# Patient Record
Sex: FEMALE | Race: OTHER | HISPANIC OR LATINO | Employment: UNEMPLOYED | URBAN - METROPOLITAN AREA
[De-identification: names, ages, dates, MRNs, and addresses within clinical notes are randomized per-mention and may not be internally consistent; named-entity substitution may affect disease eponyms.]

---

## 2019-02-04 ENCOUNTER — HOSPITAL ENCOUNTER (EMERGENCY)
Facility: HOSPITAL | Age: 19
Discharge: HOME | End: 2019-02-04
Attending: EMERGENCY MEDICINE
Payer: COMMERCIAL

## 2019-02-04 VITALS
RESPIRATION RATE: 17 BRPM | TEMPERATURE: 98.2 F | HEART RATE: 84 BPM | SYSTOLIC BLOOD PRESSURE: 127 MMHG | DIASTOLIC BLOOD PRESSURE: 74 MMHG | WEIGHT: 224 LBS

## 2019-02-04 DIAGNOSIS — R04.0 BLEEDING NOSE: Primary | ICD-10-CM

## 2019-02-04 PROCEDURE — 99283 EMERGENCY DEPT VISIT LOW MDM: CPT

## 2019-02-04 NOTE — DISCHARGE INSTRUCTIONS
Epistaxis   LO QUE USTED DEBE SABER:   La epistaxis es naif hemorragia o sangrado nasal  La hemorragia nasal ocurre cuando los vasos sanguíneos cerca de la superficie de la cavidad nasal sufren naif lesión o un daño  DESPUÉS DE SER DADO DE NATALIA:   Medicamentos:   · Atomizador nasal:  Un atomizador vasoconstrictor nasal es un medicamento que ayuda a que los vasos sanguíneos se estrechen  [place] limita el flujo de carlos y detiene la inflamación dentro de cummings nariz, facilitando cummings respiración  Es posible que le receten atomizadores nasales con agua salina u otros para proporcionar humedad a cummings nariz  · Antibióticos: Lorelei medicamento se administra para ayudar a tratar o prevenir naif infección causada por bacteria  · [place] amanda medicamentos romario se le haya indicado  Llame a cummings proveedor de jatinder si usted piensa que cummings medicamento no le está ayudando o si tiene efectos secundarios  Infórmele si es alérgico a cualquier medicamento  Mantenga naif lista vigente de los medicamentos, vitaminas, y hierbas que mala  [address] cantidades, la frecuencia con que los mala y por qué los mala  Traiga la lista o los envases de amanda medicamentos a las citas de seguimiento  Mantenga la lista consigo en brielle de naif emergencia  Bote las listas [place]  Lucretia naif lily de control con cummings médico de cabecera o cummings otorrinolaringólogo al cabo de 2 o 3 días o romario se lo indicaron:  Cualquier taponamiento en cummings nariz deberá ser extraído al cabo de 2 a 3 días  Escriba amanda preguntas para que recuerde hacerlas omid amanda citas  Primeros auxilios:   · Sentarse derecho e inclinarse hacia adelante:  [place] ayudará a prevenir que usted se trague la carlos  Escupa la carlos y saliva en naif vasija  · Aplique presión en cummings narizOlena Quails 2 dedos para apretar y cerrar cummings nariz por 10 minutos  [place] ayudará a detener el sangrado  Respire por la boca             · Aplique hielo:  Utilice naif compresa fría o hielo triturado en Catrina Gula, Linell Dancer con Jaz Juarez toalla húmeda y aplique eso sobre el armando de bella nariz  [place] ayudará a disminuir cualquier inflamación  · Taponamiento nasal:  Introduzca en bella nariz copos de algodón, pañuelos desechables, tampón, o gaza para detener el sangrado  Prevenir la epistaxis:   · Evite hurgarse la [place] y sonarse muy jackie:  Usted puede irritar o dañar bella nariz si se la hurga  Sonarse la [place] muy jackie puede producir que empiece el sangrado otra vez  · Evite los irritantes:  Las sustancias que podrían irritar bella nariz deberían evitarse  [place] incluye el fumar tabaco y los aerosoles químicos que contienen Ballesteros's     · Utilice un humidificador de juan fresco en bella hogar:  [place]lei le agregará humedad al aire y ayudará a que bella nariz permanezca humedecida  · Aplique naif cantidad pequeña de vaselina dentro de amanda fosas nasales:  Usted puede aplicar naif pequeña cantidad de vaselina si usted no tiene taponada la naríz  [place] le ayudará a prevenir que bella nariz se reseque o irrite  No se ponga nada más en bella nariz a menos que bella médico de cabecera se lo indique  Comuníquese con bella médico de cabecera o bella otorrinolaringólogo sí:   · Usted tiene fiebre y está vomitando  · Usted tiene dolor dentro y alrededor de bella nariz que se empeora a pesar de jenifer tomado analgésicos    · El taponamiento nasal está suelto  · Usted tiene preguntas o dudas sobre bella condición o cuidado  Busque atención inmediata sí:   · Bella taponamiento nasal está empapado de carlos  · Bella nariz todavía le sangra, aún después de aplicarle presión por 20 minutos  · A usted le sale naif secreción maloliente de bella nariz  · Usted se siente tan débil y [employer] se le dificulta quedarse de pie  · Usted tiene problemas para respirar o hablar  © 2014 [address] is for End User's use only and may not be sold, redistributed or otherwise used for commercial purposes   All illustrations and images included in CareNotes® are the copyrighted property of A D A M , Inc  or Fernando Scruggs  Esta información es sólo para uso en educación  [place] intención no es darle un consejo médico sobre enfermedades o tratamientos  Colsulte con cummings [employer] farmacéutico antes de seguir cualquier régimen médico para saber si es seguro y efectivo para usted

## 2019-02-04 NOTE — ED ATTENDING ATTESTATION
Shireen Dill DO, saw and evaluated the patient  I have discussed the patient with the resident/non-physician practitioner and agree with the resident's/non-physician practitioner's findings, Plan of Care, and MDM as documented in the resident's/non-physician practitioner's note, except where noted  All available labs and Radiology studies were reviewed  At this point I agree with the current assessment done in the Emergency Department  I have conducted an independent evaluation of this patient a history and physical is as follows:      Critical Care Time  Procedures   23-year-old female presents with an episode of bleeding from the left ear about a week ago no injury  She also noticed a nose bleed about that same time  Today she had an episode of vomiting and noticed a little bit of blood in the vomit  No fevers, headaches  No history of easy bruising or bleeding in the past   On exam she appears well in no distress  No evidence of bleeding from the ear  Little bit of dried blood in the right knee air  No posterior pharyngeal evidence of active nose bleed  Heart is regular without murmur  Lungs are clear  Abdomen is soft and nontender  Skin is warm and dry, normal color, no rash

## 2019-02-04 NOTE — ED PROVIDER NOTES
History  Chief Complaint   Patient presents with    Nose Bleed     pt reports nose bleed and blood in left ear for 1 week  pt denies injury or trauma  pt also reports vomiting blood yesterday morning independent of nose bleed  pt also reports intermittent dizziness and headache  78-year-old female that presents for blood in her left ear for 1 week along with a nose bleed that started yesterday morning  Patient did have 1 episode of vomiting and states that she threw up blood  She denies the feeling of anything running down the back of her throat  Patient denies any falls or trauma to the head or face  She does taking any medications and is an otherwise healthy young female  History provided by:  Patient and relative   used: No        None       History reviewed  No pertinent past medical history  Past Surgical History:   Procedure Laterality Date     SECTION         History reviewed  No pertinent family history  I have reviewed and agree with the history as documented  Social History   Substance Use Topics    Smoking status: Never Smoker    Smokeless tobacco: Never Used    Alcohol use No        Review of Systems   Constitutional: Negative for activity change  HENT: Positive for nosebleeds  Negative for postnasal drip, sinus pain, sinus pressure, sore throat and trouble swallowing  Respiratory: Negative for cough and chest tightness  Cardiovascular: Negative for chest pain  Gastrointestinal: Positive for vomiting  Negative for diarrhea and nausea  Genitourinary: Negative for flank pain  Musculoskeletal: Negative for back pain and neck pain  Skin: Negative for color change and wound  Allergic/Immunologic: Negative for immunocompromised state  Neurological: Positive for headaches  Negative for dizziness, facial asymmetry, light-headedness and numbness  Hematological: Does not bruise/bleed easily     Psychiatric/Behavioral: Negative for agitation, behavioral problems and confusion  Physical Exam  Physical Exam   Constitutional: She is oriented to person, place, and time  She appears well-developed and well-nourished  HENT:   Head: Normocephalic and atraumatic  Mouth/Throat: Oropharynx is clear and moist    TMs clear without evidence of blood or perforation bilaterally  Oropharynx and posterior oropharynx clear and dry with no evidence of blood  Right Nare 2 small abrasions 1 on the septum 1 on the lateral right knee nare, no signs of active bleeding  No evidence of posterior bleeding   Eyes: Pupils are equal, round, and reactive to light  EOM are normal    Neck: Normal range of motion  Neck supple  Cardiovascular: Normal rate, regular rhythm and normal heart sounds  Pulmonary/Chest: Effort normal and breath sounds normal    Abdominal: Soft  Bowel sounds are normal    Musculoskeletal: Normal range of motion  Neurological: She is alert and oriented to person, place, and time  Skin: Skin is warm and dry  Psychiatric: She has a normal mood and affect  Judgment and thought content normal    Nursing note and vitals reviewed        Vital Signs  ED Triage Vitals [02/04/19 1304]   Temperature Pulse Respirations Blood Pressure SpO2   98 2 °F (36 8 °C) 84 17 127/74 --      Temp Source Heart Rate Source Patient Position - Orthostatic VS BP Location FiO2 (%)   Oral Monitor Sitting Right arm --      Pain Score       --           Vitals:    02/04/19 1304   BP: 127/74   Pulse: 84   Patient Position - Orthostatic VS: Sitting       Visual Acuity      ED Medications  Medications - No data to display    Diagnostic Studies  Results Reviewed     None                 No orders to display              Procedures  Procedures       Phone Contacts  ED Phone Contact    ED Course                               MDM  Number of Diagnoses or Management Options  Bleeding nose: new and does not require workup  Risk of Complications, Morbidity, and/or Mortality  Presenting problems: low  Diagnostic procedures: low  Management options: low    Patient Progress  Patient progress: stable      Disposition  Final diagnoses:   None     ED Disposition     None      Follow-up Information    None         Patient's Medications    No medications on file     No discharge procedures on file      ED Provider  Electronically Signed by           Elton Ordonez PA-C  02/04/19 6638

## 2020-02-06 ENCOUNTER — OFFICE VISIT (OUTPATIENT)
Dept: FAMILY MEDICINE CLINIC | Facility: CLINIC | Age: 20
End: 2020-02-06

## 2020-02-06 VITALS
WEIGHT: 228 LBS | BODY MASS INDEX: 38.93 KG/M2 | OXYGEN SATURATION: 99 % | TEMPERATURE: 96.3 F | HEART RATE: 76 BPM | HEIGHT: 64 IN | RESPIRATION RATE: 16 BRPM | DIASTOLIC BLOOD PRESSURE: 68 MMHG | SYSTOLIC BLOOD PRESSURE: 112 MMHG

## 2020-02-06 DIAGNOSIS — Z00.00 ENCOUNTER FOR WELL ADULT EXAM WITHOUT ABNORMAL FINDINGS: ICD-10-CM

## 2020-02-06 DIAGNOSIS — E66.09 CLASS 2 OBESITY DUE TO EXCESS CALORIES WITHOUT SERIOUS COMORBIDITY WITH BODY MASS INDEX (BMI) OF 38.0 TO 38.9 IN ADULT: Primary | ICD-10-CM

## 2020-02-06 DIAGNOSIS — Z30.011 ENCOUNTER FOR INITIAL PRESCRIPTION OF CONTRACEPTIVE PILLS: ICD-10-CM

## 2020-02-06 DIAGNOSIS — Z11.4 ENCOUNTER FOR SCREENING FOR HIV: ICD-10-CM

## 2020-02-06 PROBLEM — E66.9 CLASS 2 OBESITY WITHOUT SERIOUS COMORBIDITY WITH BODY MASS INDEX (BMI) OF 38.0 TO 38.9 IN ADULT: Status: ACTIVE | Noted: 2020-02-06

## 2020-02-06 PROBLEM — E66.812 CLASS 2 OBESITY WITHOUT SERIOUS COMORBIDITY WITH BODY MASS INDEX (BMI) OF 38.0 TO 38.9 IN ADULT: Status: ACTIVE | Noted: 2020-02-06

## 2020-02-06 PROCEDURE — 3008F BODY MASS INDEX DOCD: CPT | Performed by: INTERNAL MEDICINE

## 2020-02-06 PROCEDURE — 99202 OFFICE O/P NEW SF 15 MIN: CPT | Performed by: INTERNAL MEDICINE

## 2020-02-06 PROCEDURE — 1036F TOBACCO NON-USER: CPT | Performed by: INTERNAL MEDICINE

## 2020-02-06 RX ORDER — NORETHINDRONE ACETATE AND ETHINYL ESTRADIOL 1MG-20(21)
1 KIT ORAL DAILY
Qty: 28 TABLET | Refills: 5 | Status: SHIPPED | OUTPATIENT
Start: 2020-02-06 | End: 2020-09-23

## 2020-02-06 NOTE — ASSESSMENT & PLAN NOTE
No historical red flags noted:  Family history or personal history of blood clots, history of spontaneous abortions, migraines with aura    Discussed OCP 90% effective in preventing pregnancy  Encouraged other methods including use of condoms to prevent unexpected outcomes  -prescription for Iman Castro with iron sent to pharmacy of choice

## 2020-02-06 NOTE — PATIENT INSTRUCTIONS
Píldoras anticonceptivas   LO QUE NECESITA SABER:   Las pildoras anticonceptivas también se conocen romario anticonceptivos orales o la píldora  [place] es un medicamento que ayuda a prevenir el embarazo  Las píldoras anticonceptivas trabajan previniendo la ovulación  La ovulación es cuando los ovarios producen y liberan un óvulo cada mes  Si lorelei óvulo es fertilizado por naif esperma, entonces ocurre el embarazo  Las píldoras anticonceptivas pueden también ayudar a prevenir el embarazo evitando que el esperma fertilice al óvulo  INSTRUCCIONES SOBRE EL NATALIA HOSPITALARIA:   Karma Duggan a amanda consultas de control con cummings médico según le indicaron  Anote amanda preguntas para que se acuerde de hacerlas omid amanda visitas  Ventajas de las píldoras anticonceptivas:  Cuando las píldoras anticonceptivas son usadas correctamente, las probabilidades de [place] son Alyne Dawood  Las píldoras anticonceptivas pueden ayudar a disminuir el sangrado y dolor omid cummings periodo menstrual  También pueden ayudar a prevenir el cáncer de útero y ovarios  Desventajas de las píldoras anticonceptivas:  Usted podría tener cambios de ánimo o sentimientos repentinos cuando está tomando las píldoras anticonceptivas  Podría tener náusea y deseo sexual disminuido  Podría tener más apetito y aumento rápido de [place]  También podría sangrar entre períodos [place], tener períodos menos frecuentes, sequedad en el área de cummings vagina y dolor en los senos  Las píldoras anticonceptivas no le protegerán de contraer infecciones por transmisión sexual  En raras ocasiones, algunas píldoras anticonceptivas podrían aumentar el riesgo de tener coágulos de [place]  Spearman podría poner en peligro cummings mackenzie  Si usted quiere embarazarse:  Si usted está planeando tener un bebé, pregunte a cummings médico cuándo puede suspender amanda píldoras anticonceptivas  Puede rubina algún tiempo antes de que usted comience a ovular   [place]ídale a cummings médico más información sobre Yenny Sorto después de jenifer tomado las píldoras anticonceptivas  Cuándo comenzar a rubina pastillas anticonceptivas después de tener un bebé:  Si usted no está amamantando (dando de lactar), entonces puede empezar a rubina las píldoras anticonceptivas 3 semanas después de eliezer a kyle  Usted podría rubina cierto tipo de píldoras anticonceptivas si está amamantando  Estas píldoras pueden empezar a tomarse de 6 semanas a 6 meses después de eliezer a kyle  Pídale a bella médico más información sobre cuándo empezar a tomarlas después de jenifer dado a kyle  Pregúntele a bella [place] vitaminas y minerales son adecuados para usted  · Usted ha olvidado rubina naif píldora anticonceptiva  · Usted presenta cambios en bella estado de ánimo, romario depresión, desde que comenzó a rubina la píldora  · Usted tiene náuseas o está vomitando  · Usted tiene dolor abdominal intenso  · Usted ha perdido un período y tiene preguntas o inquietudes acerca de Catherene Dirk  · Usted sigue sangrando 4 meses después de rubina las píldoras anticonceptivas correctamente  · Usted tiene preguntas o inquietudes acerca de bella condición o cuidado  Regrese a la alanis de emergencias si:   · Bella brazo o pierna se siente caliente, sensible y adolorida  Se podría hillary inflamado y martin  · Usted se siente mareada, le hace falta el aire y tiene dolor de [place]  · Usted expectora carlos  · Usted tiene alguno de los siguientes signos de derrame cerebral:      ¨ Adormecimiento o caída de un lado de bella breana     ¨ Debilidad en un brazo o naif pierna    ¨ Confusión o debilidad para hablar    ¨ Mareos o dolor de maría intenso, o pérdida de la visión  · Usted tiene Dru Souhermelindo, adormecimiento o hinchazón en amanda brazos o perri  © 2017 [address] Information is for End User's use only and may not be sold, redistributed or otherwise used for commercial purposes   All illustrations and images included in CareNotes® are the copyrighted property of A  D A MEHRAN , Jeferson Scruggs  Esta información es sólo para uso en educación  [place] intención no es darle un consejo médico sobre enfermedades o tratamientos  Colsulte con cummings Nneka Grapes farmacéutico antes de seguir cualquier régimen médico para saber si es seguro y efectivo para usted

## 2020-02-06 NOTE — PROGRESS NOTES
Assessment/Plan:    Encounter for screening for HIV  Discussed with patient that the USPSTF recommends that clinicians screen for HIV infection in adolescents and adults aged [place] to 72 years  Patient has agreed to undergo testing at this time  All questions were answered  Class 2 obesity without serious comorbidity with body mass index (BMI) of 38 0 to 38 9 in adult  Physical exam with stigmata insulin resistance and as such I have recommended that she complete A1C and Lipid panel  -RTC in 1 month for health maintenance       Encounter for initial prescription of contraceptive pills  No historical red flags noted:  Family history or personal history of blood clots, history of spontaneous abortions, migraines with aura    Discussed OCP 90% effective in preventing pregnancy  Encouraged other methods including use of condoms to prevent unexpected outcomes  -prescription for [address] with iron sent to pharmacy of choice  Diagnoses and all orders for this visit:    Class 2 obesity due to excess calories without serious comorbidity with body mass index (BMI) of 38 0 to 38 9 in adult  -     HEMOGLOBIN A1C W/ EAG ESTIMATION; Future  -     Lipid Panel with Direct LDL reflex; Future    Encounter for screening for HIV  -     HIV 1/2 AG-AB combo; Future    Encounter for well adult exam without abnormal findings    Encounter for initial prescription of contraceptive pills  -     norethindrone-ethinyl estradiol (JUNEL FE 1/20) 1-20 MG-MCG per tablet; Take 1 tablet by mouth daily          Subjective:      Patient ID: Ascencion Guadarrama is a 22 y o  female  19F with no PMH, currently not on any meds presents to establish care  OCP refill  She has been taking OCP during the past 3 months  She was prescribed this medication in her home country  and would like refills  Denies personal or FHx of blood clots, PE  Denies personal hx of spontaneous abortions  Denies migraines with aura   Denies any S/E from current prescription however unable to recall the name of this medicine  Currently she is sexually active with bf, does not plan on having kids until the end of this year  Denies vaginal pain/discharge  Denies high risk behavior  LMP: 1/24          Review of Systems   Constitutional: Negative for chills and fever  Respiratory: Negative for cough and shortness of breath  Cardiovascular: Negative for chest pain and palpitations  Endocrine: Negative for polyphagia and polyuria  Musculoskeletal: Negative for back pain  Neurological: Negative for light-headedness, numbness and headaches  Objective:      /68 (BP Location: Right arm, Patient Position: Sitting, Cuff Size: Large)   Pulse 76   Temp (!) 96 3 °F (35 7 °C)   Resp 16   Ht 5' 4 25" (1 632 m)   Wt 103 kg (228 lb)   SpO2 99%   BMI 38 83 kg/m²          Physical Exam   Constitutional: She appears well-developed and well-nourished  HENT:   Head: Normocephalic and atraumatic  Eyes: EOM are normal    Neck: Normal range of motion  Neck supple  Velvety patches noted to the neck   Cardiovascular: Normal rate and normal heart sounds  Pulmonary/Chest: Effort normal and breath sounds normal  No respiratory distress  Abdominal: Soft  Bowel sounds are normal  She exhibits no distension  Neurological: She is alert  Skin: Skin is warm and dry  Psychiatric: She has a normal mood and affect

## 2020-02-06 NOTE — ASSESSMENT & PLAN NOTE
Discussed with patient that the USPSTF recommends that clinicians screen for HIV infection in adolescents and adults aged 13 to 72 years  Patient has agreed to undergo testing at this time  All questions were answered

## 2020-02-06 NOTE — ASSESSMENT & PLAN NOTE
Physical exam with stigmata insulin resistance and as such I have recommended that she complete A1C and Lipid panel       -RTC in 1 month for health maintenance

## 2020-03-13 ENCOUNTER — TELEPHONE (OUTPATIENT)
Dept: FAMILY MEDICINE CLINIC | Facility: CLINIC | Age: 20
End: 2020-03-13

## 2020-03-13 NOTE — TELEPHONE ENCOUNTER
Nona Grossman MD  [address]             This patient missed her pap appointment with me  Please call and offer her a chance to reschedule with me  Thanks! [email or web address] will call back later on

## 2020-03-13 NOTE — TELEPHONE ENCOUNTER
----- Message from Kia Nolasco MD sent at 3/13/2020  8:38 AM EDT -----  This patient missed her pap appointment with me  Please call and offer her a chance to reschedule with me  Thanks!

## 2020-03-16 NOTE — TELEPHONE ENCOUNTER
Patient states she will call us back to reschedule pap, At this time she does not want to reschedule

## 2020-05-18 ENCOUNTER — TELEMEDICINE (OUTPATIENT)
Dept: FAMILY MEDICINE CLINIC | Facility: CLINIC | Age: 20
End: 2020-05-18

## 2020-05-18 DIAGNOSIS — N93.9 VAGINAL BLEEDING: Primary | ICD-10-CM

## 2020-05-18 PROBLEM — Z11.4 ENCOUNTER FOR SCREENING FOR HIV: Status: RESOLVED | Noted: 2020-02-06 | Resolved: 2020-05-18

## 2020-05-18 PROCEDURE — 99213 OFFICE O/P EST LOW 20 MIN: CPT | Performed by: INTERNAL MEDICINE

## 2020-06-12 ENCOUNTER — APPOINTMENT (EMERGENCY)
Dept: CT IMAGING | Facility: HOSPITAL | Age: 20
End: 2020-06-12
Payer: COMMERCIAL

## 2020-06-12 ENCOUNTER — HOSPITAL ENCOUNTER (EMERGENCY)
Facility: HOSPITAL | Age: 20
Discharge: HOME | End: 2020-06-12
Attending: EMERGENCY MEDICINE | Admitting: EMERGENCY MEDICINE
Payer: COMMERCIAL

## 2020-06-12 VITALS
HEART RATE: 85 BPM | RESPIRATION RATE: 18 BRPM | OXYGEN SATURATION: 98 % | TEMPERATURE: 98.5 F | BODY MASS INDEX: 38.71 KG/M2 | WEIGHT: 227.29 LBS | DIASTOLIC BLOOD PRESSURE: 65 MMHG | SYSTOLIC BLOOD PRESSURE: 109 MMHG

## 2020-06-12 DIAGNOSIS — N10 PYELONEPHRITIS, ACUTE: Primary | ICD-10-CM

## 2020-06-12 DIAGNOSIS — K52.9 GASTROENTERITIS: ICD-10-CM

## 2020-06-12 LAB
ALBUMIN SERPL BCP-MCNC: 4.9 G/DL (ref 3–5.2)
ALP SERPL-CCNC: 89 U/L (ref 43–122)
ALT SERPL W P-5'-P-CCNC: 25 U/L (ref 9–52)
ANION GAP SERPL CALCULATED.3IONS-SCNC: 12 MMOL/L (ref 5–14)
APTT PPP: 26 SECONDS (ref 23–37)
AST SERPL W P-5'-P-CCNC: 33 U/L (ref 14–36)
BACTERIA UR QL AUTO: ABNORMAL /HPF
BILIRUB SERPL-MCNC: 0.4 MG/DL
BILIRUB UR QL STRIP: NEGATIVE
BUN SERPL-MCNC: 15 MG/DL (ref 5–25)
CALCIUM SERPL-MCNC: 9.9 MG/DL (ref 8.4–10.2)
CHLORIDE SERPL-SCNC: 105 MMOL/L (ref 97–108)
CLARITY UR: CLEAR
CO2 SERPL-SCNC: 23 MMOL/L (ref 22–30)
COLOR UR: ABNORMAL
CREAT SERPL-MCNC: 0.76 MG/DL (ref 0.6–1.2)
ERYTHROCYTE [DISTWIDTH] IN BLOOD BY AUTOMATED COUNT: 15.5 %
EXT PREG TEST URINE: NORMAL
EXT. CONTROL ED NAV: NORMAL
GFR SERPL CREATININE-BSD FRML MDRD: 112 ML/MIN/1.73SQ M
GLUCOSE SERPL-MCNC: 134 MG/DL (ref 70–99)
GLUCOSE UR STRIP-MCNC: NEGATIVE MG/DL
HCT VFR BLD AUTO: 41.6 % (ref 36–46)
HGB BLD-MCNC: 13.4 G/DL (ref 12–16)
HGB UR QL STRIP.AUTO: 25
INR PPP: 0.98 (ref 0.84–1.19)
KETONES UR STRIP-MCNC: ABNORMAL MG/DL
LEUKOCYTE ESTERASE UR QL STRIP: NEGATIVE
LIPASE SERPL-CCNC: 28 U/L (ref 23–300)
LYMPHOCYTES # BLD AUTO: 1.6 THOUSAND/UL (ref 0.5–4)
LYMPHOCYTES # BLD AUTO: 7 % (ref 25–45)
MCH RBC QN AUTO: 24.6 PG (ref 26–34)
MCHC RBC AUTO-ENTMCNC: 32.2 G/DL (ref 31–36)
MCV RBC AUTO: 76 FL (ref 80–100)
MICROCYTES BLD QL AUTO: PRESENT
MONOCYTES # BLD AUTO: 2.28 THOUSAND/UL (ref 0.2–0.9)
MONOCYTES NFR BLD AUTO: 10 % (ref 1–10)
MUCOUS THREADS UR QL AUTO: ABNORMAL
NEUTS BAND NFR BLD MANUAL: 21 % (ref 0–8)
NEUTS SEG # BLD: 18.92 THOUSAND/UL (ref 1.8–7.8)
NEUTS SEG NFR BLD AUTO: 62 %
NITRITE UR QL STRIP: NEGATIVE
NON-SQ EPI CELLS URNS QL MICRO: ABNORMAL /HPF
PH UR STRIP.AUTO: 5 [PH]
PLATELET # BLD AUTO: 374 THOUSANDS/UL (ref 150–450)
PLATELET BLD QL SMEAR: ADEQUATE
PMV BLD AUTO: 7.5 FL (ref 8.9–12.7)
POTASSIUM SERPL-SCNC: 3.7 MMOL/L (ref 3.6–5)
PROT SERPL-MCNC: 9 G/DL (ref 5.9–8.4)
PROT UR STRIP-MCNC: ABNORMAL MG/DL
PROTHROMBIN TIME: 12.4 SECONDS (ref 11.6–14.5)
RBC # BLD AUTO: 5.45 MILLION/UL (ref 4–5.2)
RBC #/AREA URNS AUTO: ABNORMAL /HPF
RBC MORPH BLD: ABNORMAL
SODIUM SERPL-SCNC: 140 MMOL/L (ref 137–147)
SP GR UR STRIP.AUTO: 1.03 (ref 1–1.04)
TOTAL CELLS COUNTED SPEC: 100
URINE COMMENT: ABNORMAL
UROBILINOGEN UA: NEGATIVE MG/DL
WBC # BLD AUTO: 22.8 THOUSAND/UL (ref 4.5–11)
WBC #/AREA URNS AUTO: ABNORMAL /HPF

## 2020-06-12 PROCEDURE — 99284 EMERGENCY DEPT VISIT MOD MDM: CPT

## 2020-06-12 PROCEDURE — 80053 COMPREHEN METABOLIC PANEL: CPT | Performed by: PHYSICIAN ASSISTANT

## 2020-06-12 PROCEDURE — 81001 URINALYSIS AUTO W/SCOPE: CPT | Performed by: PHYSICIAN ASSISTANT

## 2020-06-12 PROCEDURE — 36415 COLL VENOUS BLD VENIPUNCTURE: CPT | Performed by: PHYSICIAN ASSISTANT

## 2020-06-12 PROCEDURE — 96361 HYDRATE IV INFUSION ADD-ON: CPT

## 2020-06-12 PROCEDURE — C9113 INJ PANTOPRAZOLE SODIUM, VIA: HCPCS | Performed by: PHYSICIAN ASSISTANT

## 2020-06-12 PROCEDURE — 85027 COMPLETE CBC AUTOMATED: CPT | Performed by: PHYSICIAN ASSISTANT

## 2020-06-12 PROCEDURE — 85610 PROTHROMBIN TIME: CPT | Performed by: PHYSICIAN ASSISTANT

## 2020-06-12 PROCEDURE — 81025 URINE PREGNANCY TEST: CPT | Performed by: PHYSICIAN ASSISTANT

## 2020-06-12 PROCEDURE — 96375 TX/PRO/DX INJ NEW DRUG ADDON: CPT

## 2020-06-12 PROCEDURE — 83690 ASSAY OF LIPASE: CPT | Performed by: PHYSICIAN ASSISTANT

## 2020-06-12 PROCEDURE — 85730 THROMBOPLASTIN TIME PARTIAL: CPT | Performed by: PHYSICIAN ASSISTANT

## 2020-06-12 PROCEDURE — 96374 THER/PROPH/DIAG INJ IV PUSH: CPT

## 2020-06-12 PROCEDURE — 99284 EMERGENCY DEPT VISIT MOD MDM: CPT | Performed by: PHYSICIAN ASSISTANT

## 2020-06-12 PROCEDURE — 85007 BL SMEAR W/DIFF WBC COUNT: CPT | Performed by: PHYSICIAN ASSISTANT

## 2020-06-12 PROCEDURE — 74177 CT ABD & PELVIS W/CONTRAST: CPT

## 2020-06-12 RX ORDER — DICYCLOMINE HCL 20 MG
20 TABLET ORAL 2 TIMES DAILY
Qty: 20 TABLET | Refills: 0 | Status: SHIPPED | OUTPATIENT
Start: 2020-06-12 | End: 2020-11-11 | Stop reason: ALTCHOICE

## 2020-06-12 RX ORDER — PANTOPRAZOLE SODIUM 40 MG/1
40 INJECTION, POWDER, FOR SOLUTION INTRAVENOUS ONCE
Status: COMPLETED | OUTPATIENT
Start: 2020-06-12 | End: 2020-06-12

## 2020-06-12 RX ORDER — CEPHALEXIN 500 MG/1
500 CAPSULE ORAL EVERY 8 HOURS SCHEDULED
Qty: 30 CAPSULE | Refills: 0 | Status: SHIPPED | OUTPATIENT
Start: 2020-06-12 | End: 2020-06-22

## 2020-06-12 RX ORDER — ONDANSETRON 2 MG/ML
4 INJECTION INTRAMUSCULAR; INTRAVENOUS ONCE
Status: COMPLETED | OUTPATIENT
Start: 2020-06-12 | End: 2020-06-12

## 2020-06-12 RX ORDER — ONDANSETRON 4 MG/1
4 TABLET, ORALLY DISINTEGRATING ORAL EVERY 6 HOURS PRN
Qty: 20 TABLET | Refills: 0 | Status: SHIPPED | OUTPATIENT
Start: 2020-06-12 | End: 2020-11-11 | Stop reason: ALTCHOICE

## 2020-06-12 RX ADMIN — PANTOPRAZOLE SODIUM 40 MG: 40 INJECTION, POWDER, FOR SOLUTION INTRAVENOUS at 18:57

## 2020-06-12 RX ADMIN — SODIUM CHLORIDE 1000 ML: 0.9 INJECTION, SOLUTION INTRAVENOUS at 18:57

## 2020-06-12 RX ADMIN — IOHEXOL 100 ML: 350 INJECTION, SOLUTION INTRAVENOUS at 19:53

## 2020-06-12 RX ADMIN — ONDANSETRON 4 MG: 2 INJECTION INTRAMUSCULAR; INTRAVENOUS at 18:56

## 2020-08-24 ENCOUNTER — TELEPHONE (OUTPATIENT)
Dept: OBGYN CLINIC | Facility: CLINIC | Age: 20
End: 2020-08-24

## 2020-08-25 ENCOUNTER — ULTRASOUND (OUTPATIENT)
Dept: OBGYN CLINIC | Facility: CLINIC | Age: 20
End: 2020-08-25

## 2020-08-25 VITALS
BODY MASS INDEX: 38.56 KG/M2 | HEART RATE: 80 BPM | SYSTOLIC BLOOD PRESSURE: 119 MMHG | TEMPERATURE: 98.2 F | DIASTOLIC BLOOD PRESSURE: 77 MMHG | WEIGHT: 226.4 LBS

## 2020-08-25 DIAGNOSIS — N91.2 AMENORRHEA: Primary | ICD-10-CM

## 2020-08-25 DIAGNOSIS — Z3A.08 8 WEEKS GESTATION OF PREGNANCY: ICD-10-CM

## 2020-08-25 PROCEDURE — 1036F TOBACCO NON-USER: CPT | Performed by: OBSTETRICS & GYNECOLOGY

## 2020-08-25 PROCEDURE — 76830 TRANSVAGINAL US NON-OB: CPT | Performed by: OBSTETRICS & GYNECOLOGY

## 2020-08-25 PROCEDURE — 99213 OFFICE O/P EST LOW 20 MIN: CPT | Performed by: OBSTETRICS & GYNECOLOGY

## 2020-08-25 RX ORDER — DIPHENHYDRAMINE HYDROCHLORIDE 25 MG/1
25 CAPSULE ORAL 3 TIMES DAILY
Qty: 90 TABLET | Refills: 3 | Status: SHIPPED | OUTPATIENT
Start: 2020-08-25 | End: 2021-03-31 | Stop reason: HOSPADM

## 2020-08-25 RX ORDER — PRENATAL VIT/IRON FUM/FOLIC AC 27 MG-1 MG
1 TABLET ORAL DAILY
Qty: 30 EACH | Refills: 8 | Status: SHIPPED | OUTPATIENT
Start: 2020-08-25

## 2020-08-25 NOTE — PROGRESS NOTES
S: 22 y o  [id number] who presents for viability scan with LMP of 2020  She is 8 weeks and 4 days by her LMP  She has regular menses  She reports nausea and vomiting multiple times daily  She denies cramping or vaginal bleeding  This is a planned and welcomed pregnancy  Her previous pregnancies were complicated by  sections  Her first delivery was after PPROM at approximately 7 months resulting in a  demise  Patient denies being on any special medications during her second pregnancy  She denies any significant past medical history  She denies any additional abdominal surgeries  History reviewed  No pertinent past medical history  OB History    Para Term  AB Living   2 2 1 1   1   SAB TAB Ectopic Multiple Live Births           2      # Outcome Date GA Lbr Tomasz/2nd Weight Sex Delivery Anes PTL Lv   2 Term      CS-Unspec  N ALKA   1       CS-Unspec   ND      Birth Comments: PPROM     O:  Vitals:    20 1128   BP: 119/77   Pulse: 80   Temp: 98 2 °F (36 8 °C)   Weight: 103 kg (226 lb 6 4 oz)       TVUS: viable, alvarado IUP at 8 weeks 0 days with CRL 1 55cm  FHT 174bpm             A/P:    Problem List Items Addressed This Visit        Unprioritized    8 weeks gestation of pregnancy - Primary     - Prenatal vitamin prescribed  - Vitamin B6 & Unison prescribed for nausea and vomiting   - Viable IUP on TVUS  Final dating by LMP  JUSTINE 2021    - RTO in 1 week for OB H&P and intake         Relevant Medications    Prenatal Vit-Fe Fumarate-FA (Prenatal/Folic Acid) TABS    doxylamine (UNISON) 25 MG tablet    Pyridoxine HCl (vitamin B-6) 25 MG tablet        Ansley Abebe MD  OB/GYN  2020  2:20 PM

## 2020-08-25 NOTE — PATIENT INSTRUCTIONS
[place] y vómito en el embarazo   CUIDADO AMBULATORIO:   La náusea y el vómito en el embarazo  pueden suceder a cualquier hora del día  Estos síntomas usualmente comienzan antes de la semana 9 del embarazo y terminan para la semana 14 (shilpa trimestre)  Algunas mujeres pueden tener náusea o vómito por un tiempo prolongado  Estos síntomas pueden afectar a algunas mujeres omid el embarazo  La náusea y el vómito no dañan a cummings bebé  Estos síntomas pueden dificultarle shirley actividades diarias  Busque atención médica de inmediato si:   · Usted presenta signos de deshidratación  Por ejemplo, orina de color amarillo oscuro, boca y labios resecos, piel reseca, latido cardíaco acelerado y orinar menos de lo normal     · Usted tiene dolor abdominal intenso  · Usted se siente demasiado débil o mareado romario para ponerse de pie  · Usted nota carlos en cummings vómito o en shirley deposiciones  Pregúntele a cummings Lucretia Álvarez vitaminas y minerales son adecuados para usted  · Usted vomita más de 4 veces en 1 día  · Usted no ha podido retener líquidos en el estómago por más de 1 día  · Usted pierde más de 2 libras  · Usted tiene fiebre  · Shirley náuseas y vómito continúan por más de 14 semanas  · Usted tiene preguntas o inquietudes acerca de cummings condición o cuidado  El tratamiento  para la náusea y el vómito en el embarazo generalmente no es necesario  Usted puede [employer] alimentos que come y en shirley actividades para ayudar a controlar shirley síntomas  Es posible que usted necesite probar varias cosas para determinar qué funciona mejor para usted  Hable con cummings médico si shirley síntomas no mejoran con los cambios que se recomiendan a continuación  Es posible que usted necesite vitamina B6 y medicamento si estos cambios no ayudan o si shirley síntomas se vuelven graves     Cambios de nutrición que usted puede realizar para controlar la náusea y el vómito:   · Coma porciones pequeñas omid el día en vez de 3 comidas con porciones grandes  Es más probable que usted tenga náusea y vómito cuando cummings estómago está vacío  Consuma alimentos bajos en grasa y ricos en proteínas  Ejemplos son Mauricio Risser, frijoles, pavo y anila sin kimberlyn Hernandez, romario galletas saladas, cereal seco o un sandwich chico antes de [place]  · Coma galletas saladas o pan carlos antes de levantarse de cummings cama por la mañana  Levántese de la cama lentamente  Los movimientos repentinos podrían provocarle mareos y [place]  · Consuma alimentos blandos cuando se sienta con náuseas  Ejemplos de alimentos blandos son el pan carlos, cereal seco, pasta sin Alexia Noemi y pan  Otros alimentos blandos incluyen a las [employer], plátanos, gelatina y pretzels  Evite los alimentos condimentados, grasosos y fritos  Evite otros alimentos que le provoquen náuseas  · [place] líquidos que contengan gengibre  [place] refresco de gengibre hecho con gengibre real o té de gengibre hecho con gengibre fresco rallado  Las [employer] o dulces de gengibre también podrían ayudar a aliviar la náusea y el vómito  · [place] líquidos entre alimentos en vez de tomarlos con los alimentos  [place] al menos 30 minutos después de comer para rubina líquidos  [place] cantidades pequeñas de líquidos con frecuencia omid el día para evitar la deshidratación  Consulte cuál es la cantidad de líquido que usted debería consumir al día  Otros cambios que usted puede realizar para controlar la náusea y el vómito:   · Evite los olores que la [place]  Los olores zane podrían provocar que [employer] náuseas y el vómito, o podrían empeorarlo  Camine un poco, prenda un ventilador o trate de dormir con la ventana abierta para respirar aire fresco  Cuando esté cocinando, mame las ventanas para eliminar el olor que podría provocarle náuseas  · No se cepille amanda dientes inmediatamente después de comer  si eso le provoca náuseas  · Descanse cuando lo necesite    Comience naif actividad lentamente y vuelva a bella rutina normal conforme se empiece a sentir mejor  · Hable con bella médico acerca de las vitaminas prenatales  Las vitaminas prenatales pueden provocar náuseas a algunas mujeres  Trate de tomárselas por la noche o con un bocadillo  Si iveth cambio no le [place], bella médico podría recomendarle un tipo de vitamina diferente  · No use ningún medicamento, vitamina o suplemento para controlar shirley síntomas sin antes consultarlo con bella médico   Varios medicamentos pueden dañar a bella bebé que no ha nacido  · El ejercicio de ligero a moderado  podría ayudar a aliviar shirley síntomas  También podría ayudarla a dormir mejor por la noche  Pregunte a bella médico acerca del mejor plan de ejercicio para usted  Acuda a shirley consultas de control con bella médico según le indicaron  Anote shirley preguntas para que se acuerde de hacerlas omid shirley visitas  © 2017 [address]  Information is for End User's use only and may not be sold, redistributed or otherwise used for commercial purposes  All illustrations and images included in CareNotes® are the copyrighted property of [employer]  or Fernando Scruggs  Esta información es sólo para uso en educación  Bella intención no es darle un consejo médico sobre enfermedades o tratamientos  Colsulte con bella [place] farmacéutico antes de seguir cualquier régimen médico para saber si es seguro y efectivo para usted  Embarazo de la semana 7 a la 10   LO QUE NECESITA SABER:   La hormonas del embarazo podrían provocar que bella cuerpo pase por varios cambios omid esta etapa del embarazo  Es posible que usted se sienta más cansado de lo normal y que tenga cambios de humor, náuseas y vómitos, y aubrey de [place]  Shirley senos podrían sentirse sensibles e inflamados y usted podría orinar con más frecuencia    INSTRUCCIONES SOBRE EL NATALIA HOSPITALARIA:   Busque atención médica de inmediato si:   · Usted tiene dolor o cólicos en el abdomen o la parte baja de la espalda  · Usted tiene sangrado vaginal abundante o coágulos  · Le sale un material que parece tejido o coágulos grandes  Recolecte el material y tráigalo con usted  Pregúntele a cummings Maxcine Shonna vitaminas y minerales son adecuados para usted  · Usted tiene un sangrado leve  · Usted tiene escalofríos o fiebre  · Usted tiene comezón, ardor o dolor vaginal      · Usted tiene naif secreción vaginal amarillenta, verdosa, ochoa o de [employer]  · Usted tiene dolor o ardor al Racquel James, orina menos de lo habitual o tiene [place] rosada o sanguinolenta  · Usted tiene preguntas o inquietudes acerca de cummings condición o cuidado  Cómo cuidarse en esta etapa de cummings embarazo:   · Controle la náusea y el vómito  Evite los alimentos grasosos y picantes  Coma comidas pequeñas omid el día en vez de porciones grandes  El jengibre puede ayudar a [employer]  Consulte con cummings médico acerca de otras formas para disminuir las náuseas y el vómito  · Consuma alimentos saludables y variados  Alimentos saludables incluyen frutas, verduras, panes de florinda integral, alimentos lácteos bajos en grasa, frijoles, jorge luis magras y pescado  [address] líquidos romario se le haya indicado  Pregunte cuánto líquido debe rubina cada día y cuáles líquidos son los más adecuados para usted  Limite el consumo de cafeína a menos de [address]  Limite el consumo de pescado a 2 porciones cada semana  Escoja pescado con concentraciones bajas de kelly romario atún al natural enlatado, camarón, salmón, bacalao o tilapia  No  coma pescado con concentraciones altas de kelly romario pez franca, caballa gigante, pargo rayado y tiburón  · [address] [place]  Cumm[place] necesidad de ciertas vitaminas y [address] [facility], romario el ácido fólico, aumenta omid el [facility]  Las vitaminas prenatales proporcionan algunas de las vitaminas y minerales adicionales que usted necesita   [place] vitaminas prenatales también podrían ayudar a disminuir el riesgo de ciertos defectos de nacimiento  · Pregunte cuánto peso usted debería aumentar cada mes  Demasiado aumento de peso o muy poco puede ser poco saludable para usted y cummings bebé  · No fume  Si usted fuma, nunca es demasiado tarde para dejar de hacerlo  Fumar aumenta el riesgo de aborto espontáneo y otros problemas de jatinder omid cummings [place]  Fumar puede causar que cummings bebé nazca antes de tiempo o que pese menos al nacer  Solicite información a cummings médico si usted necesita ayuda para dejar de fumar  · No consuma alcohol  El alcohol pasa de cummings cuerpo al bebé a través de la placenta  Puede afectar el desarrollo del cerebro de cummings bebé y provocar el síndrome de alcoholismo fetal (SAF)  FAS es un marcella de condiciones que causan [address], de comportamiento y de crecimiento  · Consulte con cummings médico antes de rubina cualquier medicamento  Muchos medicamentos pueden perjudicar a cummings bebé si usted los mala [address]  No tome ningún medicamento, vitaminas, hierbas o suplementos sin michaela consultar con cummings Rubén Sensor  use drogas ilegales o de la aranda (romario marihuana o cocaína) mientras está embarazada  Consejos de seguridad omid el embarazo:   · Evite jacuzzis y saunas  No use un jacuzzi o un sauna mientras usted está embarazada, especialmente omid el primer trimestre  [facility] y los saunas aumentan la temperatura de cummings bebé y el riesgo de defectos de nacimiento  · Evite la toxoplasmosis  Protection es naif infección causada por comer carne cruda o estar cerca del excremento de un dilcia infectado  Protection puede causar malformaciones congénitas, aborto espontáneo y Cullen Schein  Lávese las beth después de tocar carne cruda  Asegúrese de que la carne esté zayda cocida antes de comerla  Evite los huevos crudos y la Cleave Bone   Use guantes o pida que alguien la ayude a limpiar la caja de arena del [place] mientras usted [employer]  Cambios que están ocurriendo con cummings bebé:  Para las 10 semanas, cummings bebé medirá alrededor de 2 ½ pulgadas desde la domi hasta la rabadilla (parte inferior o cóccix del bebé)  Cummings bebé pesa alrededor de ½ onza  Los [employer] del cuerpo, romario el cerebro, corazón y pulmones se están formando  Las características faciales de cummings bebé también están comenzando a formarse  Lo que necesita saber acerca del cuidado prenatal:  El cuidado prenatal se trata de naif serie de visitas con cummings médico a lo lien del embarazo  Omid las primeras 28 semanas de cummings Jai, usted tendrá citas mensuales con cummings médico  El cuidado prenatal puede ayudar a evitar problemas omid el [facility] y Jacqui  Cummings médico le hará preguntas acerca de cummings jatinder y de cualquier embarazo previo que usted haya tenido  Él también le preguntará acerca de cualquier medicamento que esté tomando  Es posible que también necesite alguno de los siguientes tratamientos:  · Naif prueba de [place]  se realiza para revisar si cummings amy uterino tiene células anormales  El amy uterino es la apertura angosta que está en la parte inferior de [place]  El amy uterino se junta con la parte superior de la vagina  · Un examen pélvico  le permite a cummings médico observar cummings amy uterino (la parte inferior de cummings [place])  Cummings médico utiliza un espéculo para abrir cummings vagina suavemente  Él examinará el tamaño y forma de cummings útero  · Los análisis de carlos:  podrían realizarse para revisar signos de anemia o el tipo de Jaimee  Cmumings médico también podría ordenarle otros exámenes de carlos para revisar si usted es inmune a ciertas enfermedades romario la Hepatitis B  También podría recomendarle un examen del VIH  · Análisis de orina  también podrían realizarse para revisar si hay signos de infección  · Cummings presión arterial y peso  será revisado    © 2017 [address] Osorio Information is for End User's use only and may not be sold, redistributed or otherwise used for commercial purposes  All illustrations and images included in CareNotes® are the copyrighted property of A D A M , Inc  or Fernando Scruggs  Esta información es sólo para uso en educación  [place] intención no es darle un consejo médico sobre enfermedades o tratamientos  Colsulte con cummings Jc Lauth farmacéutico antes de seguir cualquier régimen médico para saber si es seguro y efectivo para usted

## 2020-08-25 NOTE — ASSESSMENT & PLAN NOTE
- Prenatal vitamin prescribed  - Vitamin B6 & Unison prescribed for nausea and vomiting   - Viable IUP on TVUS  Final dating by LMP   JUSTINE 4/2/2021    - RTO in 1 week for OB H&P and intake

## 2020-09-08 ENCOUNTER — TELEPHONE (OUTPATIENT)
Dept: OBGYN CLINIC | Facility: CLINIC | Age: 20
End: 2020-09-08

## 2020-09-09 ENCOUNTER — TELEMEDICINE (OUTPATIENT)
Dept: OBGYN CLINIC | Facility: CLINIC | Age: 20
End: 2020-09-09

## 2020-09-09 DIAGNOSIS — Z34.91 PRENATAL CARE IN FIRST TRIMESTER: Primary | ICD-10-CM

## 2020-09-09 PROCEDURE — 1036F TOBACCO NON-USER: CPT

## 2020-09-09 PROCEDURE — 99211 OFF/OP EST MAY X REQ PHY/QHP: CPT

## 2020-09-09 NOTE — PROGRESS NOTES
Virtual Brief Visit    Assessment/Plan:    Problem List Items Addressed This Visit     None      Visit Diagnoses     Prenatal care in first trimester    -  Primary    Relevant Orders    Prenatal Panel    Hemoglobin Electrophoresis    Hepatitis C antibody    Ambulatory Referral to Maternal Fetal Medicine    Ambulatory referral to social work care management program                Reason for visit is   Chief Complaint   Patient presents with    Initial Prenatal Visit        Encounter provider OBGYN NURSE    Provider located at [address] [address]  [phone]    Recent Visits  Date Type Provider Dept   09/08/20 Telephone [address]   Showing recent visits within past 7 days and meeting all other requirements     Today's Visits  Date Type Provider Dept   09/09/20 Telemedicine OBGYN [address] today's visits and meeting all other requirements     Future Appointments  No visits were found meeting these conditions  Showing future appointments within next 150 days and meeting all other requirements        After connecting through telephone, the patient was identified by name and date of birth  Katie Red was informed that this is a telemedicine visit and that the visit is being conducted through telephone  My office door was closed  No one else was in the room  She acknowledged consent and understanding of privacy and security of the platform  The patient has agreed to participate and understands she can discontinue the visit at any time  Patient is aware this is a billable service  OBSTETRIC INTAKE VISIT    Katie Red presents today for initial OB visit and intake at Unknown  History obtained from patient and she reports it as follows:    History reviewed  No pertinent past medical history    Past Surgical History:   Procedure Laterality Date     SECTION      2     OB History    Para Term  AB Living   3 2 1 1   1   SAB TAB Ectopic Multiple Live Births           2      # Outcome Date GA Lbr Tomasz/2nd Weight Sex Delivery Anes PTL Lv   3 Current            2 Term  40w0d  2948 g (6 lb 8 oz) F CS-Unspec  N ALKA   1   32w0d    CS-Unspec   ND      Birth Comments: PPROM     Social History     Tobacco Use    Smoking status: Never Smoker    Smokeless tobacco: Never Used   Substance Use Topics    Alcohol use: No    Drug use: No       Current Outpatient Medications   Medication Instructions    dicyclomine (BENTYL) 20 mg, Oral, 2 times daily    doxylamine (UNISON) 12 5 mg, Oral, Daily at bedtime PRN    norethindrone-ethinyl estradiol (JUNEL FE ) 1-20 MG-MCG per tablet 1 tablet, Oral, Daily    ondansetron (ZOFRAN-ODT) 4 mg, Oral, Every 6 hours PRN    Prenatal Vit-Fe Fumarate-FA (Prenatal/Folic Acid) TABS 1 tablet, Oral, Daily    vitamin B-6 25 mg, Oral, 3 times daily       No Known Allergies    Dating of Pregnancy: 2021    Review of Systems:   Denies vaginal bleeding or leaking of fluid  Denies uterine contractions or abdominal pain  LMP 2020        Plan:  1  Prenatal care in first trimester  - Prenatal Panel  - Hemoglobin Electrophoresis  - Hepatitis C antibody  - Ambulatory Referral to Maternal Fetal Medicine for sequential screen  - Ambulatory referral to social work care management program  2  Next ultrasound scheduled for 2021   3  Return in 1 week for next prenatal H/P visit  4  Full OB physical and pelvic exam to be done at next visit with pap/GC/CT  5  Referral placed for  consultation    6  Reviewed the following educational topics with patient:   -routine prenatal visit/ultrasound/labwork schedule   -nutritional demands of pregnancy, healthy dietary habits   -listeria, toxoplasmosis, seafood precautions   -weight gain expectations (based on pre-pregnant BMI)   -exercise, rest, and sexual activity during pregnancy   -abstinence from alcohol, tobacco, and illegal drugs   -common discomforts of pregnancy and appropriate management   -OTC medications safe to use in pregnancy   -genetic screening options   -WIC referral   -symptoms to report to [facility] provider    -signs of PTL    -vaginal bleeding/leaking of fluid    -severe n/v-unable to tolerate ANY food/fluids for more than 24 hours  Explained to patient how to contact OB provider after office hours  I spent 30 minutes directly with the patient during this visit   Comparable to office visit [address], RN  9/9/2020    VIRTUAL VISIT DISCLAIMER    Ama Hurtado acknowledges that she has consented to an online visit or consultation  She understands that the online visit is based solely on information provided by her, and that, in the absence of a face-to-face physical evaluation by the physician, the diagnosis she receives is both limited and provisional in terms of accuracy and completeness  This is not intended to replace a full medical face-to-face evaluation by the physician  Gus Strickland understands and accepts these terms

## 2020-09-09 NOTE — PATIENT INSTRUCTIONS
El Primer Trimestre  (hasta 14 semanas)   LI BEBÉ   · Il bebé comienza a desarrollarse cuando el óvulo y un espermatozoide se unen  · Li bebé crece dentro de li útero (también llamado tu vientre)  Flota dentro de naif bolsa de agua - llamado el saco amniótico  El bebé está conectado a ti por un cordón umbilical que va desde el vientre del bebé a la placenta  La placenta se une a li útero y la placenta es donde [place], oxígeno y alimentos cruzan encima de usted a li bebé  · Cosas romario drogas, alcohol y tabaco pueden también cruzar de usted a li bebé a través de la placenta  Es importante evitar estas cosas, romario pueden ser perjudiciales para cómo tu bebé se desarrolla y crece  · Al final del primer mes, late el corazón de li bebé  El bebé es aproximadamente del tamaño de un trozo de arroz  · Al final del shilpa mes, todos los órganos del bebé (corazón, pulmones, cerebro, cráneo) maradiaga formado completamente  Ahora sólo tienen que seguir madurando y creciendo  El bebé es aproximadamente del tamaño de [place]  · Al final del tercer mes, li bebé es de aproximadamente 4 pulgadas de lien! TU CUERPO   · Li período se detiene - esto puede ser la primera señal que tienes que está [place]   · Tus pechos pueden volverse dolorido y [place]  · Se puede sentir muy cansada  · Usted puede tener un malestar estomacal con náuseas o vómitos que pueden ocurrir en cualquier momento del día - o a veces omid todo el día  · Usted puede sentirse malhumorado y gruñón  También puede sentir miedo o pena  Sewaren es normal y natural    · Usted puede perder o ganar peso  Sewaren está zayda, siempre y cuando usted no está perdiendo o ganando [employer]          [place] Trimestre  (14-28 semanas)   LI BEBÉ   · 4to mes   · li bebé tiene pestañas y carlos   · li bebé patea, se mueve y se traga   · li bebé es 6 a 7 pulgadas de lien   · 5to mes   · li bebé tiene uñas   · li bebé Kirby Peal a dormir y despertar ciclos   · bebé Leydi Exon a ser Jatinder Menjivar (aunque no siempre sientes lo) girando de lado a lado y [place]   · li bebé es de 8 a 12 pulgadas de lien y pesa en cualquier lugar de ½ a 1 sridhar   · 6to mes   · los ojos de li bebé están radha completamente formados y pronto comenzarán a abrir y cerrar   · la piel del bebé es Geraldine Lien y [place] y [place] con pelo kim y [place] llamado "lanugo"   · bebé sigue siendo muy activo y que debe estar sintiendo muy zayda y muy a menudo   · li bebé es de 11 a 14 pulgadas de lien     TU CUERPO   · Nauseas del embarazo usualmente comienza a desaparecer y las mujeres generalmente comienzan a subir de peso más rápidamente  · Puede comenzar a tener estrías en el vientre o senos que pueden ser "picores"  Frotarlas loción sobre ellos para ayudar a aliviar la [place]  · Tu flujo vaginal puede llegar a ser de color blanquecino  · Usted puede ser estreñido  Intente aumentar la Joaquin Schwab, o puede rubina naif pastilla de suavizante de heces (romario Colace) para aliviar el malestar  · Puede comenzar a tener ardor de estómago  Medicamentos romario Tums o Maalox pueden ayudar a aliviar esto  Trate de permanecer en naif posición sentada por lo menos naif hora después de las comidas para disminuir la acidez estomacal    · Deberías probar a 8 horas de sueño en la noche, además de naif siesta omid el día si es posible  · No deberías sentarte omid más de dos horas sin rubina un descanso para estirar las piernas  El Tercer Trimestre  (28-42 semanas)   LI BEBÉ   · li bebé chupa li dedo ahora! · li bebé puede oír voces y responder al tacto    habla con [place]!!   · el cerebro de li bebé crece y se desarrolla más en los últimos 2 meses de embarazo   · maría y [place] del bebé son Lollie Rinks y flexibles para que quepan por el canal del parto   · los movimientos del bebé cambian hacia el final del embarazo porque hay menos espacio para patear y estiramientos en tu vientre   · los pulmones del bebé no están completamente desarrollados y totalmente preparados para respirar por amanda propios hasta el último 3-4 semanas antes de cummings fecha de vencimiento     TU CUERPO   · cummings vientre está creciendo mucho ahora   · será más difícil dormir zayda de noche o ser tan activos romario eres generalmente   · puede sudar más de lo habitual   · serás más desequilibrado    tenga cuidado de no caer! · Usted puede desarrollar hemorroides (qué pueden ser dolorosas y hacen difícil sentarse)   · los dos últimos meses del embarazo puede ser muy incómodos con aubrey de [place], aubrey de [place] y ardor de estómago   · Puedes empezar a tener contracciones    mientras son irregulares y Calli de 5 por hora, esto es naif parte normal de cummings cuerpo a punto de tener un bebé   · el amy uterino puede empezar a dilatar y abrir [facility]    para estar listo para el parto   · Usted puede encontrarse que necesitan hacer orinar muy a menudo    porque el bebé está presionando mucho la vejiga   · Usted puede quedarse corta de respiracion más rápido de lo karen          Mi bebé está desarrollando normalmente? ESTUDIOS GENETICO      Naif de las preocupaciones que muchas mujeres tienen acerca de cummings embarazo es si cummings bebé se está desarrollando normalmente  Existen varias pruebas diferentes que podemos usar para tratar de ayudar y determinar si los bebés están desarrollando normalmente o no  Algunas mujeres tienen un riesgo más alto para que cummings bebé se desarrollan anormalmente (a veces llamado un defecto de nacimiento), saskia le puede pasar a CUALQUIER  Es por eso que ofrecemos estas pruebas a TODAS las mujeres omid amanda [place]  Ninguno de [place] exámenes son requerido  Es cummings decisión cuáles puede elegir o NO eligir ninguno omid cummings embarazo  Algunas de estas pruebas sólo están disponibles en un determinado momento omid cummings embarazo, así que es importante rubian decisiones sobre las pruebas qué desea temprano en el [facility]   Aquí hay información sobre estas pruebas diferentes para ayudarle a rubina decisiones sobre si alguna de estas pruebas son [place] para usted  * ANATOMIA ULTRASONIDO : esta ultrasonido se realiza entre 18 a 25 semanas y Sonya de cerca a todas partes de cummings bebé y piezas para buscar signos de cualquier desarrollo anormal; el ultrasonido no es perfecto y NO PUEDE detectar TODOS los tipos de defectos de nacimiento (especialmente síndrome de Down) - saskia es naif prueba [place]  * PROYECCIÓN SECUENCIAL: esta es realmente naif combinación de pruebas que incluyen un ultrasonido que mide la parte posterior del amy de cummings bebé [employer] semanas 11-13 y análisis de carlos de usted en que naif y otra vez entre las semanas 15-22; Iveth examen puede ayudar a decirnos el riesgo para cummings bebé se ve afectada por ciertas anomalías cromosómicas o defectos congénitos  * CUÁDRUPLE PANTALLA: iveth examen se hace con análisis de carlos sólo de usted entre 15-22 semanas de [facility]; puede ayudar a decirnos el riesgo para cummings bebé se ve afectada por ciertas anomalías cromosómicas o defectos congénitos  * CELULAR-JIM DE ADN : esta prueba se realiza con análisis de carlos sólo de usted cualquier momento después de 10 semanas de embarazo; es muy preciso al decirnos si cummings bebé tiene naif nahum probabilidad de síndrome de Down u otras anomalías del cromosoma [place] es generalmente reservado para las mujeres que tienen un factor de alto riesgo para un bebé con naif anormalidad del cromosoma  * ERWIN MATERNAL senior living-FETO PROTEINA PANTALLA: iveth examen se hace con el solo análisis de Jaimee de entre 15-22 semanas de Jai, nos ayuda a Tura Aly idea si cummings fernando esta desarrollando un defecto de nacimiento romario wm bifida       * AMNIOCENTESIS : Esta prueba implica el uso de naif aguja muy destinee que se inserta en el útero para sacar un poco de líquido alrededor de cummings bebé para la prueba; se puede realizar cualquier momento después de 16 semanas de embarazo; [facility] nos dice con certeza si cummings bebé tiene defectos de nacimiento particular (sobre todo anormalidades del cromosoma); hay un pequeño riesgo de aborto espontáneo que [place] cuando matt tiene esta prueba realizada; esta prueba es generalmente reservada para las mujeres que tienen un factor de alto riesgo para un bebé con naif anormalidad  Las mujeres que tienen un riesgo más alto para los bebés que se desarrollan anormalmente (a veces llamado un defecto de nacimiento) incluir a las mujeres con los siguientes:   · [address] años de edad o mayores   · Obesidad en el momento de la sadaf   · Diabetes en el momento de la sadaf   · Un reuben anterior con un defecto de nacimiento   · Tomando medicamentos que pueden causar un defecto de nacimiento     Aquí están algunas preguntas importantes para hacerse al decidir que (eventualmente) de pruebas quiero tener  · ¿Quiero saber antes de nacer si mi bebé tiene un defecto de nacimiento? · ¿Qué haré con esta información si el resultado muestra naif gran oportunidad para un defecto de nacimiento? · ¿Cómo aprender acerca de un defecto de nacimiento me ayudaría a rubina decisiones sobre mi embarazo? · ¿Estas pruebas me ayudará sentir más tranquilos o más ansiedad y miedo?              [address] está naif lista de [place] que son seguros para rubina omid el embarazo sin tener que discutir con el médico o enfermera:     ·        Tylenol/Acetaminophen (dolor de maría [place] Jared)   ·        Benadryl/Diphenhydramine (alergias, congestión nasal, insomnio, picazon)   ·        Claritin/Loratidine (alergias, congestión nasal)   ·        Zyrtec/Cetirizine (alergias, congestión nasal)   ·        Robitussin/Guaifenesin regular (tos)   ·        Sudafed/Pseudoephedrine (congestión nasal)   ·        Colace/Docusate sodium (estreñimiento)   ·        Maalox/Magnesium hydroxide (acidez, indigestión)   ·        Zantac/Ranitidine (acidez, indigestión)   · Pepcid/Famotidine (acidez, indigestión)   ·        Tums/Calcium carbonate Tala Natter, náuseas)   ·        Kaopectate/Bismuth subsalicylate (diarrea)   ·        Immodium/Loperamide (diarrea)   ·        Vitamina B6/Pyrodoxine (náuseas)   ·        Doxylamine/Unisom (náuseas, insomnio)     Debe discutir con nuestra enfermera practicante o quang de nuestros médicos antes de rubina cualquier otro medicamento  NUTRICION EN EL EMBARAZO  [place] nutrición es naif parte importante de tener un embarazo saludable y un bebé armando  Usted debe seguir naif dieta saludable que incluyen los siguientes:   · Verduras (que son oscuros josh y frondoso): al menos 2 raciones cada día   · Proteína (carne, huevos, frijoles, nueces, mantequilla de Byrnes): 3-4 raciones cada día   · Granos panes/todo (pan, pasta, arroz, tortillas, farzana): 3 porciones cada día   · Productos lácteos ([place], yogur, [place]-barre): 3-4 raciones cada día   · De agua: 6-8 vasos por día   · Calorías: aproximadamente 2000 a 2200 calorías por día    AUMENTO DE PESO   Aumento de peso recomendado para usted omid cummings embarazo se basa en el índice de masa corporal ([address]) en el momento en que usted Thiago Graven  Aumento de peso recomendado de [address] antes del embarazo   Bajo peso [facility] inferior a 18,5) 28 a 40 libras   Normal (IMC 18 5-24 9) de peso 25 a 35 libras   Sobrepeso (IMC 25-29 9) 15 a 25 libras   Shin ([address] de 30 o mayor) 11 a [address] LOS ALIMENTOS   Es muy importante comer sólo alimentos preparados en forma ahn omid el embarazo romario usted y cummings bebé tienen un riesgo más alto de lo habitual para ser afectados por enfermedades transmitidas por los alimentos   Siga estos pasos para asegurarse de que usted y cummings bebé estén a hong de las enfermedades transmitidas por los alimentos omid el embarazo:   · jeanie zayda las beth con agua tibia y jabón antes y después de manipular cualquier alimento   · Latha Ligas de cortar, platos, utensilios y mostradores con [place] y jabón antes y después de preparar cualquier alimento   · enjuague de frutas crudas y verduras minuciosamente bajo chorro de agua antes de comer   · [employer] y la carne cruda separada de otros alimentos y usar tableros/utensilios de milan diferentes para manejar carne cruda de otros alimentos   · poner alimentos cocidos en un plato recién limpio   · Cocine todos los alimentos zayda   · Deseche los alimentos que se hoffman dejado hacia fuera para más de 2 horas   · Refrigere o congele alimentos que pueden echar a perder     Hay jailene alimentarias particulares riesgos que tener en cuenta y evitar ya que pueden causar grave dañan a cummings reuben des  * Listeria (naif bacteria perjudicial)   · no comer salchichas o embutidos (a menos que esté recalentados hasta cocer al vapor caliente)   · no coma quesos blandos (tales romario Feta, Brie, Camembert) a menos que específicamente se etiquetan romario siendo "hecho con Sherrine Petrin"   · no fer leche cruda (sin pasteurizar)   · no comer patés refrigerados o se separa de la carne   · no coma mariscos ahumados refrigerados a menos que sea en un plato cocinado romario naif cacerola    * Sujata (un metal que se encuentra en ciertos pescados en niveles altos)   · no coma tiburón, lofolátilo, caballa o pez franca   · no coma más de 12 onzas por semana de camarón, salmón, abadejo o bagre   · al comer atún, puede tener hasta 6 onzas por semana de atún enlatado o WATZING a 12 onzas de atún enlatado    * Toxoplasma (parásito dañino)   · carne de cook o antes de comer   · usar guantes jardinería o manipulación de arena del arenero infantil   · si tienes un dilcia, pídale a alguien que cambie la caja de arena omid el [facility]  Si tienes que limpiar usted mismo, asegúrese de [employer] beth después con agua tibia y [place]     · no conseguir un dilcia nuevo omid el [facility]wise [facility]            EJERCICIO Y [facility]    El ejercicio tiene muchos benficios para ustedArvis Piano:   · Mejora tu postura y apariencia   · Nelly el dolor de espalda   · Mejora la circulacion   · Aumenta la flexibilidad   · Disminuye el estres   · Shaun Nickel ayuda a sentirse zayda   · Te da energia   · Ayuda a que pueda dormir   · Dauna Pay y estira tus músculos, que pueden ayudar a aliviar los malestares del embarazo   · Aumenta cummings resistencia y flexibilidad     Más armando que va en mano de obra, el más rápido y más fácil que será cummings recuperación después del nacimiento  El ejercicio puede ser merchant para cummings bebé [place]  Tanto de se siente tranquilo y pena después de un entrenamiento  Además, cummings bebé le gusta el movimiento  Cuanto tiempo de ejercicio   Consulte con cummings proveedor de jatinder antes de comenzar y asegurar de que usted esta lo suficientemente naomy romario para empezar hacer ejercicio  Si haces ejercicios antes del embarazo, es aceptable para hacer ejercicio moderado de 30 minutos o más cada día  Si no, empezar con 20 minutos al día, 3 veces por semana  Si se activan antes de cummings embaraza, es seguro continuar  Si no estas acustombrada a correr, el embarazo no es un buen momento para empezar  El Primer Trimestre   En los primeros jailene meses puede sentirse cansada y enferma de cummings estómago  Ejercicio sólo cuando se sienta mejor  Sin embargo, incluso un poco de [place] puede aumentar cummings energía  Considere naif caminata, estiramiento o poco de yoga  El OTROUZA Trimestre   Podría tener más [place], así que aprovechar los tiempos cuando se siente zayda para hacer actividades para disfrutar  Seguros ejercicios son aerobicos de bajo impacto (que elevan cummings ritmo cardiaco), fácil de bailar, carminar, nadar, ciclismo estacionario, esquí fácil, y yoga  Ir fácil en deportes de "alto impacto" romario correr, tenis, o deportes que pueden causar caída, romario el esquí alpino, o paseos a trung  El Tercer Trimestre   Sentirse mas cansada y Agia Thekla aubrey de espalda por el peso extra que Sky Montelongo    Tu tercer trimestre es un momento importante para utilizar naif buena postura, doble las rodillas para recoger cosas, y no levante objetos pesados  Normas de Seguridad   · [employer] siempre antes y despues del ejercicio  · Nunca entrenamiento tan joel que no puedes hablar al [employer]  · No ejercer en tiempo muy caliente o muy frio  · Beber mucha agua antes, omid, y despues del ejercicio  · Ser conscientes de cummings nivel de comodidad, dejar lo que estas hacienda si tienes dolor, si se sientes debil, o si estas agotada  · Evitar acostarse sobre cummings espalda despues de cummings primer trimester, por que esta posicion puede disminuir el flujo de carlos a cummings utero  NÁUSEAS Y VÓMITOS EN EL EMBARAZO    1  comer comidas y meriendas poco a poco   2  comer cada 1-2 horas para evitar el estómago lleno  3  no saltarse las comidas, evitar el estómago vacío  4  comer un bocado antes de levantarse de la cama  5  Evite alimentos y bebidas con un Acopio  6  evitar la deshidratación: beber suficiente líquido para mantener cummings orina de color amarillo pálido  7  beber líquidos antes de naif comida para minimizar el efecto de un estómago lleno  8  limitar la cantidad de café y bebidas que contienen cafeína  9  eliminar picante, olor, alto de grasa (alimentos fritos), ácido (productos de [place]) y alimentos dulces  10  líquido que contiene shahnaz, menta o naranja puede ser generalmente zayda tolerado  11  snacks y comidas que contienen proteína baja en grasa (jorge luis magras, pescado, aves de menendez y [place]) junto con comer carbohidratos fácilmente digeribles (frutas, arroz, pan carlos, galletas y cereal seco) pueden ser mejor toleradas  12  los alimentos con el jengibre pueden ser [employer]  Trate de usar polvo de raíz de jengibre, [place], o extraer (hasta 1000mg por día)  13  beber líquidos en pequeñas cantidades    14  trate de rubina vitamina B6 (50mg al acostarse, 25mg en la mañana y 25mg en la tarde) con Unisom/doxilamina (25mg al Jin Tal)  [address] y los vómitos persisten, póngase en contacto con la oficina  [place]    1  Es Rafita Kane? Las relaciones sexuales regularmente son perfectamente Selestine Maximus y no le hacen amie a cummings fernando que esta creciendo  Alkol Mouse y los orgasmos estan zayda a menos que usted tenga algun problema medico   Si usted esta preocupada, discutalo con cummings proveedor de jatinder  2  Blase Hoops mcduffie seguido esta zayda tener relaciones sexuales? Tener relaciones sexuales frecuentemente no le hace amie a cummings fernando si usted esta teniendo un embarazo saludable  3  Tener relaciones sexuales puede provocar un aborto involuntario? No hay ninguna informacion que relacione tener un orgasmo con tener un aborto involuntario  Sin embargo, si usted tiene historia de jenifer tenido un aborto involuntario, cummings proveedor de jatinder le puede recomendar que tenga cuidado y que no tenga relaciones sexuales y orgasmos omid el primer trimestre del [facility] Johan  4  Que puede pasar si siento que el fernando se mueve despues de tener relaciones sexuales? No  El fernando esta muy zayda protegido en el utero  Y, usualmente el fernando se mueve mucho sin importar lo que usted leticia  5  Esta zayda que mi paresh ponga peso sobre mi estomago? No, especialmente cuando cummings estomago empiece a crecer mas  Encuentren otras posiciones para tender relaciones sexuales omid el embarazo  Traten de tenerrelaciones sexuales acostados de lado o usted puede ponerse encima de cummings paresh  No se acueste boca arriba  6  Tener relaciones sexuales puede causare un parto prematuro? Normalmente no  Sin embarago, los orgasmos causan que el utero tenga contracciones ligeras si usted esta cerca al Kim de eliezer a kyle    Si usted tiene historia de haver tenido un parto prematuro, cummings proveedor de [employer] a recomendar que no tenga relaciones sexuales y [place]  Tambien la estimulacion de los senos causa que el utero se contraiga si usted esta cerca del Kim de eliezer a kyle  Preguentele a cummings proveedor de jatinder si esto es seguro para usted  [address] son seguras jv el Olayinka Roman? No   Nunca deje que cummings paresh le sople en cummings vagina  No ponga ninjun objeto en cummings vagina que le pueda hacer amie o causar naif infeccion  Si usted tiene sangrado excesivo o si cummings thomas se rompe mientras esta teniendo [employer], detengase y llame a cummings proveedor de jatinder inmediatamente  8  Que pasa si yo no tengo ganas de tener relaciones sexuales? Hable francamente con cummings paresh  Al comienzo del Olayinka Roman, usted puede estar muy cansada o puede sentirse mal del estomago  En las ultimas semanas del [facility] cambio fisicos que usted esta teniendo pueden hacerla sentir muy grand o muy incomoda para tener relaciones sexuales  Para perry tiempo puede que usted iveth pensando mas en la maternidad que en tener relaciones sexuales  9  Hay algunas ocasiones en las que debiera evitar tener relaciones sexuales? Si, si:  · tener relaciones sexuales es doloroso  · usted tiene sangrado vaginal  · usted tiene parto prematuro  · Cummings thomas se rompio  · usted esta embarazada con gemelos o mas  · usted o cummings paresh tienen cualquier otra enfermedad venera o naif enfermedad transmitida por relaciones sexuales  · usted no puede encontrar naif posicion comoda          [place] EL EMBARAZO  Llame a Yaquelin Dori al [phone] para cualquiera de los siguientes:    1  sangrado vaginal  2  Dolor abdominal amanda que no desaparece  3  Fiebre (más de 100 4 y no se dom con Tylenol)  4  Vómitos persistentes que torres más de 24 horas  5  Dolor de pecho  6  Dolor o ardor al orinar  7  Dolor de maría severo que no se resuelve con Tylenol  8  Visión borrosa o hillary puntos en cummings visión    9  Hinchazón repentina de cummings breana o beth  10  Enrojecimiento, hinchazón o dolor en naif pierna  11  Un aumento de peso repentino en pocos días  12  Contar los movimientos fetales del bebé  (después de 28 semanas o el sexto mes de embarazo)  15  Naif pérdida de líquido acuoso de la vagina: puede ser un chorro, un goteo o naif humedad continua  14  Después de 20 semanas de embarazo, calambres rítmicos (más de 4 por hora) o menstruales romario dolor bajo / Yahaira Toledo MATERNA     BENEFICIOS PARA LOS BEBÉS   · sistemas inmunológicos más zane (menos alergias, eczema, asma y cáncer infantil)   · menos diarrea y estreñimiento u otras enfermedades GI   · menos resfriados e infecciones del oído   · mejor visión y dientes (menos cavidades)   · mejora el IQ   · menores tasas de diabetes y obesidad en la infancia     BENEFICIOS PARA LAS MADRES   · promueve la pérdida de peso más rápida después del parto   · sylvie riesgo para la depresión postparto   · sylvie riesgo para los cánceres Pia, útero y ovario   · sylvie riesgo para la osteoporosis en desarrollo con la edad   · siempre es más fácil que fórmula - correcto, limpio, y la temperatura adecuada   · menos costosa que la fórmula es gratis!!!     CLAVES PARA NAIF LACTANCIA EXITOSA   · mantener bebé piel a piel hasta después de la primera alimentación evento   · tener a bebé en cummings cuarto con usted omid cummings estadía en el hospital después del parto   · evitar cualquier botella de alimentación (a menos que sea médicamente necesario)   · limitar el uso de chupones y pañales   · Pida ayuda si usted está teniendo problemas    consultores de lactancia (que se especializan en la lactancia) están disponibles para ayudarle a   · naif dieta saludable para mamá    comiendo naif variedad de alimentos y raciones con moderación     COSAS QUE DEBES SABER SOBRE LA LACTANCIA   · mayoría de los medicamentos se considera compatible con la lactancia materna por [address] consultarlo con cummings médico o en lactancia antes de rubina un medicamento nuevo    para estar seguro es seguro   · alcohol (cerveza, vino, licor) puede transmitirse de la madre al bebé a través de la Clara    un ocasional, social bebida es considerado aceptable por [address]    más que eso deben evitarse   · la lactancia materna es NO es un método para evitar embarazo   · nicotina (cigarrillos) puede pasar de la madre al bebé a través de la Clara    sin embargo, para las [employer], es aún más saludable para amamantar que use la fórmula   · cafeína debería limitarse a 1-3 tazas por día    incluye café, refrescos, bebidas energéticas           VACUNAS JV EL EMBARAZO    TDAP  La tos ferina (o tos [place]) puede ser grave para cualquier persona, s[place]ia para cummings recién nacido, puede ser mortal  Hasta 20 recién nacidos mueren cada año en los [place] debido a la tos [place]  Aproximadamente la mitad de los bebés menores de 1 año de edad que contraen tos ferina necesitan tratamiento en el hospital  Cuanto más joven es el bebé cuando él o carol son la tos Ethelyn Gutting probable es que él o carol tendrá que ser tratado en un hospital  Cuando usted recibe la vacuna contra la tos ferina (Tdap) jv cummings embarazo, cummings cuerpo creará anticuerpos protectores y algunos de ellos pasan a cummings bebé antes de nacer  Estos anticuerpos pueden ayudar a proteger a cummings bebé contraigan la tos ferina hasta que tienen edad suficiente para recibir la vacuna ellos mismos (generalmente alrededor de 6 meses de edad)  INFLUENZA  Cambios en las funciones inmunológica, del corazón y pulmón jv el embarazo te hacen más susceptible a padecer seriamente enfermo de la gripe  Coger la gripe también aumenta las posibilidades de problemas graves para cummings bebé en desarrollo, incluyendo la entrega y el trabajo de [employer]   Se recomienda que todas las mujeres que están embarazadas jv la temporada de gripe deben recibir naif vacuna contra la influenza  USO DE CINTURÓN DE SEGURIDAD EN EL EMBARAZO    Si usted está embarazada o no, deben usar el cinturón de seguridad cada vez que estás en un coche  El cinturón de seguridad es la mejor protección para ambos usted y cummings reuben des  Para utilizar correctamente el cinturón de seguridad omid el embarazo, puede que deba ajustar el asiento delantero varias veces romario crece de modo que siempre hay por lo menos 10 pulgadas entre el centro de cummings pecho y el bert de manejar o del panel de control, y llegar cómodamente a los pedales  La chavez del hombro deben colocar sobre el pecho (entre amanda senos) y lejos de cummings amy  El cinturón de seguridad debe fijarse por debajo de cummings vientre para que se ajuste cómodamente a través de las caderas y la pelvis ósea  NO debe desactivar la bolsas de aire de cummings vehículo  Bolsas de aire y cinturones de seguridad funcionan mejor cuando se utilizan juntos para proteger a cummings reuben del unborn

## 2020-09-09 NOTE — LETTER
Proof of Pregnancy Letter    Gus Strickland  2000  [phone]        09/09/20      Sherrill Naranjo is a patient at our facility  Gus Strickland Estimated Date of Delivery: 4/2/2021       Any questions or concerns, please feel free to contact our office      Sincerely,     [facility]

## 2020-09-15 ENCOUNTER — APPOINTMENT (OUTPATIENT)
Dept: LAB | Facility: HOSPITAL | Age: 20
End: 2020-09-15
Attending: OBSTETRICS & GYNECOLOGY
Payer: COMMERCIAL

## 2020-09-15 ENCOUNTER — TELEPHONE (OUTPATIENT)
Dept: OBGYN CLINIC | Facility: CLINIC | Age: 20
End: 2020-09-15

## 2020-09-15 DIAGNOSIS — Z34.91 PRENATAL CARE IN FIRST TRIMESTER: ICD-10-CM

## 2020-09-15 LAB
ABO GROUP BLD: NORMAL
BACTERIA UR QL AUTO: ABNORMAL /HPF
BASOPHILS # BLD AUTO: 0.1 THOUSANDS/ΜL (ref 0–0.1)
BASOPHILS NFR BLD AUTO: 1 % (ref 0–1)
BILIRUB UR QL STRIP: NEGATIVE
BLD GP AB SCN SERPL QL: NEGATIVE
CLARITY UR: CLEAR
COLOR UR: YELLOW
EOSINOPHIL # BLD AUTO: 0 THOUSAND/ΜL (ref 0–0.4)
EOSINOPHIL NFR BLD AUTO: 0 % (ref 0–6)
ERYTHROCYTE [DISTWIDTH] IN BLOOD BY AUTOMATED COUNT: 16.1 %
GLUCOSE UR STRIP-MCNC: NEGATIVE MG/DL
HBV SURFACE AG SER QL: NORMAL
HCT VFR BLD AUTO: 37.7 % (ref 36–46)
HCV AB SER QL: NORMAL
HGB BLD-MCNC: 12.3 G/DL (ref 12–16)
HGB UR QL STRIP.AUTO: 10
KETONES UR STRIP-MCNC: NEGATIVE MG/DL
LEUKOCYTE ESTERASE UR QL STRIP: NEGATIVE
LYMPHOCYTES # BLD AUTO: 2.3 THOUSANDS/ΜL (ref 0.5–4)
LYMPHOCYTES NFR BLD AUTO: 23 % (ref 25–45)
MCH RBC QN AUTO: 24.9 PG (ref 26–34)
MCHC RBC AUTO-ENTMCNC: 32.6 G/DL (ref 31–36)
MCV RBC AUTO: 76 FL (ref 80–100)
MICROCYTES BLD QL AUTO: PRESENT
MONOCYTES # BLD AUTO: 0.7 THOUSAND/ΜL (ref 0.2–0.9)
MONOCYTES NFR BLD AUTO: 6 % (ref 1–10)
MUCOUS THREADS UR QL AUTO: ABNORMAL
NEUTROPHILS # BLD AUTO: 7.1 THOUSANDS/ΜL (ref 1.8–7.8)
NEUTS SEG NFR BLD AUTO: 70 % (ref 45–65)
NITRITE UR QL STRIP: NEGATIVE
NON-SQ EPI CELLS URNS QL MICRO: ABNORMAL /HPF
PH UR STRIP.AUTO: 6 [PH]
PLATELET # BLD AUTO: 364 THOUSANDS/UL (ref 150–450)
PLATELET BLD QL SMEAR: ADEQUATE
PMV BLD AUTO: 8.1 FL (ref 8.9–12.7)
PROT UR STRIP-MCNC: NEGATIVE MG/DL
RBC # BLD AUTO: 4.93 MILLION/UL (ref 4–5.2)
RBC #/AREA URNS AUTO: ABNORMAL /HPF
RBC MORPH BLD: NORMAL
RH BLD: POSITIVE
RUBV IGG SERPL IA-ACNC: >175 IU/ML
SP GR UR STRIP.AUTO: 1.02 (ref 1–1.04)
SPECIMEN EXPIRATION DATE: NORMAL
UROBILINOGEN UA: NEGATIVE MG/DL
WBC # BLD AUTO: 10.2 THOUSAND/UL (ref 4.5–11)
WBC #/AREA URNS AUTO: ABNORMAL /HPF

## 2020-09-15 PROCEDURE — 80081 OBSTETRIC PANEL INC HIV TSTG: CPT

## 2020-09-15 PROCEDURE — 81001 URINALYSIS AUTO W/SCOPE: CPT

## 2020-09-15 PROCEDURE — 36415 COLL VENOUS BLD VENIPUNCTURE: CPT

## 2020-09-15 PROCEDURE — 86803 HEPATITIS C AB TEST: CPT

## 2020-09-15 PROCEDURE — 87086 URINE CULTURE/COLONY COUNT: CPT

## 2020-09-15 PROCEDURE — 83020 HEMOGLOBIN ELECTROPHORESIS: CPT

## 2020-09-16 ENCOUNTER — INITIAL PRENATAL (OUTPATIENT)
Dept: OBGYN CLINIC | Facility: CLINIC | Age: 20
End: 2020-09-16

## 2020-09-16 VITALS
BODY MASS INDEX: 38.49 KG/M2 | WEIGHT: 226 LBS | HEART RATE: 78 BPM | SYSTOLIC BLOOD PRESSURE: 110 MMHG | TEMPERATURE: 98.4 F | DIASTOLIC BLOOD PRESSURE: 70 MMHG

## 2020-09-16 DIAGNOSIS — Z3A.11 11 WEEKS GESTATION OF PREGNANCY: Primary | ICD-10-CM

## 2020-09-16 DIAGNOSIS — Z12.4 CERVICAL CANCER SCREENING: ICD-10-CM

## 2020-09-16 DIAGNOSIS — Z11.3 SCREEN FOR STD (SEXUALLY TRANSMITTED DISEASE): ICD-10-CM

## 2020-09-16 PROBLEM — Z30.011 ENCOUNTER FOR INITIAL PRESCRIPTION OF CONTRACEPTIVE PILLS: Status: RESOLVED | Noted: 2020-02-06 | Resolved: 2020-09-16

## 2020-09-16 LAB
BACTERIA UR CULT: NORMAL
HIV 1+2 AB+HIV1 P24 AG SERPL QL IA: NORMAL
RPR SER QL: NORMAL

## 2020-09-16 PROCEDURE — G0145 SCR C/V CYTO,THINLAYER,RESCR: HCPCS | Performed by: OBSTETRICS & GYNECOLOGY

## 2020-09-16 PROCEDURE — PNV: Performed by: OBSTETRICS & GYNECOLOGY

## 2020-09-16 PROCEDURE — 87591 N.GONORRHOEAE DNA AMP PROB: CPT | Performed by: OBSTETRICS & GYNECOLOGY

## 2020-09-16 PROCEDURE — 87491 CHLMYD TRACH DNA AMP PROBE: CPT | Performed by: OBSTETRICS & GYNECOLOGY

## 2020-09-16 NOTE — LETTER
September 16, 2020     Patient: Jim Pond   YOB: 2000   Date of Visit: 9/16/2020       To Whom it May Concern:    Jim Pond is under my professional care  She was seen in my office on 9/16/2020  She may return to work with limitations   She may not lift more than 30 lbs  She may drink water whenever she needs       If you have any questions or concerns, please don't hesitate to call           Sincerely,      Dr Barbara Mccormick, DO        CC: No Recipients

## 2020-09-16 NOTE — PROGRESS NOTES
OB/GYN  PRENATAL H&P VISIT  Gus Lazarojennifer  2020  7:43 AM  Dr Barbie Red DO    Used  [address]  Patient is a [id number] at 11w5d here for initial prenatal H&P  This is an intended and desired pregnancy  She has a past obstetric history of PPROM at 32 weeks with a  demise via CS and a repeat CS at 40 weeks, both of these done in her [place] [facility]  She states both of these were low transverse    She is currently doing well  She works at [employer], she does heavy lifting and desires a note for work today    She endorses a hx of gonorrhea, denies a hx of TB or close contacts with persons with TB  She has never had MRSA  She denies a family history of inheritable conditions such as physical or intellectual disabilities, birth defects, blood disorders, heart or neural tube defects  She denies recent travel or travel planned in the near future  She denies use of nicotine or recreational drug use  She denies use of ETOH  She denies vaginal bleeding, cramping, leakage, abnormal discharge  OB History    Para Term  AB Living   3 2 1 1 0 1   SAB TAB Ectopic Multiple Live Births   0 0 0 0 2      # Outcome Date GA Lbr Tomasz/2nd Weight Sex Delivery Anes PTL Lv   3 Current            2 Term  40w0d  2948 g (6 lb 8 oz) F CS-Unspec  N ALKA   1   32w0d    CS-Unspec   ND      Birth Comments: PPROM       Review of Systems   Constitutional: Negative  HENT: Negative  Genitourinary: Negative  No past medical history on file  Past Surgical History:   Procedure Laterality Date     SECTION      2         OBJECTIVE  There were no vitals filed for this visit  Physical Exam  Constitutional:       Appearance: Normal appearance  She is obese  Cardiovascular:      Rate and Rhythm: Normal rate and regular rhythm     Pulmonary:      Effort: Pulmonary effort is normal       Breath sounds: Normal breath sounds  Abdominal:      Palpations: Abdomen is soft  Neurological:      Mental Status: She is alert  Psychiatric:         Mood and Affect: Mood normal          Behavior: Behavior normal          ASSESSMENT AND PLAN    22 y o , [id number], with LMP 2020 , at 11w5d here for her prenatal H&P  FHT 160s by ultrasound    Pregnancy: H&P completed today  PN Labs reviewed today, they were within normal limits  Her urinalysis showed blood and epithelial mucus, likely not a clean catch  Labor expectations discussed with patient, including appointment schedule, nutrition, exercise, medications, sexual intercourse, and nausea/vomiting  Patient's BMI is 38  Recommended weight gain is 10 lbs  Screening: Pap smear collected  GC/CT collected  Sequential screening reviewed with patient - will proceed with sequential screen at her [facility] appointments  Consents: Delivery process including potential OVD and  reviewed  Sign delivery consent form at 28 weeks  Labor: For analgesia, patient plans on repeat CS  Contraception: Different methods of contraception were discussed with patient, including progesterone only oral pills, depo provera, nexplanon, mirena, and paragard  Patient would like to use IUD, unsure if she would like it during the CS or postpartum during postpartum phase  Follow up: RTC in 4 weeks  Precautions regarding labor, leakage, bleeding, and fetal movement reviewed      Roge Khan DO  2020  7:43 AM

## 2020-09-16 NOTE — PROGRESS NOTES
OB/GYN  PRENATAL H&P VISIT  Gus Strickland  2020  4:04 PM  Dr Jorgito Youssef, DO    [address]  Patient is a [id number] at 11w5d here for initial prenatal H&P  This is an intended and desired pregnancy  She has a past obstetric history of PPROM at 32 weeks with a  demise via CS and a repeat CS at 40 weeks, both of these done in her [place] of the [facility]  G1  passed away 2 months after birth from SIDS  It stayed in the NICU for a week postpartum and then was discharged to home  She states both of her  sections were low transverse  Patient has work paperwork for the [employer] that she needs filled out  I will fax it over  She is currently doing well  She works at [employer], she does heavy lifting and desires a note for work today    She endorses a hx of gonorrhea, denies a hx of TB or close contacts with persons with TB  She has never had MRSA  She denies a family history of inheritable conditions such as physical or intellectual disabilities, birth defects, blood disorders, heart or neural tube defects  She denies recent travel or travel planned in the near future  She denies use of nicotine or recreational drug use  She denies use of ETOH  She denies vaginal bleeding, cramping, leakage, abnormal discharge  OB History    Para Term  AB Living   3 2 1 1 0 1   SAB TAB Ectopic Multiple Live Births   0 0 0 0 2      # Outcome Date GA Lbr Tomasz/2nd Weight Sex Delivery Anes PTL Lv   3 Current            2 Term  40w0d  2948 g (6 lb 8 oz) F CS-Unspec  N ALKA   1   32w0d    CS-Unspec   ND      Birth Comments: PPROM       Review of Systems   Constitutional: Negative  HENT: Negative  Genitourinary: Negative  No past medical history on file      Past Surgical History:   Procedure Laterality Date     SECTION      2         OBJECTIVE  Vitals:    20 1313   BP: 110/70   Pulse: 78   Temp: 98 4 °F (36 9 °C)     Physical Exam  Constitutional:       Appearance: Normal appearance  She is obese  Cardiovascular:      Rate and Rhythm: Normal rate and regular rhythm  Pulmonary:      Effort: Pulmonary effort is normal       Breath sounds: Normal breath sounds  Abdominal:      Palpations: Abdomen is soft  Neurological:      Mental Status: She is alert  Psychiatric:         Mood and Affect: Mood normal          Behavior: Behavior normal          ASSESSMENT AND PLAN    22 y o , , with /70   Pulse 78   Temp 98 4 °F (36 9 °C)   Wt 103 kg (226 lb)   LMP 2020 , at 11w5d here for her prenatal H&P  FHT 160s by ultrasound    Pregnancy: H&P completed today  PN Labs reviewed today, they were within normal limits  Her urinalysis showed blood and epithelial mucus, likely not a clean catch  Labor expectations discussed with patient, including appointment schedule, nutrition, exercise, medications, sexual intercourse, and nausea/vomiting  Patient's BMI is 38  Recommended weight gain is 10 lbs  Screening: Pap smear collected  GC/CT collected  Sequential screening reviewed with patient - will proceed with sequential screen at her [facility] appointments  Consents: Delivery process including potential OVD and  reviewed  Sign delivery consent form at 28 weeks  Labor: For analgesia, patient plans on repeat CS  Contraception: Different methods of contraception were discussed with patient, including progesterone only oral pills, depo provera, nexplanon, mirena, and paragard  Patient would like to use IUD, unsure if she would like it during the CS or postpartum during postpartum phase  Follow up: RTC in 4 weeks  Precautions regarding labor, leakage, bleeding, and fetal movement reviewed      Deya Patel DO  2020  4:04 PM

## 2020-09-17 LAB
DEPRECATED HGB OTHER BLD-IMP: 0 %
HGB A MFR BLD: 2.1 % (ref 1.8–3.2)
HGB A MFR BLD: 97.9 % (ref 96.4–98.8)
HGB C MFR BLD: 0 %
HGB F MFR BLD: 0 % (ref 0–2)
HGB FRACT BLD-IMP: NORMAL
HGB S BLD QL SOLY: NEGATIVE
HGB S MFR BLD: 0 %

## 2020-09-21 LAB
C TRACH DNA SPEC QL NAA+PROBE: NEGATIVE
LAB AP GYN PRIMARY INTERPRETATION: NORMAL
Lab: NORMAL
N GONORRHOEA DNA SPEC QL NAA+PROBE: NEGATIVE

## 2020-09-22 ENCOUNTER — TELEPHONE (OUTPATIENT)
Dept: OBGYN CLINIC | Facility: CLINIC | Age: 20
End: 2020-09-22

## 2020-09-23 ENCOUNTER — ROUTINE PRENATAL (OUTPATIENT)
Dept: PERINATAL CARE | Facility: OTHER | Age: 20
End: 2020-09-23
Payer: COMMERCIAL

## 2020-09-23 VITALS
HEART RATE: 80 BPM | BODY MASS INDEX: 37.19 KG/M2 | HEIGHT: 65 IN | WEIGHT: 223.2 LBS | TEMPERATURE: 98 F | SYSTOLIC BLOOD PRESSURE: 100 MMHG | DIASTOLIC BLOOD PRESSURE: 61 MMHG

## 2020-09-23 DIAGNOSIS — Z34.91 PRENATAL CARE IN FIRST TRIMESTER: ICD-10-CM

## 2020-09-23 DIAGNOSIS — Z3A.12 12 WEEKS GESTATION OF PREGNANCY: ICD-10-CM

## 2020-09-23 DIAGNOSIS — O99.211 OBESITY DURING PREGNANCY IN FIRST TRIMESTER: ICD-10-CM

## 2020-09-23 DIAGNOSIS — Z36.82 ENCOUNTER FOR NUCHAL TRANSLUCENCY TESTING: ICD-10-CM

## 2020-09-23 DIAGNOSIS — O34.219 MATERNAL CARE FOR SCAR FROM PREVIOUS CESAREAN DELIVERY, UNSPECIFIED PRIOR CESAREAN DELIVERY TYPE: Primary | ICD-10-CM

## 2020-09-23 PROCEDURE — 76801 OB US < 14 WKS SINGLE FETUS: CPT | Performed by: OBSTETRICS & GYNECOLOGY

## 2020-09-23 PROCEDURE — 3008F BODY MASS INDEX DOCD: CPT

## 2020-09-23 PROCEDURE — 99242 OFF/OP CONSLTJ NEW/EST SF 20: CPT | Performed by: OBSTETRICS & GYNECOLOGY

## 2020-09-23 PROCEDURE — 76813 OB US NUCHAL MEAS 1 GEST: CPT | Performed by: OBSTETRICS & GYNECOLOGY

## 2020-09-23 RX ORDER — ASPIRIN 81 MG/1
162 TABLET ORAL DAILY
Qty: 180 TABLET | Refills: 3 | Status: SHIPPED | OUTPATIENT
Start: 2020-09-23 | End: 2021-03-31 | Stop reason: HOSPADM

## 2020-09-23 NOTE — LETTER
2020     Jacob Avalos MD  [address] [address], [address]  [address] [place] [address]  [address] [place] [address] [facility]    Patient: Taina Montoya   YOB: 2000   Date of Visit: 2020       Dear Dr Koko Michelle: Thank you for referring Taina Montoya to me for evaluation  Below are my notes for this consultation  If you have questions, please do not hesitate to call me  I look forward to following your patient along with you  Sincerely,        Lupe Polanco MD        CC: No Recipients  Lupe Polanco MD  2020 10:54 AM  Sign when Signing Visit  CONSULT NOTE    Jacob Avalos MD  [address] [facility] [address], [address]  [address] [place] [address]     Thank you for referring your Taina Montoya for a Maternal-Fetal Medicine Consultation:  Below is my consultation  Please note I communicated to the patient utilizing  [phone]    Thank you very much for requesting a consultation on this very nice patient for the indication of genetic screening  This is the patient's 3rd pregnancy  Her 1st pregnancy resulted in spontaneous  birth at 28 weeks by  delivery after  premature rupture membranes  There was a  demise at 10 months secondary to aspiration  Her next pregnancy was managed in the [facility] without progesterone or cervical length screening and she had a full-term repeat  section that was uncomplicated  She has obesity with a current BMI of greater than 37  She currently takes prenatal vitamins and vitamin B6 and has no known drug allergies  She has no other significant medical or surgical history  She denies the current use of tobacco, alcohol, or drugs  She has no significant family medical history  A review of systems is otherwise negative      We discussed the options for genetic screening, including but not limited to first trimester screening, second trimester screening, combined first and second trimester screening, noninvasive prenatal testing (NIPT) for patients at high risk and diagnostic screening through the use of CVS and amniocentesis  We discussed the risks and benefits of each approach including the sensitivities and false positive rates as well as the difference between a screening test and a diagnostic test   At the conclusion of our discussion the patient elected Sequential Screening to delineate her risk for fetal aneuploidy however she does not currently have insurance and did not undergo the blood work as planned  Consideration of a quad screen can be undertaken given cost considerations  The strongest predictor of  birth (PTB) is a prior spontaneous  birth (sPTB)  Spontaneous  birth (sPTB) recurs in 28 to 50 % of pregnancies, and tends to recur at similar gestational ages  Based upon the study of Myrna et al, women with a prior sPTB between 12 and 36 weeks of gestation should be offered empiric prophylactic treatment with weekly IM progesterone therapy (17 P)  The initial dose of 17 P should be started at 16 weeks of gestation optimally  17 P should be given every week  17 P should be continued through 36 weeks of gestation  Based upon the Meis study, administration of 17 P will reduce risk for recurrence of sPTB by about 30 percent  Gus and I discussed the recently published PROLONG study, which showed no significant benefit of treatment with 17 P in reduction of recurrence of sPTB  Currently, the FDA is reviewing data related to use of 17 P ([place]) during pregnancy  Current recommendations by ACOG and SMFM suggest continuation of prior guidelines with respect to use of 17 P during pregnancy until the FDA review is complete    We discussed that since she did not utilize any progesterone noted she undergo cervical length screening during her prior pregnancy, she is likely at reduced risk for recurrent spontaneous  birth and I did not recommend progesterone or cervical length screening  Given the patient's history of adverse outcome and obesity, I recommend initiating low dose aspirin therapy  A recent meta-analysis yielded risk reductions of 24% for preeclampsia, 20% for intrauterine growth restriction, and 14% for  birth, with an absolute risk reduction of 2-5% for preeclampsia, one to 5% for intrauterine growth restriction, and 2-4% for  birth  In this study, there was no identified risk of harm to the mother or fetus but long-term evidence was somewhat limited  Given the overall safety profile and risk-benefit analysis, I recommend 162 mg of aspirin be taken Daily until delivery  I reviewed these recommendations with the patient and answered all of her questions to apparent satisfaction  The implications of obesity and pregnancy are significant  The level of obesity is directly related to the risk of adverse pregnancy outcomes including but not limited to, risk of diabetes, hypertensive disorders of pregnancy, macrosomia, intrauterine growth restriction, labor and shoulder dystocias,  section, and increased risk of stillbirth  Recommend discussing the current weight gain recommendations for women with obesity and discussing good dietary practices as well as the safety of exercise in pregnancy  I recommend the patient gain no more than 11-20 pounds throughout her entire pregnancy, increase her exercise and follow healthy dietary habits  Consider referral to a dietitian should the patient have difficulty following the aforementioned recommendations  Recommend third trimester growth ultrasounds to screen for fetal growth problems as well as ensuring the patient is appropriately screened for pregestational and gestational diabetes  Additional  surveillance is recommended starting at 36 weeks in those women with a BMI of greater than 40 given the increased risk for stillbirth      We discussed follow-up in detail and I recommend an anatomy ultrasound be scheduled for 20 weeks gestation  Thank you very much for allowing us to participate in the care of this very nice patient  Should you have any questions, please do not hesitate to contact our office  Please note, in addition to the time spent discussing the results of the ultrasound, I spent approximately 30 minutes of face-to-face time with the patient, greater than 50% of which was spent in counseling and the coordination of care for this patient  Portions of the record may have been created with voice recognition software  Occasional wrong word or "sound a like" substitutions may have occurred due to the inherent limitations of voice recognition software  Read the chart carefully and recognize, using context, where substitutions have occurred  Hans Michaels MD  Attending Physician, [place]

## 2020-09-23 NOTE — PROGRESS NOTES
CONSULT NOTE    Steff Freitas MD  [address] [address], [address]  [address] [place] [address], [address] [place] [address]     Thank you for referring your Stefani Baltazar for a Maternal-Fetal Medicine Consultation:  Below is my consultation  Please note I communicated to the patient utilizing  [phone]    Thank you very much for requesting a consultation on this very nice patient for the indication of genetic screening  This is the patient's 3rd pregnancy  Her 1st pregnancy resulted in spontaneous  birth at 28 weeks by  delivery after  premature rupture membranes  There was a  demise at 10 months secondary to aspiration  Her next pregnancy was managed in the [facility] without progesterone or cervical length screening and she had a full-term repeat  section that was uncomplicated  She has obesity with a current BMI of greater than 37  She currently takes prenatal vitamins and vitamin B6 and has no known drug allergies  She has no other significant medical or surgical history  She denies the current use of tobacco, alcohol, or drugs  She has no significant family medical history  A review of systems is otherwise negative  We discussed the options for genetic screening, including but not limited to first trimester screening, second trimester screening, combined first and second trimester screening, noninvasive prenatal testing (NIPT) for patients at high risk and diagnostic screening through the use of CVS and amniocentesis  We discussed the risks and benefits of each approach including the sensitivities and false positive rates as well as the difference between a screening test and a diagnostic test   At the conclusion of our discussion the patient elected Sequential Screening to delineate her risk for fetal aneuploidy however she does not currently have insurance and did not undergo the blood work as planned    Consideration of a quad screen can be undertaken given cost considerations  The strongest predictor of  birth (PTB) is a prior spontaneous  birth (sPTB)  Spontaneous  birth (sPTB) recurs in 28 to 50 % of pregnancies, and tends to recur at similar gestational ages  Based upon the study of Myrna et al, women with a prior sPTB between 12 and 36 weeks of gestation should be offered empiric prophylactic treatment with weekly IM progesterone therapy (17 P)  The initial dose of 17 P should be started at 16 weeks of gestation optimally  17 P should be given every week  17 P should be continued through 36 weeks of gestation  Based upon the Meis study, administration of 17 P will reduce risk for recurrence of sPTB by about 30 percent  Gus and I discussed the recently published PROLONG study, which showed no significant benefit of treatment with 17 P in reduction of recurrence of sPTB  Currently, the FDA is reviewing data related to use of 17 P ([place]) during pregnancy  Current recommendations by ACOG and SMFM suggest continuation of prior guidelines with respect to use of 17 P during pregnancy until the FDA review is complete  We discussed that since she did not utilize any progesterone noted she undergo cervical length screening during her prior pregnancy, she is likely at reduced risk for recurrent spontaneous  birth and I did not recommend progesterone or cervical length screening  Given the patient's history of adverse outcome and obesity, I recommend initiating low dose aspirin therapy  A recent meta-analysis yielded risk reductions of 24% for preeclampsia, 20% for intrauterine growth restriction, and 14% for  birth, with an absolute risk reduction of 2-5% for preeclampsia, one to 5% for intrauterine growth restriction, and 2-4% for  birth  In this study, there was no identified risk of harm to the mother or fetus but long-term evidence was somewhat limited    Given the overall safety profile and risk-benefit analysis, I recommend 162 mg of aspirin be taken Daily until delivery  I reviewed these recommendations with the patient and answered all of her questions to apparent satisfaction  The implications of obesity and pregnancy are significant  The level of obesity is directly related to the risk of adverse pregnancy outcomes including but not limited to, risk of diabetes, hypertensive disorders of pregnancy, macrosomia, intrauterine growth restriction, labor and shoulder dystocias,  section, and increased risk of stillbirth  Recommend discussing the current weight gain recommendations for women with obesity and discussing good dietary practices as well as the safety of exercise in pregnancy  I recommend the patient gain no more than 11-20 pounds throughout her entire pregnancy, increase her exercise and follow healthy dietary habits  Consider referral to a dietitian should the patient have difficulty following the aforementioned recommendations  Recommend third trimester growth ultrasounds to screen for fetal growth problems as well as ensuring the patient is appropriately screened for pregestational and gestational diabetes  Additional  surveillance is recommended starting at 36 weeks in those women with a BMI of greater than 40 given the increased risk for stillbirth  We discussed follow-up in detail and I recommend an anatomy ultrasound be scheduled for 20 weeks gestation  Thank you very much for allowing us to participate in the care of this very nice patient  Should you have any questions, please do not hesitate to contact our office  Please note, in addition to the time spent discussing the results of the ultrasound, I spent approximately 30 minutes of face-to-face time with the patient, greater than 50% of which was spent in counseling and the coordination of care for this patient  Portions of the record may have been created with voice recognition software    Occasional wrong word or "sound a like" substitutions may have occurred due to the inherent limitations of voice recognition software  Read the chart carefully and recognize, using context, where substitutions have occurred  Hans Zambrano MD  Attending Physician, [place]

## 2020-10-13 ENCOUNTER — TELEPHONE (OUTPATIENT)
Dept: OBGYN CLINIC | Facility: CLINIC | Age: 20
End: 2020-10-13

## 2020-10-14 ENCOUNTER — ROUTINE PRENATAL (OUTPATIENT)
Dept: OBGYN CLINIC | Facility: CLINIC | Age: 20
End: 2020-10-14

## 2020-10-14 VITALS
TEMPERATURE: 97.2 F | HEART RATE: 74 BPM | BODY MASS INDEX: 37.86 KG/M2 | DIASTOLIC BLOOD PRESSURE: 73 MMHG | WEIGHT: 224 LBS | SYSTOLIC BLOOD PRESSURE: 119 MMHG

## 2020-10-14 DIAGNOSIS — Z3A.15 15 WEEKS GESTATION OF PREGNANCY: ICD-10-CM

## 2020-10-14 DIAGNOSIS — Z00.00 HEALTHCARE MAINTENANCE: ICD-10-CM

## 2020-10-14 DIAGNOSIS — Z34.92 PRENATAL CARE IN SECOND TRIMESTER: Primary | ICD-10-CM

## 2020-10-14 PROBLEM — Z3A.11 11 WEEKS GESTATION OF PREGNANCY: Status: RESOLVED | Noted: 2020-08-25 | Resolved: 2020-10-14

## 2020-10-14 PROCEDURE — 90686 IIV4 VACC NO PRSV 0.5 ML IM: CPT | Performed by: NURSE PRACTITIONER

## 2020-10-14 PROCEDURE — 90471 IMMUNIZATION ADMIN: CPT | Performed by: NURSE PRACTITIONER

## 2020-10-14 PROCEDURE — 99214 OFFICE O/P EST MOD 30 MIN: CPT | Performed by: NURSE PRACTITIONER

## 2020-11-10 ENCOUNTER — PATIENT OUTREACH (OUTPATIENT)
Dept: OBGYN CLINIC | Facility: CLINIC | Age: 20
End: 2020-11-10

## 2020-11-10 ENCOUNTER — TELEPHONE (OUTPATIENT)
Dept: PERINATAL CARE | Facility: CLINIC | Age: 20
End: 2020-11-10

## 2020-11-11 ENCOUNTER — ROUTINE PRENATAL (OUTPATIENT)
Dept: OBGYN CLINIC | Facility: CLINIC | Age: 20
End: 2020-11-11

## 2020-11-11 ENCOUNTER — ROUTINE PRENATAL (OUTPATIENT)
Dept: PERINATAL CARE | Facility: OTHER | Age: 20
End: 2020-11-11
Payer: COMMERCIAL

## 2020-11-11 VITALS
HEIGHT: 65 IN | TEMPERATURE: 98.7 F | BODY MASS INDEX: 38.29 KG/M2 | DIASTOLIC BLOOD PRESSURE: 74 MMHG | SYSTOLIC BLOOD PRESSURE: 112 MMHG | HEART RATE: 97 BPM | WEIGHT: 229.8 LBS

## 2020-11-11 VITALS
HEIGHT: 65 IN | SYSTOLIC BLOOD PRESSURE: 112 MMHG | HEART RATE: 73 BPM | DIASTOLIC BLOOD PRESSURE: 73 MMHG | TEMPERATURE: 97.1 F | BODY MASS INDEX: 38.45 KG/M2 | WEIGHT: 230.8 LBS

## 2020-11-11 DIAGNOSIS — Z3A.19 19 WEEKS GESTATION OF PREGNANCY: ICD-10-CM

## 2020-11-11 DIAGNOSIS — O99.212 OBESITY AFFECTING PREGNANCY IN SECOND TRIMESTER: Primary | ICD-10-CM

## 2020-11-11 DIAGNOSIS — Z34.92 PRENATAL CARE IN SECOND TRIMESTER: Primary | ICD-10-CM

## 2020-11-11 DIAGNOSIS — Z36.86 ENCOUNTER FOR ANTENATAL SCREENING FOR CERVICAL LENGTH: ICD-10-CM

## 2020-11-11 DIAGNOSIS — O09.892 H/O PRETERM DELIVERY, CURRENTLY PREGNANT, SECOND TRIMESTER: ICD-10-CM

## 2020-11-11 DIAGNOSIS — O34.219 MATERNAL CARE FOR SCAR FROM PREVIOUS CESAREAN DELIVERY, UNSPECIFIED SCAR TYPE: ICD-10-CM

## 2020-11-11 PROCEDURE — 76817 TRANSVAGINAL US OBSTETRIC: CPT | Performed by: OBSTETRICS & GYNECOLOGY

## 2020-11-11 PROCEDURE — 76811 OB US DETAILED SNGL FETUS: CPT | Performed by: OBSTETRICS & GYNECOLOGY

## 2020-11-11 PROCEDURE — 99213 OFFICE O/P EST LOW 20 MIN: CPT | Performed by: OBSTETRICS & GYNECOLOGY

## 2020-11-11 PROCEDURE — NC001 PR NO CHARGE: Performed by: OBSTETRICS & GYNECOLOGY

## 2020-12-09 ENCOUNTER — TELEMEDICINE (OUTPATIENT)
Dept: OBGYN CLINIC | Facility: CLINIC | Age: 20
End: 2020-12-09

## 2020-12-09 DIAGNOSIS — Z34.92 PRENATAL CARE IN SECOND TRIMESTER: Primary | ICD-10-CM

## 2020-12-09 DIAGNOSIS — Z3A.23 23 WEEKS GESTATION OF PREGNANCY: ICD-10-CM

## 2020-12-09 PROCEDURE — 99214 OFFICE O/P EST MOD 30 MIN: CPT | Performed by: NURSE PRACTITIONER

## 2020-12-22 ENCOUNTER — ROUTINE PRENATAL (OUTPATIENT)
Dept: OBGYN CLINIC | Facility: CLINIC | Age: 20
End: 2020-12-22

## 2020-12-22 VITALS
BODY MASS INDEX: 38.75 KG/M2 | WEIGHT: 232.6 LBS | HEIGHT: 65 IN | HEART RATE: 106 BPM | SYSTOLIC BLOOD PRESSURE: 115 MMHG | DIASTOLIC BLOOD PRESSURE: 78 MMHG

## 2020-12-22 DIAGNOSIS — Z34.92 PRENATAL CARE IN SECOND TRIMESTER: Primary | ICD-10-CM

## 2020-12-22 DIAGNOSIS — Z3A.25 25 WEEKS GESTATION OF PREGNANCY: ICD-10-CM

## 2020-12-22 PROBLEM — Z3A.23 23 WEEKS GESTATION OF PREGNANCY: Status: RESOLVED | Noted: 2020-10-14 | Resolved: 2020-12-22

## 2020-12-22 PROCEDURE — 99213 OFFICE O/P EST LOW 20 MIN: CPT | Performed by: OBSTETRICS & GYNECOLOGY

## 2020-12-22 PROCEDURE — 1036F TOBACCO NON-USER: CPT | Performed by: OBSTETRICS & GYNECOLOGY

## 2020-12-22 PROCEDURE — 3008F BODY MASS INDEX DOCD: CPT | Performed by: OBSTETRICS & GYNECOLOGY

## 2021-01-07 ENCOUNTER — LAB (OUTPATIENT)
Dept: LAB | Facility: HOSPITAL | Age: 21
End: 2021-01-07
Attending: OBSTETRICS & GYNECOLOGY
Payer: COMMERCIAL

## 2021-01-07 DIAGNOSIS — Z3A.25 25 WEEKS GESTATION OF PREGNANCY: ICD-10-CM

## 2021-01-07 LAB
ANISOCYTOSIS BLD QL SMEAR: PRESENT
BASOPHILS # BLD AUTO: 0 THOUSANDS/ΜL (ref 0–0.1)
BASOPHILS NFR BLD AUTO: 0 % (ref 0–1)
EOSINOPHIL # BLD AUTO: 0 THOUSAND/ΜL (ref 0–0.4)
EOSINOPHIL NFR BLD AUTO: 0 % (ref 0–6)
ERYTHROCYTE [DISTWIDTH] IN BLOOD BY AUTOMATED COUNT: 14.3 %
GLUCOSE 1H P 50 G GLC PO SERPL-MCNC: 107 MG/DL
HCT VFR BLD AUTO: 33.2 % (ref 36–46)
HGB BLD-MCNC: 10.4 G/DL (ref 12–16)
LYMPHOCYTES # BLD AUTO: 1.9 THOUSANDS/ΜL (ref 0.5–4)
LYMPHOCYTES NFR BLD AUTO: 18 % (ref 25–45)
MCH RBC QN AUTO: 24.2 PG (ref 26–34)
MCHC RBC AUTO-ENTMCNC: 31.4 G/DL (ref 31–36)
MCV RBC AUTO: 77 FL (ref 80–100)
MICROCYTES BLD QL AUTO: PRESENT
MONOCYTES # BLD AUTO: 0.7 THOUSAND/ΜL (ref 0.2–0.9)
MONOCYTES NFR BLD AUTO: 7 % (ref 1–10)
NEUTROPHILS # BLD AUTO: 7.9 THOUSANDS/ΜL (ref 1.8–7.8)
NEUTS SEG NFR BLD AUTO: 74 % (ref 45–65)
PLATELET # BLD AUTO: 382 THOUSANDS/UL (ref 150–450)
PLATELET BLD QL SMEAR: ADEQUATE
PMV BLD AUTO: 7.5 FL (ref 8.9–12.7)
RBC # BLD AUTO: 4.3 MILLION/UL (ref 4–5.2)
RBC MORPH BLD: NORMAL
WBC # BLD AUTO: 10.6 THOUSAND/UL (ref 4.5–11)

## 2021-01-07 PROCEDURE — 36415 COLL VENOUS BLD VENIPUNCTURE: CPT

## 2021-01-07 PROCEDURE — 86592 SYPHILIS TEST NON-TREP QUAL: CPT

## 2021-01-07 PROCEDURE — 85025 COMPLETE CBC W/AUTO DIFF WBC: CPT

## 2021-01-07 PROCEDURE — 82950 GLUCOSE TEST: CPT

## 2021-01-08 LAB — RPR SER QL: NORMAL

## 2021-01-12 ENCOUNTER — ROUTINE PRENATAL (OUTPATIENT)
Dept: OBGYN CLINIC | Facility: CLINIC | Age: 21
End: 2021-01-12

## 2021-01-12 VITALS
DIASTOLIC BLOOD PRESSURE: 75 MMHG | BODY MASS INDEX: 39.27 KG/M2 | SYSTOLIC BLOOD PRESSURE: 135 MMHG | WEIGHT: 236 LBS | HEART RATE: 106 BPM

## 2021-01-12 DIAGNOSIS — Z34.93 PRENATAL CARE IN THIRD TRIMESTER: Primary | ICD-10-CM

## 2021-01-12 DIAGNOSIS — O99.013 ANEMIA DURING PREGNANCY IN THIRD TRIMESTER: ICD-10-CM

## 2021-01-12 DIAGNOSIS — Z3A.28 28 WEEKS GESTATION OF PREGNANCY: ICD-10-CM

## 2021-01-12 PROCEDURE — 90471 IMMUNIZATION ADMIN: CPT

## 2021-01-12 PROCEDURE — 99215 OFFICE O/P EST HI 40 MIN: CPT | Performed by: NURSE PRACTITIONER

## 2021-01-12 PROCEDURE — 90715 TDAP VACCINE 7 YRS/> IM: CPT

## 2021-01-12 RX ORDER — DOCUSATE SODIUM 100 MG/1
100 CAPSULE, LIQUID FILLED ORAL 2 TIMES DAILY
Qty: 30 CAPSULE | Refills: 2 | Status: SHIPPED | OUTPATIENT
Start: 2021-01-12 | End: 2021-02-11

## 2021-01-12 RX ORDER — FERROUS SULFATE TAB EC 324 MG (65 MG FE EQUIVALENT) 324 (65 FE) MG
324 TABLET DELAYED RESPONSE ORAL
Qty: 60 TABLET | Refills: 3 | Status: SHIPPED | OUTPATIENT
Start: 2021-01-12

## 2021-01-12 NOTE — PROGRESS NOTES
Assessment & Plan  23 y o   at 28w4d presenting for routine prenatal visit  28 week education and TDAP provided today, all questions answered, birth plan completed  Explained decreased H&H and need for iron supplement  Safe and effective use of colace and iron provided  Discussed iron rich diet    WNL respiratory effort, negative cough or SOB    Problem List Items Addressed This Visit        Other    Prenatal care in third trimester - Primary      Other Visit Diagnoses     28 weeks gestation of pregnancy        Relevant Orders    TDAP Vaccine greater than or equal to 6yo (Completed)    Anemia during pregnancy in third trimester        Relevant Medications    ferrous sulfate 324 (65 Fe) mg    docusate sodium (COLACE) 100 mg capsule        ____________________________________________________________  Subjective  She is without complaint  She denies contractions, loss of fluid, or vaginal bleeding  She feels regular fetal movements       Objective  /75   Pulse (!) 106   Wt 107 kg (236 lb)   LMP 2020   BMI 39 27 kg/m²          Patient's Active Problem List  Patient Active Problem List   Diagnosis    Class 2 obesity without serious comorbidity with body mass index (BMI) of 38 0 to 38 9 in adult    Prenatal care in third trimester    Obesity affecting pregnancy in second trimester    H/O  delivery, currently pregnant, second trimester    Maternal care for scar from previous  delivery     Plan  Take iron as directed  Increase fiber and water  in your diet  To call with vaginal bleeding, loss of fluid, regular contractions, decreased fetal movement, other needs or concerns  Return in 2 weeks  Pt verbalized understanding of all discussed

## 2021-01-12 NOTE — PATIENT INSTRUCTIONS
The Third Trimester  (28-42 weeks)  YOUR BABY   * your baby sucks its thumb now! * your baby can hear voices and respond to touch   so talk to him or her!!   * your babys brain grows and develops most in the last 2 months of pregnancy   * babys head and bones are soft and flexible so they can fit through the birth canal   * babys movements change towards the end of pregnancy because there is less room for kicking and stretching in your belly   * babys lungs are not fully developed and completely ready to breathe on their own until the last 3-4 weeks before your due date    YOUR BODY   * your belly is growing a lot now   * it may become more difficult to sleep well at night or to be as active as you usually are   * you may sweat more than usual   * you will become more off-balancebe careful not to fall!!   * you may develop hemorrhoids (which can be painful and make it difficult to sit down)   * the last two months of pregnancy can become very uncomfortablewith backaches, headaches, and heartburn   * you can start to have contractions  as long as they are irregular and less than 5 per hour, this is a normal part of your body getting ready to have a baby   * your cervix may start to thin out and open upto get ready for delivery   * you may find yourself needing to pee very often  because baby is pressing on your bladder so much   * you may get out of breathe more quickly than usual      FETAL KICK COUNTS    In the third trimester (after 28 weeks gestation) you should be performing fetal kick counts every day  Your baby should move at least 10 times in 2 hours during an active time, once a day  Choose atime of day when your baby is most active  Try to do this around the same time each day  Get into a comfortable position and then write down the time your baby first moves  Count each movement until the baby moves 10 times  These movements include kicks, punches, nudges, flutters, or rolls    This can take anywhere from 5 minutes to 2 hours  Write down the time you feel the baby's 10th movement  If 2 hours has passed and your baby has not moved at least 10 times, you should CALL THE OFFICE RIGHT AWAY  [phone]  PREMATURE LABOR     When to call [phone]:  * I need to call immediately if I have even a small amount of LIQUID leaking from my vagina, with or without contractions  * I need to call if I am BLEEDING from my vagina  * I need to call if I am feeling CRAMPING that continues after drinking 2-3 glasses of water and lying down on my side for one hour and that feels like I am having a period  * I need to call if I feel CONTRACTIONS  more than 4 times in an hour that feels like the baby is balling up even after I try drinking 2-3 glasses of water and lying down on my side for an hour  * I need to call if I notice a change in my vaginal DISCHARGE  * I need to call if I am feeling PELVIC PRESSURE  that feels like the baby is pushing down into my vagina and lasts more than an hour  * I need to call if I have LOW BACKACHE which is new and near my tailbone  It may either come and go several times during an hour or stay there constantly  PRE-ECLAMPSIA     What is it? Pre-eclampsia is a serious disease that can occur during pregnancy related to high blood pressures  It can happen to any woman  Why should I care? Women who develop pre-eclampsia have serious risks which can include seizures, stroke, organ damage, premature birth of their baby  In the very worst cases, it can cause death of the mother and/or their baby  What should I pay attention to?    Signs and symptoms of pre-eclampsia can include:   * Severe swelling of face or hands    * A headache that will not go away even after you have taken Tylenol   * Seeing spots or changes in eyesight    * Pain in the upper abdomen or shoulder    * New nausea and vomiting (in the second half of pregnancy)    * Sudden weight gain    * Difficulty breathing     What should I do? If you experience any of the above symptoms of pre-eclampsia, contact your OB provider  Finding pre-eclampsia early is important for you and your baby  Call us at [phone]  [facility]ifuent[facility] BREASTFEEDING     BENEFITS FOR BABIES   * stronger immune systems (less allergies, eczema, asthma, and childhood cancers)   * less diarrhea and constipation or other GI diseases   * fewer colds and ear infections   * better vision and teeth (fewer cavities)   * improves IQ   * lower rates of diabetes and obesity in childhood     BENEFITS FOR MOMS   * promotes faster weight loss after delivery   * lower risk for postpartum depression   * lower risk for breast, uterine, and ovarian cancers   * lower risk for osteoporosis developing with age   * easier than formula - is always right with you, clean, and the right temperature   * less expensive than formulaits FREE !!!!     KEYS TO SUCCESSFUL BREASTFEEDING   * keep baby skin-to-skin until after first feeding event   * keep baby in your room with you during your hospital stay after delivery   * avoid any bottle feedings (unless medically necessary)   * limit the use of pacifiers and swaddling   * ask for help if you are having any issueslactation consultants (who specialize in breastfeeding) are available to help you   * a healthy diet for momeating a variety of foods and portions in moderation    THINGS YOU SHOULD KNOW ABOUT BREASTFEEDING   * most medications are considered compatible with breastfeeding by the [address] [place] [address] Academy of Pediatrics, but you should check with your health care provider or lactation consultant prior to taking a new medicationjust to be sure it is safe   * alcohol (beer, wine, liquor) can be passed from mother to baby through breast milkan occasional, social drink is deemed acceptable by the American [employer] of [place] [address]   more than that should be avoided   * breastfeeding is NOT an effective method of birth control   * nicotine (in cigarettes) can pass from mother to baby through breast milk   however, for mothers who smoke, it is still healthier to breastfeed than use formula   * caffeine should be limited to 1-3 cups per dayincludes coffee, soda, energy drinks         PERINEAL / VAGINAL MASSAGE    What can I do now to decrease my chances of tearing during delivery? Massaging around the vaginal opening by you (or your partner), either antepartum (before birth) or during the second stage of labor, can reduce the likelihood of perineal tearing during childbirth  Likewise, the use of warm packs held on the perineum during the pushing stage of labor can reduce the severity of your tear  This will happen during the pushing stage of labor  At home, you can also help reduce the chances of injury that may occur during the birth of your child through perineal massage  When should I do this? Starting around or shortly after 34 weeks of pregnancy, you or your partner should provide 5-10 minutes of vaginal massage 1-4 times per week  How? Use either almond, coconut, or olive oil and water mixture on 1 or 2 fingers (depending on comfort)  Insert finger(s) 3-5cm into the vagina  Apply sweeping downward/sideward pressure from 3 to 9 o'clock for 5-10 minutes, 1-4 times per week  WARNING SIGNS DURING PREGNANCY  Call our office at [phone] if you experience any of the followin  Vaginal bleeding  2  Sharp abdominal pain that does not go away  3  Fever (more than 100 4 and is not relieved by Tylenol)  4  Persistent vomiting lasting greater than 24 hours  5  Chest pain   6  Pain or burning when you urinate  7  Severe headache that doesn't resolve with Tylenol  8  Blurred vision or seeing spots in your vision  9  Sudden swelling of your face or hands  10  Redness, swelling or pain in a leg  11  A sudden weight gain in just a few days  12   Decrease in your baby's movement (after 28 weeks or the 6th month of pregnancy)  13  A loss of watery fluid from your vagina - can be a gush, a trickle or continuous wetness  14  After 20 weeks of pregnancy, rhythmic cramping (greater than 4 per hour) or menstrual like low/pelvic pain          VACCINES IN PREGNANCY    TDAP  Whopping cough (or pertusSsis) can be serious for anyone, but for your , it can be life-threatning  Up to 20 babies die each year in the [place]  Due to whopping cough  About half of babies younger than 3year old who get whopping cough need treatment in the hospital   The younger the baby is when he or she gets whopping cough, the more likely he or she will need to be treated in a hospital   When you receive the whopping cough vaccine (Tdap) during your pregnancy, your body will create protective antibodies and pass some of them to your baby before birth  These antibodies can help protect your baby from getting whopping cough until they are old enough to be vaccinated themselves (usually around 7 months of age)  INFLUENZA  Changes in your immune, heart, and lung functions during pregnancy make you more likely to get seriously ill from the flu  Catching the flu also increases your chances for serious problems for your developing baby, including premature labor and delivery  It is recommended that all women who are pregnant during flu season should receive an influenza vaccine  Coronavirus (XRLZH-03) pregnancy FAQs: Medical experts answer your questions  From [employer] cy  com/0_coronavirus-covid-19-pregnancy-faq-medical-xmmwxup-ljvskm-mv_39741350  bc (courtesy of [facility])  As of 3/18/20  By Rhonda Payne 37  Medically reviewed by Society for Maternal-Fetal Medicine       We asked parents-to-be to send us their most pressing questions about coronavirus (COVID-19)  Among them: Is it still safe to deliver in a hospital? What if my ob-gyn has the virus?  We sent those questions to the top docs at the Society for Maternal-Fetal Medicine  Here are their answers  The coronavirus (COVID-19) pandemic has been declared a national emergency in the [facility] by [employer]  Moms-to-be like you are concerned about everything from getting medicines to managing disruptions at work  But above and beyond any worry about lifestyle changes is a focus on your health and the impact of COVID-19 on your pregnancy  We asked obstetrics doctors who handle the most complicated pregnancy issues to answer your questions about the coronavirus  Here are their responses, provided by Dr Bakari Mensah and her colleagues at the Society for [place] [address]  Am I at more risk for COVID-19 if I'm pregnant? We don't know  Pregnancy does change your immune system in ways that might make you more susceptible to viral respiratory infections like COVID-19  If you become infected, you might also be at higher risk for more severe illness compared to the general population  Although this does not appear to be the case with COVID-19, based on limited data so far, a higher risk has been true for pregnant women with other coronavirus infections (SARS-CoV and MERS-CoV) and other viral respiratory infections, such as flu  It's important to take preventive actions to avoid infection, such as washing your hands often and avoiding people who are sick  How might coronavirus affect my pregnancy? Again, we don't know  Women with other coronavirus infections (such as SARS-CoV) did not have miscarriage or stillbirth at higher rates than the general population  We know that having other respiratory viral infections during pregnancy, such as flu, has been associated with problems like low birth weight and  birth  Also, having a high fever early in pregnancy may increase the risk of certain birth defects  Could I transmit coronavirus to my baby during pregnancy or delivery?   We don't know whether you could transmit COVID-19 to your baby before or during delivery  However, among the few case studies of infants born to mothers with COVID-23 published in peer-reviewed literature, none of the infants tested positive for the virus  Also, there was no virus detected in samples of amniotic fluid or breast milk  There have been a few reports of newborns as young as a few days old with infection, suggesting that a mother can transmit the infection to her infant through close contact after delivery  There have been no reports of mother-to-baby transmission for other coronaviruses (MERS-CoV and SARS-CoV), although only limited information is available  Is it safe for me to deliver at a hospital where there have been COVID-19 cases? Yes  We know that COVID-19 is a very scary virus  The good news is that hospitals are taking great precautions to keep patients and healthcare providers safe  When a patient is even suspected to have COVID-19, they are placed in a negative pressure room  (Think of these rooms as vacuums that suck and filter the air so it's safe for the other people in the hospital)  This makes it possible for you to deliver at the hospital without putting you or your baby at risk  Hospitals are also implementing stricter visiting policies to keep patients safe  It's worth calling your hospital to check if there are any new regulations to be aware of  What plans should I make now in case the hospital system is overwhelmed when it's time for me to deliver? This is a great question to talk with your doctor or midwife about when you're 34 to 36 weeks pregnant  Every hospital is making different plans for dealing with this scenario  I work in healthcare  Should I ask my doctor to excuse me from work until the baby is born? What if I work in a school, the travel [address], or some other high-risk setting?   Healthcare facilities should take care to limit the exposure of pregnant employees to patients with confirmed or suspected COVID-19, just as they would with other infectious cases  If you continue working, be sure to follow the CDC's risk assessment and infection control guidelines  If you work in a school, travel [employer], or other high-risk setting, talk with your employer about what it's doing to protect employees and minimize infection risks  Wash your hands often  What if my OB gets COVID-19? If your doctor or midwife tests positive for COVID-19, they will need to be quarantined until they recover and are no longer at risk of transmitting the virus  In this case, you'll be assigned to another OB in your doctor's practice (or you may choose another practitioner yourself)  Ask your new OB or your doctor's office if you should self-quarantine or be tested for the virus  (It will depend on when you last saw your provider and when that person tested positive )    Should we hold off on trying to conceive because of COVID-19? At this time, there's no reason to hold off on trying to get pregnant, but the data we have is really limited  For example, we don't think the virus causes birth defects or increases your risk of miscarriage  But we don't know whether you could transmit COVID-19 to your baby before or during delivery  We also don't know if the virus lives in semen or can be sexually transmitted  We have a babymoon scheduled in the next few months - should we cancel? If you're planning to travel internationally or on a cruise, you should strongly consider canceling  At this time, the virus has reached more than 140 countries, and there are travel bans to [place], most of [place], and [place]  Places where large numbers of people gather are at highest risk, especially airports and cruise ships  If you're planning travel in the [place] , note that any travel setting increases your risk of exposure, and there may be travel bans even in [place] by the time you're scheduled to go   Even if you're state is not blocked, you'll definitely want to avoid traveling to communities where the virus is spreading  To find out where these places are, check The [place] Times map based on CDC data  For the most current advice to help you avoid exposure, check the CDC's COVID-19 travel page  Will the hospital separate me from my  and keep the baby in quarantine? If you test positive for COVID-19 or have been exposed but have no symptoms, the CDC, [employer] of Obstetricians and Gynecologists, and the Society for [employer] all recommend that you be  from your baby to decrease the risk of transmission to the baby  This would last until you are no longer at risk of transmitting the virus  If you do not have COVID-19 and have not been exposed to the virus, the hospital will not separate you from your   My hospital is restricting visitors and only allowing one support person  If my support person leaves after the delivery, will they be allowed to come back? Every hospital has different policies  Contact your hospital or labor and delivery unit a week or so before delivery to get the most up-to-date restrictions  In general, if your support person needs to leave, they would be allowed back unless they knew they were exposed to COVID-19 after leaving your company  [facility] understands that the coronavirus (COVID-19) pandemic is an evolving story and that your questions will change over time  We'll continue asking moms and dads in our Community what they want to know, and we'll get the answers from experts to keep them - and you - informed and supported  My mom was planning to fly here to help me care for my new baby after delivery  Should I tell her not to come? Yes  If your mom is over 61 or has any serious chronic medical conditions (such as heart disease, lung disease, or diabetes), she is at higher risk of serious illness from COVID-19 and should avoid air travel   And remember that any travel setting increases a person's risk of exposure  So, it may be risky to have her around the baby after she has been traveling  For the most current advice on traveling, check the CDC's COVID-19 travel page  [facility] understands that the coronavirus pandemic is an evolving story and that your questions will change over time  We'll continue asking moms and dads in our Community what they want to know, and we'll get the answers from experts to keep them - and you - informed and supported      Take iron as directed  Take colace as needed  Increase water and fiber in your diet  Return in 2 weeks

## 2021-01-21 ENCOUNTER — ROUTINE PRENATAL (OUTPATIENT)
Dept: OBGYN CLINIC | Facility: CLINIC | Age: 21
End: 2021-01-21

## 2021-01-21 VITALS
WEIGHT: 240 LBS | BODY MASS INDEX: 39.94 KG/M2 | HEART RATE: 87 BPM | DIASTOLIC BLOOD PRESSURE: 64 MMHG | SYSTOLIC BLOOD PRESSURE: 106 MMHG

## 2021-01-21 DIAGNOSIS — R30.0 DYSURIA: Primary | ICD-10-CM

## 2021-01-21 DIAGNOSIS — B37.3 VAGINAL YEAST INFECTION: ICD-10-CM

## 2021-01-21 PROCEDURE — 99213 OFFICE O/P EST LOW 20 MIN: CPT | Performed by: OBSTETRICS & GYNECOLOGY

## 2021-01-21 PROCEDURE — 1036F TOBACCO NON-USER: CPT | Performed by: OBSTETRICS & GYNECOLOGY

## 2021-01-21 NOTE — PROGRESS NOTES
OB/GYN  PN Visit  Carmenza Krishna  [id number]  1/21/2021  1:58 PM  Dr María Rojo MD    S: 23 y o  [id number] 29w6d here for problem visit  OB complaints:  Ctxns: rarely  Leakage: no  Bleeding: no  Fetal movement: yes    She reports that she is having vaginal irritation  She reports that the irritation/itching is on her labia and is worse when she urinates  She denies dysuria or vaginal discharge  She has never experience this before  She has no fevers/chills       O:  Vitals:    01/21/21 1332   BP: 106/64   Pulse: 87       Gen: no acute distress, nonlabored breathing     SSE: labia slightly erythematous, multiparous cervix, thick white discharge noted     KOH: positive for pseudohyphae  Wet prep: negative    A/P:    Problem List Items Addressed This Visit     None      Visit Diagnoses     Dysuria    -  Primary    Relevant Orders    NTX Panel, Urine    Vaginal yeast infection        Relevant Medications    miconazole (MONISTAT-7) 2 % vaginal cream        RTC in 1 week for routine prenatal appointment      María Rojo MD  1/21/2021  1:58 PM

## 2021-01-27 ENCOUNTER — ROUTINE PRENATAL (OUTPATIENT)
Dept: OBGYN CLINIC | Facility: CLINIC | Age: 21
End: 2021-01-27

## 2021-01-27 VITALS
DIASTOLIC BLOOD PRESSURE: 76 MMHG | WEIGHT: 238 LBS | SYSTOLIC BLOOD PRESSURE: 117 MMHG | BODY MASS INDEX: 39.61 KG/M2 | HEART RATE: 103 BPM

## 2021-01-27 DIAGNOSIS — K21.9 GASTROESOPHAGEAL REFLUX DISEASE WITHOUT ESOPHAGITIS: ICD-10-CM

## 2021-01-27 DIAGNOSIS — Z34.93 PRENATAL CARE IN THIRD TRIMESTER: Primary | ICD-10-CM

## 2021-01-27 DIAGNOSIS — Z3A.30 30 WEEKS GESTATION OF PREGNANCY: ICD-10-CM

## 2021-01-27 PROCEDURE — 99213 OFFICE O/P EST LOW 20 MIN: CPT | Performed by: OBSTETRICS & GYNECOLOGY

## 2021-01-27 RX ORDER — CALCIUM CARBONATE 200(500)MG
2 TABLET,CHEWABLE ORAL 3 TIMES DAILY
Qty: 60 TABLET | Refills: 3 | Status: SHIPPED | OUTPATIENT
Start: 2021-01-27

## 2021-01-27 NOTE — PROGRESS NOTES
Celia Solis presents today for routine OB visit at 30w5d  Blood Pressure: 117/76  Hd=768 kg (238 lb); Body mass index is 39 61 kg/m² ; TWG=Not found  Fetal Heart Rate: 150; Fundal Height (cm): 31 cm  Abdomen: gravid, soft, non-tender  She reports acid reflux-Rx tums  Denies uterine contractions  Denies vaginal bleeding or leaking of fluid  Reports adequate fetal movement of at least 10 movements in 2 hours once daily  Scheduled for ultrasound 02/03/21  Reviewed premature labor precautions and fetal kick counts  Advised to continue medications and return in 2 weeks        Current Outpatient Medications   Medication Instructions    aspirin (ECOTRIN LOW STRENGTH) 162 mg, Oral, Daily    calcium carbonate (TUMS) 1,000 mg, Oral, 3 times daily    docusate sodium (COLACE) 100 mg, Oral, 2 times daily    doxylamine (UNISON) 12 5 mg, Oral, Daily at bedtime PRN    ferrous sulfate 324 mg, Oral, 2 times daily before meals    miconazole (MONISTAT-7) 2 % vaginal cream 1 applicator, Vaginal, Daily at bedtime    Prenatal Vit-Fe Fumarate-FA (Prenatal/Folic Acid) TABS 1 tablet, Oral, Daily    vitamin B-6 25 mg, Oral, 3 times daily         Pregnancy Problems (from 09/09/20 to present)     Problem Noted Resolved    23 weeks gestation of pregnancy 10/14/2020 by SILVIA Rushing 12/22/2020 by Nikki Felton MD    11 weeks gestation of pregnancy 8/25/2020 by Silvino Hernandez MD 10/14/2020 by SILVIA Rushing    Overview Signed 9/16/2020  2:00 PM by Wilma Mcdaniels DO     Prenatal labs wnl  Interested in sequential screen

## 2021-02-03 ENCOUNTER — ULTRASOUND (OUTPATIENT)
Dept: PERINATAL CARE | Facility: OTHER | Age: 21
End: 2021-02-03
Payer: COMMERCIAL

## 2021-02-03 VITALS
BODY MASS INDEX: 40.14 KG/M2 | WEIGHT: 241.2 LBS | SYSTOLIC BLOOD PRESSURE: 111 MMHG | HEART RATE: 94 BPM | DIASTOLIC BLOOD PRESSURE: 69 MMHG

## 2021-02-03 DIAGNOSIS — O34.219 MATERNAL CARE FOR SCAR FROM PREVIOUS CESAREAN DELIVERY, UNSPECIFIED SCAR TYPE: ICD-10-CM

## 2021-02-03 DIAGNOSIS — O09.893 H/O PRETERM DELIVERY, CURRENTLY PREGNANT, THIRD TRIMESTER: ICD-10-CM

## 2021-02-03 DIAGNOSIS — O99.213 OBESITY AFFECTING PREGNANCY IN THIRD TRIMESTER: Primary | ICD-10-CM

## 2021-02-03 DIAGNOSIS — Z3A.31 31 WEEKS GESTATION OF PREGNANCY: ICD-10-CM

## 2021-02-03 PROCEDURE — 76816 OB US FOLLOW-UP PER FETUS: CPT | Performed by: OBSTETRICS & GYNECOLOGY

## 2021-02-03 PROCEDURE — 99213 OFFICE O/P EST LOW 20 MIN: CPT | Performed by: OBSTETRICS & GYNECOLOGY

## 2021-02-03 NOTE — LETTER
2021     Rock Suellen MD  [address]    Patient: Lay Zuniga   YOB: 2000   Date of Visit: 2/3/2021       Dear Dr Cesar Jackson: Thank you for referring Gus Jurado to me for evaluation  Below are my notes for this consultation  If you have questions, please do not hesitate to call me  I look forward to following your patient along with you  Sincerely,        Dafne Bolaños MD        CC: No Recipients  Dafne Bolaños MD  2021 11:30 PM  Sign when Signing Visit     Gus Jurado has no complaints today  She reports regular fetal movements and does not report any problems related to hypertension, gestational diabetes,  labor, or vaginal bleeding  Her prenatal course has apparently been unremarkable in the interim since her most recent visit here  I used the  [id number] during todays appointment  Problem list:   1  Prior  birth at 26 weeks by  section after  premature rupture membranes  The her baby had a  demise at 10 months secondary to aspiration  This pregnancy was followed by a term pregnancy without the use of Marceline  2  History of 2 prior C-sections   3  Increased BMI   4  She has not completed any genetic screening   5  Her Glucola screen was normal   Hemoglobin was 10 4 and she was started on iron by her ob office  Ultrasound findings: The ultrasound today shows normal interval fetal growth and fluid  Pregnancy ultrasound has limitations and is unable to detect all forms of fetal congenital abnormalities  The inaccuracy in the EFW can be off by 1 lb either way in the third trimester  Specific counseling was provided on the following problems:  1  Reviewed how to take her iron in depth for best absorption      Follow up recommended:   Tests ordered today: none    Tests recommended through her ob office in the future: Recommend weekly NST/ fluid scans from 36 weeks on secondary to her BMI of 40    Future ultrasounds ordered today: No further ultrasounds are recommended at this time  Pre visit time reviewing her records   10 minutes  Face to face time 10 minutes  Post visit time on documentation of note, updating her problem list, adding orders and prescriptions 5 minutes  Procedures that were completed today were charged separately     The level of decision making was low level complexity    Rod Cuello MD

## 2021-02-03 NOTE — PROGRESS NOTES
Gus Jurado has no complaints today  She reports regular fetal movements and does not report any problems related to hypertension, gestational diabetes,  labor, or vaginal bleeding  Her prenatal course has apparently been unremarkable in the interim since her most recent visit here  I used the  [id number] during todays appointment  Problem list:   1  Prior  birth at 26 weeks by  section after  premature rupture membranes  The her baby had a  demise at 10 months secondary to aspiration  This pregnancy was followed by a term pregnancy without the use of Glenna  2  History of 2 prior C-sections   3  Increased BMI   4  She has not completed any genetic screening   5  Her Glucola screen was normal   Hemoglobin was 10 4 and she was started on iron by her ob office  Ultrasound findings: The ultrasound today shows normal interval fetal growth and fluid  Pregnancy ultrasound has limitations and is unable to detect all forms of fetal congenital abnormalities  The inaccuracy in the EFW can be off by 1 lb either way in the third trimester  Specific counseling was provided on the following problems:  1  Reviewed how to take her iron in depth for best absorption  Follow up recommended:   Tests ordered today: none    Tests recommended through her ob office in the future: Recommend weekly NST/ fluid scans from 36 weeks on secondary to her BMI of 40    Future ultrasounds ordered today: No further ultrasounds are recommended at this time  Pre visit time reviewing her records   10 minutes  Face to face time 10 minutes  Post visit time on documentation of note, updating her problem list, adding orders and prescriptions 5 minutes  Procedures that were completed today were charged separately     The level of decision making was low level complexity    Eris Kamara MD

## 2021-02-04 PROBLEM — O99.213 OBESITY AFFECTING PREGNANCY IN THIRD TRIMESTER: Status: ACTIVE | Noted: 2020-11-11

## 2021-02-04 PROBLEM — O09.893 H/O PRETERM DELIVERY, CURRENTLY PREGNANT, THIRD TRIMESTER: Status: ACTIVE | Noted: 2020-11-11

## 2021-02-11 ENCOUNTER — ROUTINE PRENATAL (OUTPATIENT)
Dept: OBGYN CLINIC | Facility: CLINIC | Age: 21
End: 2021-02-11

## 2021-02-11 VITALS
HEART RATE: 101 BPM | BODY MASS INDEX: 40.19 KG/M2 | DIASTOLIC BLOOD PRESSURE: 73 MMHG | SYSTOLIC BLOOD PRESSURE: 112 MMHG | HEIGHT: 65 IN | WEIGHT: 241.2 LBS

## 2021-02-11 DIAGNOSIS — Z3A.32 32 WEEKS GESTATION OF PREGNANCY: ICD-10-CM

## 2021-02-11 DIAGNOSIS — Z34.93 PRENATAL CARE IN THIRD TRIMESTER: Primary | ICD-10-CM

## 2021-02-11 PROCEDURE — 99213 OFFICE O/P EST LOW 20 MIN: CPT | Performed by: NURSE PRACTITIONER

## 2021-02-11 NOTE — PATIENT INSTRUCTIONS
El Tercer Trimestre  (28-42 semanas)   CUMMINGS BEBÉ   ·        cummings bebé chupa cummings dedo ahora! ·        cummings bebé puede oír voces y responder al tacto    habla con Rima Terrance!!   ·        el cerebro de cummings bebé crece y se desarrolla más en los últimos 2 meses de embarazo   ·        Darren Cast y [place] del bebé son Blondie Kallman y flexibles para que quepan por el canal del parto   ·        los movimientos del bebé cambian hacia el final del embarazo porque hay menos espacio para patear y estiramientos en tu vientre   ·        los pulmones del bebé no están completamente desarrollados y totalmente preparados para respirar por amanda propios hasta el último 3-4 semanas antes de cummings fecha de vencimiento     TU CUERPO   ·        cummings vientre está creciendo mucho ahora   ·        será más difícil dormir zayda de noche o ser tan activos romario eres generalmente   ·        puede sudar más de lo habitual   ·        serás más desequilibrado    tenga cuidado de no caer! ·        Usted puede desarrollar hemorroides (qué pueden ser dolorosas y hacen difícil sentarse)   ·        los dos últimos meses del embarazo puede ser muy incómodos con aubrey de Ray, aubrey de Darren Cast y ardor de estómago   ·        Puedes empezar a tener contracciones    mientras son irregulares y Calli de 5 por hora, esto es naif parte normal de cummings cuerpo a punto de tener un bebé   ·        el amy uterino puede empezar a dilatar y abrir [place]    para estar listo para el parto   ·        Usted puede encontrarse que necesitan hacer orinar muy a menudo    porque el bebé está presionando mucho la vejiga   ·        Usted puede quedarse corta de respiracion más rápido de lo karen          CUENTAS DEL RETROCESO FETAL    En el tercer trimestre (después de 28 semanas de gestación) deben realizar patada fetal cuentas cada día  Cummings bebé debe moverse al menos 10 veces en 2 horas omid un tiempo [place], naif vez al día  Elegir el atime del día cuando el bebé es [place]   Trate de hacerlo a la misma hora cada día  Entrar en naif posición cómoda y luego escribir el tiempo que tu bebé se mueve michaela  Cuenta cada movimiento hasta que el bebé se mueve 10 veces  Estos movimientos incluyen patadas, puñetazos, codazos, revolotea o rollos  [place] puede tardar entre 5 minutos a 2 horas  Anote el tiempo que sientes movimiento 10 del bebé  Si ha pasado 2 horas y tu bebé no se ha movido al menos 10 veces, usted debe llamar a la oficina de inmediato  Khadijah Tonia Germain Kes[address] a [phone]:  Viv [place]ges que llamar inmediatamente si tengo incluso naif pequeña cantidad de LIQUIDO de mi vagina, con o sin contracciones  * Tengo que llamar si estoy SANGRADO de mi vagina  * Tengo que llamar si tengo COLICOS que continúa después de beber 2 ó 3 vasos de agua y [place] en mi lado omid naif hora y que se siente romario que estoy teniendo un período  * Tengo que llamar si siento CONTRACCIONES más de 4 veces en naif hora quando siento que mi rashid se mantiene dura después de que trato de beber 2-3 vasos del agua y [place] en mi lado omid naif hora  * Tengo que llamar si janene un cambio de mi FLUJO vaginal    * Tengo que llamar si siento la PRESIÓN PÉLVICA que siente que el bebé aprieta en mi vagina y dura más de [place]  * Tengo que llamar si tengo DOLOR BAJO DE LA ESPALDA que es nueva y está cerca de mi cóccix  Puede entrar y salir varias veces omid naif hora o quedarse allí constantemente  LA PRE-ECLAMPSIA    ¿Qué es? La preeclampsia es naif enfermedad grave que puede ocurrir omid el embarazo se relaciona con la presión arterial nahum  Le puede pasar a cualquier mc  ¿Por qué Yes importarme? [address] preeclampsia tienen riesgos graves pueden incluir convulsiones, trazo, daños en los [place], nacimiento prematuro de cummings bebé  En los [facility], puede causar la muerte de la madre y cummings bebé  ¿Qué celestine pagar Santana Shafer?  Signos y síntomas de la preeclampsia pueden incluir:   * Inflamación severa beth de la breana o las   * Dolor de maría todavía presente después de jenifer tomado Tylenol   * Ashu manchas o cambios en [place]   * Dolor en la parte superior del abdomen o hombro   * [place] náuseas y vómitos (en la segunda mitad del embarazo)   * Aumento de peso repentino   * Dificultad para respirar     ¿Qué celestine hacer? Si usted experimenta alguno de los síntomas de la preeclampsia, comuníquese con cummings proveedor de [place]  Encontrar la preeclampsia temprana es importante para usted y cummings bebé  Corazon Jimenez al [phone]  LACTANCIA MATERNA     BENEFICIOS PARA LOS BEBÉS   ·       sistemas inmunológicos más zane (menos alergias, eczema, asma y cáncer infantil)   ·       menos diarrea y estreñimiento u otras enfermedades GI   ·       menos resfriados e infecciones del oído   ·       mejor visión y dientes (menos cavidades)   ·       mejora el IQ   ·       menores tasas de diabetes y obesidad en la infancia     BENEFICIOS PARA LAS MADRES   ·        promueve la pérdida de peso más rápida después del parto   ·        sylvie riesgo para la depresión postparto   ·        sylvie riesgo para los cánceres Pia, útero y ovario   ·        sylvie riesgo para la osteoporosis en desarrollo con la edad   ·        siempre es más fácil que fórmula - correcto, limpio, y la temperatura adecuada   ·        menos costosa que la fórmula es gratis!!!     CLAVES PARA JANINE LACTANCIA EXITOSA   ·        mantener bebé piel a piel hasta después de la primera alimentación evento   ·        tener a bebé en cummings cuarto con usted omid cummings estadía en el hospital después del parto   ·        evitar cualquier botella de alimentación (a menos que sea médicamente necesario)   ·        limitar el uso de chupones y pañales   ·        Pida ayuda si usted está teniendo problemas    consultores de lactancia (que se especializan en la lactancia) están disponibles para ayudarle a   · naif dieta saludable para mamá    comiendo naif variedad de alimentos y raciones con moderación     COSAS QUE DEBES SABER SOBRE LA LACTANCIA   ·        mayoría de los medicamentos se considera compatible con la lactancia materna por [facility] consultarlo con cummings médico o en lactancia antes de rubina un medicamento nuevo    para estar seguro es seguro   ·        alcohol (cerveza, vino, licor) puede transmitirse de la madre al bebé a través de la [place]    un ocasional, social bebida es considerado aceptable por [address]    más que eso deben evitarse   ·        la lactancia materna es NO es un método para evitar embarazo   ·        nicotina (cigarrillos) puede pasar de la madre al bebé a través de la [place]    sin embargo, para las [employer], es aún más saludable para amamantar que use la fórmula   ·        cafeína debería limitarse a 1-3 tazas por día    incluye café, refrescos, bebidas energéticas           MASAJE EN LA CHAGO PERINEAL O VAGINAL    Que puedo hacer ahora para reducir las probabilidades de desgarro omid el parto? Masaje alrededor del orificio de la vagina realizado por usted misma (o por cummings paresh)  El masaje en esta chago, ya sea antes del parto o omid la segunda etapa del parto, [place] reducir la probabilidad de desgarro perineal omid el parto  Asimismo, el uso de compresas tibias en el perineo omid la etapa expulsiva del parto puede reducir la pravedad del desparro  Bodfish sucedera omid la etapa expulsiva del parto  En casa tambien puede reducir las probabilidades de sufrir lesiones durnate el parto de con masajes en la chago perineal     Thang Felix? Todas las mujeres embarazadas a partir de, [place], las 34 semanas de [facility]  Cuando? Usted o cummings paresh deben hacer masajes en la chago vaginal omid 5 a 10 minutos de 1 a 4 veces por semana  Rico?   Use [employer] de agyoana y [facility], gavin u corey con 1 o 2 dedos (kun la comodidad)  Inserte los dedos en la vagina entre 3 y 5cm  Aplique presion general hacia abajo y [employer] costados jv 5 a [phone] 3 y las 9 horas, de 1 a 4 veces por semana  SENALES JV EL EMBARAZO  Llame a nuestra oficina al [phone] para cualquiera de los siguientes:    1  Sangrado vaginal  2  Dolor abdominal amanda que no desaparece  3  Fiebre (más de 100 4 y no se dom con Tylenol)  4  Vómitos persistentes que torres más de 24 horas  5  dolor de pecho  6  Dolor o ardor al orinar  7  Dolor de maría severo que no se resuelve con Tylenol  8  Visión borrosa o hillary puntos en cummings visión  9  Hinchazón repentina de cummings breana o beth  10  Enrojecimiento, hinchazón o dolor en naif pierna  11  Un aumento de peso repentino en pocos días  12  Contar los movimientos fetales del bebé  (después de 28 semanas o el sexto mes de embarazo)  15  Naif pérdida de líquido acuoso de la vagina: puede ser un chorro, un goteo o naif humedad continua  14  Después de 20 semanas de embarazo, calambres rítmicos (más de 4 por hora) o menstruales romario dolor bajo / Ritter Rafa JV EL EMBARAZO    TDAP  La tos ferina (o tos [place]) puede ser grave para cualquier persona, saskia para cummings recién nacido, puede ser mortal  Hasta 20 recién nacidos mueren cada año en los [place] debido a la tos [place]  Aproximadamente la mitad de los bebés menores de 1 año de edad que contraen tos ferina necesitan tratamiento en el hospital  Cuanto más joven es el bebé cuando él o carol son la tos Lajean Falter probable es que él o carol tendrá que ser tratado en un hospital  Cuando usted recibe la vacuna contra la tos ferina (Tdap) jv cummings embarazo, cummings cuerpo creará anticuerpos protectores y algunos de ellos pasan a cummings bebé antes de nacer   Estos anticuerpos pueden ayudar a proteger a cummings bebé contraigan la tos ferina hasta que tienen edad suficiente para recibir Shaneka English ellos mismos (generalmente alrededor de 6 meses de edad)  INFLUENZA  Cambios en las funciones inmunológica, del corazón y pulmón omid el embarazo te hacen más susceptible a padecer seriamente enfermo de la gripe  Coger la gripe también aumenta las posibilidades de problemas graves para cummings bebé en desarrollo, incluyendo la entrega y el trabajo de [facility]  Se recomienda que todas las mujeres que están embarazadas omid la temporada de gripe deben recibir naif vacuna contra la influenza

## 2021-02-11 NOTE — PROGRESS NOTES
Daron Barney presents today for routine OB visit at [address] [place] [address]  Blood Pressure: 112/73  Lz=121 kg (241 lb 3 2 oz); Body mass index is 40 14 kg/m²  Fetal Heart Rate: 148; Fundal Height (cm): 34 cm  Abdomen: gravid, soft, non-tender  She reports n/v well-controlled with [employer] and vitamin B6  Reports occasional irregular uterine contractions - especially at night  Denies vaginal bleeding or leaking of fluid  Reports adequate fetal movement of at least 10 movements in 2 hours once daily  Reviewed premature labor precautions and fetal kick counts  Advised to continue medications and return in 2 weeks        Current Outpatient Medications   Medication Instructions    aspirin (ECOTRIN LOW STRENGTH) 162 mg, Oral, Daily    calcium carbonate (TUMS) 1,000 mg, Oral, 3 times daily    doxylamine (UNISON) 12 5 mg, Oral, Daily at bedtime PRN    ferrous sulfate 324 mg, Oral, 2 times daily before meals    Prenatal Vit-Fe Fumarate-FA (Prenatal/Folic Acid) TABS 1 tablet, Oral, Daily    vitamin B-6 25 mg, Oral, 3 times daily

## 2021-02-22 ENCOUNTER — HOSPITAL ENCOUNTER (INPATIENT)
Facility: HOSPITAL | Age: 21
LOS: 2 days | Discharge: HOME | DRG: 566 | End: 2021-02-24
Attending: EMERGENCY MEDICINE | Admitting: OBSTETRICS & GYNECOLOGY
Payer: COMMERCIAL

## 2021-02-22 ENCOUNTER — TELEPHONE (OUTPATIENT)
Dept: OBGYN CLINIC | Facility: CLINIC | Age: 21
End: 2021-02-22

## 2021-02-22 DIAGNOSIS — R10.2 PELVIC PAIN: ICD-10-CM
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
## 2021-02-22 DIAGNOSIS — M54.9 BACK PAIN: ICD-10-CM

## 2021-02-22 LAB
ABO GROUP BLD: NORMAL
AMPHETAMINES SERPL QL SCN: NEGATIVE
BACTERIA UR QL AUTO: ABNORMAL /HPF
BARBITURATES UR QL: NEGATIVE
BENZODIAZ UR QL: NEGATIVE
BILIRUB UR QL STRIP: NEGATIVE
BLD GP AB SCN SERPL QL: NEGATIVE
CLARITY UR: ABNORMAL
COCAINE UR QL: NEGATIVE
COLOR UR: YELLOW
ERYTHROCYTE [DISTWIDTH] IN BLOOD BY AUTOMATED COUNT: 14.9 % (ref 11.6–15.1)
GLUCOSE UR STRIP-MCNC: NEGATIVE MG/DL
HCT VFR BLD AUTO: 35.6 % (ref 34.8–46.1)
HGB BLD-MCNC: 11 G/DL (ref 11.5–15.4)
HGB UR QL STRIP.AUTO: NEGATIVE
KETONES UR STRIP-MCNC: ABNORMAL MG/DL
LEUKOCYTE ESTERASE UR QL STRIP: NEGATIVE
MCH RBC QN AUTO: 24.3 PG (ref 26.8–34.3)
MCHC RBC AUTO-ENTMCNC: 30.9 G/DL (ref 31.4–37.4)
MCV RBC AUTO: 79 FL (ref 82–98)
METHADONE UR QL: NEGATIVE
NITRITE UR QL STRIP: NEGATIVE
NON-SQ EPI CELLS URNS QL MICRO: ABNORMAL /HPF
OPIATES UR QL SCN: NEGATIVE
OXYCODONE+OXYMORPHONE UR QL SCN: NEGATIVE
PCP UR QL: NEGATIVE
PH UR STRIP.AUTO: 6 [PH]
PLATELET # BLD AUTO: 367 THOUSANDS/UL (ref 149–390)
PMV BLD AUTO: 9.7 FL (ref 8.9–12.7)
PROT UR STRIP-MCNC: ABNORMAL MG/DL
RBC # BLD AUTO: 4.52 MILLION/UL (ref 3.81–5.12)
RBC #/AREA URNS AUTO: ABNORMAL /HPF
RH BLD: POSITIVE
SP GR UR STRIP.AUTO: >=1.03 (ref 1–1.03)
SPECIMEN EXPIRATION DATE: NORMAL
THC UR QL: NEGATIVE
UROBILINOGEN UR QL STRIP.AUTO: 0.2 E.U./DL
WBC # BLD AUTO: 13.42 THOUSAND/UL (ref 4.31–10.16)
WBC #/AREA URNS AUTO: ABNORMAL /HPF

## 2021-02-22 PROCEDURE — 99282 EMERGENCY DEPT VISIT SF MDM: CPT | Performed by: PHYSICIAN ASSISTANT

## 2021-02-22 PROCEDURE — 99202 OFFICE O/P NEW SF 15 MIN: CPT

## 2021-02-22 PROCEDURE — 85027 COMPLETE CBC AUTOMATED: CPT | Performed by: OBSTETRICS & GYNECOLOGY

## 2021-02-22 PROCEDURE — 86901 BLOOD TYPING SEROLOGIC RH(D): CPT | Performed by: OBSTETRICS & GYNECOLOGY

## 2021-02-22 PROCEDURE — 86900 BLOOD TYPING SEROLOGIC ABO: CPT | Performed by: OBSTETRICS & GYNECOLOGY

## 2021-02-22 PROCEDURE — 81001 URINALYSIS AUTO W/SCOPE: CPT | Performed by: OBSTETRICS & GYNECOLOGY

## 2021-02-22 PROCEDURE — 99221 1ST HOSP IP/OBS SF/LOW 40: CPT | Performed by: OBSTETRICS & GYNECOLOGY

## 2021-02-22 PROCEDURE — 99284 EMERGENCY DEPT VISIT MOD MDM: CPT

## 2021-02-22 PROCEDURE — 4A1HXCZ MONITORING OF PRODUCTS OF CONCEPTION, CARDIAC RATE, EXTERNAL APPROACH: ICD-10-PCS | Performed by: OBSTETRICS & GYNECOLOGY

## 2021-02-22 PROCEDURE — 86850 RBC ANTIBODY SCREEN: CPT | Performed by: OBSTETRICS & GYNECOLOGY

## 2021-02-22 PROCEDURE — NC001 PR NO CHARGE: Performed by: OBSTETRICS & GYNECOLOGY

## 2021-02-22 PROCEDURE — 87150 DNA/RNA AMPLIFIED PROBE: CPT | Performed by: OBSTETRICS & GYNECOLOGY

## 2021-02-22 PROCEDURE — 86592 SYPHILIS TEST NON-TREP QUAL: CPT | Performed by: OBSTETRICS & GYNECOLOGY

## 2021-02-22 PROCEDURE — 80307 DRUG TEST PRSMV CHEM ANLYZR: CPT | Performed by: OBSTETRICS & GYNECOLOGY

## 2021-02-22 RX ORDER — BETAMETHASONE SODIUM PHOSPHATE AND BETAMETHASONE ACETATE 3; 3 MG/ML; MG/ML
12 INJECTION, SUSPENSION INTRA-ARTICULAR; INTRALESIONAL; INTRAMUSCULAR; SOFT TISSUE EVERY 24 HOURS
Status: COMPLETED | OUTPATIENT
Start: 2021-02-22 | End: 2021-02-23

## 2021-02-22 RX ORDER — SODIUM CHLORIDE, SODIUM LACTATE, POTASSIUM CHLORIDE, CALCIUM CHLORIDE 600; 310; 30; 20 MG/100ML; MG/100ML; MG/100ML; MG/100ML
125 INJECTION, SOLUTION INTRAVENOUS CONTINUOUS
Status: DISCONTINUED | OUTPATIENT
Start: 2021-02-22 | End: 2021-02-24 | Stop reason: HOSPADM

## 2021-02-22 RX ORDER — MORPHINE SULFATE 10 MG/ML
10 INJECTION, SOLUTION INTRAMUSCULAR; INTRAVENOUS ONCE
Status: COMPLETED | OUTPATIENT
Start: 2021-02-22 | End: 2021-02-22

## 2021-02-22 RX ORDER — ONDANSETRON 2 MG/ML
4 INJECTION INTRAMUSCULAR; INTRAVENOUS EVERY 8 HOURS PRN
Status: DISCONTINUED | OUTPATIENT
Start: 2021-02-22 | End: 2021-02-24 | Stop reason: HOSPADM

## 2021-02-22 RX ORDER — NIFEDIPINE 10 MG/1
30 CAPSULE ORAL ONCE
Status: COMPLETED | OUTPATIENT
Start: 2021-02-22 | End: 2021-02-22

## 2021-02-22 RX ORDER — NIFEDIPINE 10 MG/1
10 CAPSULE ORAL EVERY 6 HOURS
Status: DISCONTINUED | OUTPATIENT
Start: 2021-02-23 | End: 2021-02-23

## 2021-02-22 RX ADMIN — BETAMETHASONE SODIUM PHOSPHATE AND BETAMETHASONE ACETATE 12 MG: 3; 3 INJECTION, SUSPENSION INTRA-ARTICULAR; INTRALESIONAL; INTRAMUSCULAR at 21:30

## 2021-02-22 RX ADMIN — SODIUM CHLORIDE, SODIUM LACTATE, POTASSIUM CHLORIDE, AND CALCIUM CHLORIDE 1000 ML: .6; .31; .03; .02 INJECTION, SOLUTION INTRAVENOUS at 18:05

## 2021-02-22 RX ADMIN — MORPHINE SULFATE 10 MG: 10 INJECTION INTRAVENOUS at 22:45

## 2021-02-22 RX ADMIN — NIFEDIPINE 30 MG: 10 CAPSULE ORAL at 21:46

## 2021-02-22 RX ADMIN — SODIUM CHLORIDE, SODIUM LACTATE, POTASSIUM CHLORIDE, AND CALCIUM CHLORIDE 125 ML/HR: .6; .31; .03; .02 INJECTION, SOLUTION INTRAVENOUS at 21:27

## 2021-02-22 NOTE — ED NOTES
Per OB, patient to be seen in ED prior to [facility] consult        Antonieta Levine RN  02/22/21 9401

## 2021-02-22 NOTE — TELEPHONE ENCOUNTER
Prenatal patient called, reports that she fell outside in the snow on 2/21/2021  Patient is complaining of constant pain to left lower extremity  Patient instructed to go to ER for evaluation  Patient agreeable and verbalized understanding

## 2021-02-22 NOTE — PROGRESS NOTES
Triage Note - OB  Crismaylin Rhiannon 23 y o  female MRN: [id number]  Unit/Bed#: L&D 329-02 Encounter: [id number]    Chief Complaint: contractions    SUBJECTIVE    HPI: 23 y o   at 34w3d with c/o contractions  The patient reports falling  morning  She has come up from the ED after being ruled out for any injury  She states she started nicho this afternoon and that they keep coming irregularly  She denies any VB or LOF  She reports good fetal movement  OBJECTIVE  Previous deliveries were   Complications of pregnancy: none    GBS: unknown  Blood type: B positive    Estimated Date of Delivery: 21    Vitals: VSS  Vitals:    21 1730   BP: 118/59   Pulse: 103   Resp: 16   Temp: 98 °F (36 7 °C)   SpO2:      Body mass index is 40 5 kg/m²  FHR: 120s, reactive  [place]: irregular    Physical Exam  Constitutional:       Appearance: She is obese  HENT:      Head: Normocephalic and atraumatic  Pulmonary:      Effort: Pulmonary effort is normal    Abdominal:      Comments: Gravid   Musculoskeletal: Normal range of motion  Skin:     General: Skin is warm and dry  Neurological:      Mental Status: She is alert and oriented to person, place, and time  Psychiatric:         Mood and Affect: Mood normal          Behavior: Behavior normal          Thought Content: Thought content normal          Judgment: Judgment normal            Labs:   No visits with results within 1 Day(s) from this visit     Latest known visit with results is:   Lab on 2021   Component Date Value    WBC 2021 10 60     RBC 2021 4 30     Hemoglobin 2021 10 4*    Hematocrit 2021 33 2*    MCV 2021 77*    MCH 2021 24 2*    MCHC 2021 31 4     RDW 2021 14 3     MPV 2021 7 5*    Platelets  382     Neutrophils Relative 2021 74*    Lymphocytes Relative 2021 18*    Monocytes Relative 2021 7     Eosinophils Relative 2021 0     Basophils Relative 2021 0     Neutrophils Absolute 2021 7 90*    Lymphocytes Absolute 2021 1 90     Monocytes Absolute 2021 0 70     Eosinophils Absolute 2021 0 00     Basophils Absolute 2021 0 00     RPR 2021 Non-Reactive     Glucose 2021 107     RBC Morphology 2021 abnormal     Anisocytosis 2021 Present     Microcytes 2021 Present     Platelet Estimate  Adequate            A/P  23 y o  female  at 34w3d with c/o contractions after a fall 2 days ago       R/o labor   - SVE /-2, repeat in 2 hours   - 1L bolus LR    - Maintain baby on monitor    D/w Dr Debora Hummel MD  OB-GYN, PGY-1  2021 5:39 PM

## 2021-02-23 VITALS
RESPIRATION RATE: 18 BRPM | SYSTOLIC BLOOD PRESSURE: 104 MMHG | HEART RATE: 105 BPM | TEMPERATURE: 98.3 F | OXYGEN SATURATION: 98 % | BODY MASS INDEX: 40.5 KG/M2 | DIASTOLIC BLOOD PRESSURE: 58 MMHG | WEIGHT: 243.39 LBS

## 2021-02-23 PROBLEM — Z3A.34 34 WEEKS GESTATION OF PREGNANCY: Status: ACTIVE | Noted: 2021-02-23

## 2021-02-23 LAB — RPR SER QL: NORMAL

## 2021-02-23 PROCEDURE — 99232 SBSQ HOSP IP/OBS MODERATE 35: CPT | Performed by: OBSTETRICS & GYNECOLOGY

## 2021-02-23 RX ORDER — BUTORPHANOL TARTRATE 1 MG/ML
1 INJECTION, SOLUTION INTRAMUSCULAR; INTRAVENOUS ONCE
Status: COMPLETED | OUTPATIENT
Start: 2021-02-23 | End: 2021-02-23

## 2021-02-23 RX ADMIN — SODIUM CHLORIDE, SODIUM LACTATE, POTASSIUM CHLORIDE, AND CALCIUM CHLORIDE 125 ML/HR: .6; .31; .03; .02 INJECTION, SOLUTION INTRAVENOUS at 05:36

## 2021-02-23 RX ADMIN — BUTORPHANOL TARTRATE 1 MG: 1 INJECTION, SOLUTION INTRAMUSCULAR; INTRAVENOUS at 05:46

## 2021-02-23 RX ADMIN — BETAMETHASONE SODIUM PHOSPHATE AND BETAMETHASONE ACETATE 12 MG: 3; 3 INJECTION, SUSPENSION INTRA-ARTICULAR; INTRALESIONAL; INTRAMUSCULAR at 21:32

## 2021-02-23 NOTE — H&P
H&P Exam - Obstetrics   [facility] 23 [facility]  [facility] MRN: [id number]  Unit/Bed#: L&D 328-01 Encounter: [id number]      ASSESSMENT:  16yo  at 34w3d weeks gestation who is being admitted for observation for persistent contractions following a fall  Cervical exam of /-2 with an obstetric history significant for a prior  for PPROM at 32 weeks followed by a term RLTCS  Clinical picture not consistent with a placental abruption or uterine rupture at this time, however will continue to monitor patient overnight in case of possible concealed abruption and need for delivery  PLAN:    1  Contractions at 34w3d  - Admit for observation and continuous monitoring  - No signs of placental abruption on bedside TAUS  - Betamethasone for fetal lung maturity   - Tocolysis: Procardia 30 mg for loading dose and then 10 mg Q6H   - Morphine 10mg IM once for therapeutic rest   - IVFs at 125 cc/hr  - NPO in case of need for RLTCS overnight   - GBS collected  - CBC, Type and screen, RPR all ordered; UA negative for infection, UDS negative       Discussed with Dr Kingston Sagastume and Dr Jose Caba      This patient will be an OBSERVATION and I certify the anticipated length of stay is <2 Midnights  History of Present Illness     Chief Complaint: Contractions    HPI:  Terrell Gentile is a 23 y o   female with an JUSTINE of 2021, by Last Menstrual Period at 34w3d weeks gestation who is being admitted for observation for persistent contractions following a fall with a history of 2 prior c-sections with the first one being for PPROM at 32 weeks  Patient states that's he was outside around 1 AM on  morning when she fell on the ice and landed on her buttocks  She denies any vaginal bleeding or leakage of fluid  She reports good fetal movement  She is a SWOB patient  PREGNANCY COMPLICATIONS:   1  Fall with   2  Prior  x2  3  Prior  delivery at 26 weeks for PPROM  4  Obesity   5  Previous  death at 7 months following aspiration     OB History    Para Term  AB Living   3 2 1 1   1   SAB TAB Ectopic Multiple Live Births           2      # Outcome Date GA Lbr Tomasz/2nd Weight Sex Delivery Anes PTL Lv   3 Current            2 Term  40w0d  2948 g (6 lb 8 oz) F CS-Unspec  N ALKA   1   32w0d    CS-Unspec   ND      Birth Comments: PPROM       Baby complications/comments: None     Review of Systems  See above     Historical Information   History reviewed  No pertinent past medical history  Past Surgical History:   Procedure Laterality Date     SECTION      2     Social History   Social History     Substance and Sexual Activity   Alcohol Use No     Social History     Substance and Sexual Activity   Drug Use No     Social History     Tobacco Use   Smoking Status Never Smoker   Smokeless Tobacco Never Used         Meds/Allergies      Medications Prior to Admission   Medication    aspirin (ECOTRIN LOW STRENGTH) 81 mg EC tablet    calcium carbonate (TUMS) 500 mg chewable tablet    doxylamine (UNISON) 25 MG tablet    ferrous sulfate 324 (65 Fe) mg    Prenatal Vit-Fe Fumarate-FA (Prenatal/Folic Acid) TABS    Pyridoxine HCl (vitamin B-6) 25 MG tablet      No Known Allergies    OBJECTIVE:    Vitals: Blood pressure 118/59, pulse 103, temperature 98 °F (36 7 °C), temperature source Oral, resp  rate 16, weight 110 kg (243 lb 6 2 oz), last menstrual period 2020, SpO2 98 %  Body mass index is 40 5 kg/m²  Physical Exam  Constitutional:       General: She is not in acute distress  Appearance: She is well-developed  She is not toxic-appearing  Cardiovascular:      Rate and Rhythm: Normal rate and regular rhythm  Heart sounds: Normal heart sounds  Pulmonary:      Effort: Pulmonary effort is normal  No respiratory distress  Breath sounds: Normal breath sounds  Abdominal:      Tenderness: There is no abdominal tenderness  There is no guarding  Comments: Gravid uterus   Musculoskeletal:         General: No tenderness  Neurological:      Mental Status: She is alert and oriented to person, place, and time  Psychiatric:         Behavior: Behavior normal          Thought Content: Thought content normal          Cervix:   1/80/-2    Fetal heart rate:   Baseline Rate: 135 bpm  Variability: Moderate 6-25 bpm  Accelerations: 15 x 15 or greater  Decelerations: None    [place]:   Contraction Frequency (minutes): irritability with occasional contraction   Contraction Duration (seconds): 50-70  Contraction Quality: Mild      Invasive Devices     Peripheral Intravenous Line            Peripheral IV 02/22/21 Dorsal (posterior); Right Hand less than 1 day

## 2021-02-23 NOTE — PROGRESS NOTES
Tomas Watts is a 23 y o   at 34w4d     Chief complaint today: follow up    ROS:   VB: denies   LOF: denies   CXN: denies   FM: present    Vitals:    21 1430   BP: 110/71        bpm, baby boy    S/p admission at [facility] with concern for PTL    - SVE /2 in hospital, remained unchanged   - Pt received 2 doses of BTM and nifedipine in the setting of regular contractions   - She was discharged yesterday after receiving her second dose of BTM   - Pt denies regular contractions today   Anemia in pregnancy   - Ptencouraged to continue taking daily iron pills  Delivery consent obtained  - Signed today  - Pt amenable to blood transfusion in case of emergency   Vaccinations  - Tdap previously administered  RTC in 2 weeks  - Reviewed  labor precautions as well as ROM, dec FM, vaginal bleeding, and true vs false labor, pt to call with any questions and come to hospital with any concerns    COVID 19 precautions were discussed with patient at length, reviewed symptoms, hygiene, social distancing, patient to call office  with questions/concerns    D/w Dr Jaqueline Waller  PGY-1 OB/GYN   2021 2:40 PM

## 2021-02-23 NOTE — PROGRESS NOTES
Called for opinion on patient who presented with onset of contractions this afternoon  Had a fall yesterday without major injury  She has a hx of a 32 week PTD in 2012  Patient has no cervical change but continues to contract and patient states they feel uncomfortable  Dr Josi Miles would like to give steroids in case she progresses and procardia till to see if this would improve pain  US by her report does not show signs of an abruption and NST reassuring  At the time of this note my access to her NST is not working  I think this is a reasonable option  We can observe for the next 24 hrs while she gets her 2 steroid doses  Her last US was on 2/3  Baby was 4lb 11 oz and in the 70%  Blood type 0+  If patient is found to be in labor depite tocolysis then recommend a repeat CS at that time       Jess Pedro MD

## 2021-02-23 NOTE — UTILIZATION REVIEW
Notification of Inpatient Admission/Inpatient Authorization Request   This is a Notification of Inpatient Admission for Yung 46  Be advised that this patient was admitted to our facility under Inpatient Status  Contact Fabby Aceves at [phone] for additional admission information  [facility] DEPT  DEDICATED -[phone]  Patient Name:   Walker Paris   YOB: 2000       [address]:   [address]   Tax ID: [id number]  NPI: [id number] Attending Provider/NPI:  Phone:  Address: Adin Brandon [[id number]]  [phone]  Same as the facility   Place of Service Code: 24 Place of Service Name:  [address] [place] [address]   Start Date: 2/22/21 2052 Discharge Date & Time: No discharge date for patient encounter  Type of Admission: Inpatient Status Discharge Disposition   (if discharged): Home/Self Care   Patient Diagnoses: Back pain [M54 9]  Pelvic pain [R10 2]  Fall, initial encounter [W19  XXXA]     Orders: Admission Orders (From admission, onward)     Ordered        02/22/21 2059  INPATIENT ADMISSION  Once                    Assigned Utilization Review Contact: Fabby Aceves  Utilization   Network Utilization Review Department  Phone: [phone]; Fax [phone]  Email: [email or web address]   ATTENTION PAYERS: Please call the assigned Utilization  directly with any questions or concerns ALL voicemails in the department are confidential  Send all requests for admission clinical reviews, approved or denied determinations and any other requests to dedicated fax number belonging to the campus where the patient is receiving treatment

## 2021-02-23 NOTE — UTILIZATION REVIEW
Initial Clinical Review    Admission: Date/Time/Statement:   Admission Orders (From admission, onward)     Ordered        21  INPATIENT ADMISSION  Once                   Orders Placed This Encounter   Procedures    INPATIENT ADMISSION     Standing Status:   Standing     Number of Occurrences:   1     Order Specific Question:   Level of Care     Answer:   Med Surg [16]     Order Specific Question:   Estimated length of stay     Answer:   More than 2 Midnights     Order Specific Question:   Certification     Answer:   I certify that inpatient services are medically necessary for this patient for a duration of greater than two midnights  See H&P and MD Progress Notes for additional information about the patient's course of treatment  ED Arrival Information     Expected Arrival Acuity Means of Arrival Escorted By Service Admission Type    - 2021 15:48 [facility] Urgent    Arrival Complaint    Fall        Chief Complaint   Patient presents with    Fall     Patient 34 weeks pregnant, reports having a fall on Saturday and landing on her butt  Reporting right hip pain and pelvic pain  Denies vaginal discharge/bleeidng  Assessment/Plan: 19yo  at 34w3d weeks gestation to ED per OB GYN advise admitted  Inpatient for persistent contractions S/P fall on ice 1 day prior  Concern for PTL vs PTC  obstetric history significant for a prior  for PPROM at 32 weeks followed by a term RLTCS  EXAM: Cervical exam of /-2 , with contractions; toco irritability w occasional contraction lasting 50-70 sec  Maintain continuous monitoring, TAUS w no sign of abruption  Tocolysis w Procardia 30 mg for loading then 10 mg Q 6HR, pain management for therapeutic rest  IVF, NPO in need for RLTCS overnight, serial exams  Start  BTM for FLM  MFM consult      2021 9:58 PM OB Attending  Pt noted to make no cervical change, but noticed to have contractions every 3-4 minutes upon observation and pt reports contractions are worsening  2/23/2021 OB GYN Resident MD  23 y o  Evy Romero at 34w4d admitted with contractions s/p fall EXAM:  [place]: q10  S/P loading dose of Procardia- further doses held 2/2 to low BPs  S/p Morphine & Stadol overnight  SVE 1/80/-2, cont toco, BTM 2/22-2/23- second dose tonight at 2130  Plan for RLTCS    2/23/2021 OB Attending  Pt feeling improved today  BP remains on the lower side (90's/60's) so hold Procardia tocolysis  She only received one loading dose last night   Cont monitoring throughout today and tonight until tomorrow  ED Triage Vitals   Temperature Pulse Respirations Blood Pressure SpO2   02/22/21 1553 02/22/21 1553 02/22/21 1553 02/22/21 1553 02/22/21 1553   97 6 °F (36 4 °C) 92 18 133/64 98 %      Temp Source Heart Rate Source Patient Position - Orthostatic VS BP Location FiO2 (%)   02/22/21 1553 02/22/21 1553 02/22/21 1553 02/22/21 1553 --   Oral Monitor Lying Right arm       Pain Score       02/22/21 1633       5          Wt Readings from Last 1 Encounters:   02/22/21 110 kg (243 lb 6 2 oz)     Additional Vital Signs:   Date/Time  Temp  Pulse  Resp  BP  SpO2  O2 Device  Cardiac (WDL)  Patient Position - Orthostatic VS   02/23/21 0548  98 °F (36 7 °C)  96  14  93/52   --  --  --  Lying   BP: Dr Brian Ken aware at 02/23/21 0548   02/23/21 0333  98 1 °F (36 7 °C)  92  14  95/46Abnormal    --  --  --  Lying   BP: Dr Brian Ken aware; procardia not given at 02/23/21 0333   02/23/21 0200  --  --  --  --  --  None (Room air)  WDL  --   02/23/21 0006  --  101  --  100/61  --  --  --  --   02/23/21 0005  --  102  --  97/52   --  --  --  --   BP: cuff too large; gave appropriate sized cuff at 02/23/21 0005   02/22/21 2151  --  93  14  107/62  --  --  --  Lying   02/22/21 2148  98 °F (36 7 °C)  --  --  --  --  --  --  --   02/22/21 2110  --  --  --  118/68  --  --  --  --   02/22/21 1730  98 °F (36 7 °C)  103  16  118/59  --  --  --  --   02/22/21 1553  97 6 °F (36 4 °C)  92 18  133/64  98 %  None (Room air)  --  Lying      Weights (last 14 days)    Date/Time  Weight  Weight Method   02/22/21 1553  110 kg (243 lb 6 2 oz)  Standing scale       Pertinent Labs/Diagnostic Test Results:       Results from last 7 days   Lab Units 02/22/21 2118   WBC Thousand/uL 13 42*   HEMOGLOBIN g/dL 11 0*   HEMATOCRIT % 35 6   PLATELETS Thousands/uL 367     Results from last 7 days   Lab Units 02/22/21 2118   CLARITY UA  Slightly Cloudy   COLOR UA  Yellow   SPEC GRAV UA  >=1 030   PH UA  6 0   GLUCOSE UA mg/dl Negative   KETONES UA mg/dl Trace*   BLOOD UA  Negative   PROTEIN UA mg/dl Trace*   NITRITE UA  Negative   BILIRUBIN UA  Negative   UROBILINOGEN UA E U /dl 0 2   LEUKOCYTES UA  Negative   WBC UA /hpf 4-10*   RBC UA /hpf 1-2*   BACTERIA UA /hpf Moderate*   EPITHELIAL CELLS WET PREP /hpf Occasional             Results from last 7 days   Lab Units 02/22/21 2118   AMPH/METH  Negative   BARBITURATE UR  Negative   BENZODIAZEPINE UR  Negative   COCAINE UR  Negative   METHADONE URINE  Negative   OPIATE UR  Negative   PCP UR  Negative   THC UR  Negative       ED Treatment:   Medication Administration from 02/22/2021 1548 to 02/22/2021 1712     None        History reviewed  No pertinent past medical history  Present on Admission:  **None**    Admitting Diagnosis: Back pain [M54 9]  Pelvic pain [R10 2]  Fall, initial encounter [W19  XXXA]  Age/Sex: 23 y o  female  Admission Orders:  continuous external uteri contraction & fetal HR monitoring  BTM IM x2 q 24 hr apart      Scheduled Medications:  betamethasone acetate-betamethasone sodium phosphate, 12 mg, Intramuscular, Q24H  NIFEdipine, 10 mg, Oral, Q6H    Continuous IV Infusions:  lactated ringers, 125 mL/hr, Intravenous, Continuous      PRN Meds:  ondansetron, 4 mg, Intravenous, Q8H PRN    Network Utilization Review Department  ATTENTION: Please call with any questions or concerns to [phone] and carefully listen to the prompts so that you are directed to the right person  All voicemails are confidential   Masha Aviles all requests for admission clinical reviews, approved or denied determinations and any other requests to dedicated fax number below belonging to the campus where the patient is receiving treatment   List of dedicated fax numbers for the Facilities:  [address] DENIALS (Administrative/Medical Necessity) [phone]   [address] (Maternity/NICU/Pediatrics) [phone] [address] Dr Miguel Calero 6319 (  Eliazar Burrell [facility] "Bianca" 103) [address] [place] [address] Khadijah Barrera 14[address] [place] [address] [phone]

## 2021-02-23 NOTE — PROGRESS NOTES
HRRR; Lungs CTA; BS normoactive  Pattie's sign negative bilaterally  Pt states no LOF/bleeding/contractions  Pt reports good fetal movement

## 2021-02-23 NOTE — PROGRESS NOTES
Progress Note - OBSTETRICS  Crismaylin Rhiannon 23 y o  female MRN: [id number]  Unit/Bed#: L&D 328-01 Encounter: [id number]    Assessment:  23 y o   at 34w4d admitted with contractions s/p fall  Plan:    #1  Contractions:  S/p loading dose of procardia; subsequent doses held 2/2 low blood pressures  S/p morphine and stadol overnight  SVE 1/80/-2, unchanged throughout multiple checks  Continue toco monitoring    #2  IUP at 34w4d:  BMT - (2nd dose tonight @ 2130)  Continued FHR monitoring    #3  Prior  x2:  Plan for RLTCS    FEN: advance to CLD      Subjective/Objective     Subjective:     Feeling well this morning  Feeling some contractions, but comfortable  No vaginal bleeding and no LOF  +fetal movement  Objective:     Vitals:   Vitals:    21 0005 21 0006 21 0333 21 0548   BP: 97/52 100/61 (!) 95/46 93/52   BP Location:   Right arm Right arm   Pulse: 102 101 92 96   Resp:   14 14   Temp:   98 1 °F (36 7 °C) 98 °F (36 7 °C)   TempSrc:   Oral Oral   SpO2:       Weight:             Physical Exam  Constitutional:       General: She is not in acute distress  Appearance: She is not ill-appearing  Cardiovascular:      Rate and Rhythm: Normal rate and regular rhythm  Heart sounds: Normal heart sounds  Pulmonary:      Effort: Pulmonary effort is normal  No respiratory distress  Breath sounds: Normal breath sounds  Skin:     General: Skin is warm and dry  Neurological:      Mental Status: She is alert and oriented to person, place, and time  Psychiatric:         Mood and Affect: Mood normal          Behavior: Behavior normal            Fetal Assessment:  FHT: 130 / Moderate 6 - 25 bpm / no decels, reactive  [place]: q10    Lab, Imaging and other studies: I have personally reviewed pertinent reports        Lab Results   Component Value Date    WBC 13 42 (H) 2021    HGB 11 0 (L) 2021    HCT 35 6 2021    MCV 79 (L) 2021  02/22/2021       Lab Results   Component Value Date    CALCIUM 9 9 06/12/2020    K 3 7 06/12/2020    CO2 23 06/12/2020     06/12/2020    BUN 15 06/12/2020    CREATININE 0 76 06/12/2020       Lab Results   Component Value Date    ALT 25 06/12/2020    AST 33 06/12/2020    ALKPHOS 89 06/12/2020       Jordan Mclain MD  OBGYN, PGY-3  2/23/2021  6:40 AM

## 2021-02-24 PROCEDURE — NC001 PR NO CHARGE: Performed by: OBSTETRICS & GYNECOLOGY

## 2021-02-24 PROCEDURE — 99238 HOSP IP/OBS DSCHRG MGMT 30/<: CPT | Performed by: OBSTETRICS & GYNECOLOGY

## 2021-02-24 NOTE — DISCHARGE INSTRUCTIONS
El embarazo de la semana 31 a la 34   LO QUE NECESITA SABER:   Es posible que usted continúe teniendo síntomas tales romario falta de [place], [place], contracciones o inflamación en amanda tobillos y pies  Usted podría estar aumentando 1 sridhar por semana ahora  INSTRUCCIONES SOBRE EL NATALIA HOSPITALARIA:   Busque atención médica de inmediato si:  · Usted presenta un jackie dolor de maría que no desaparece  · Usted tiene cambios en la visión nuevos o en aumento, romario visión borrosa o con manchas  · Usted tiene inflamación nueva o creciente en cummings breana o beth  · Usted tiene manchado o sangrado vaginal     · Usted rompe thomas o siente un chorro o gotas de agua tibia que le está bajando por cummings vagina  Comuníquese con cummings médico si:  · Usted tiene más de 5 contracciones en 1 hora  · Usted nota algún cambio en los movimientos de cummings bebé  · Usted tiene calambres, presión o tensión abdominal     · Usted tiene un cambio en la secreción vaginal     · Usted tiene escalofríos o fiebre  · Usted tiene comezón, ardor o dolor vaginal     · Usted tiene naif secreción vaginal amarillenta, verdosa, ochoa o de [employer]  · Usted tiene dolor o ardor al Aretha Sean, orina menos de lo habitual o tiene [place] rosada o sanguinolenta  · Usted tiene preguntas o inquietudes acerca de cummings condición o cuidado  Cómo cuidarse en esta etapa de cummings embarazo:  · Consuma alimentos saludables y variados  Alimentos saludables incluyen frutas, verduras, panes de florinda integral, alimentos lácteos bajos en grasa, frijoles, jorge luis magras y pescado  [place] líquidos romario se le haya indicado  Pregunte cuánto líquido debe rubina cada día y cuáles líquidos son los más adecuados para usted  Limite el consumo de cafeína a menos de [place] [address]  Limite el consumo de pescado a 2 porciones cada semana  Escoja pescado con concentraciones bajas de kelly romario atún al natural enlatado, camarón, salmón, bacalao o tilapia   No coma pescado con concentraciones altas de kelly romario pez franca, caballa gigante, pargo rayado y tiburón  · Controle la acidez comiendo 4 o 5 comidas pequeñas cada día en vez de comidas grandes  Evite los alimentos picantes  · Controle la inflamación al [facility] y poner los pies en alto o elevados sobre [place]  · [address] [place] [place]  [place] necesidad de ciertas vitaminas y [facility], romario el ácido fólico, aumenta omid el [facility]  Las vitaminas prenatales proporcionan algunas de las vitaminas y minerales adicionales que usted necesita  Las vitaminas prenatales también podrían ayudar a disminuir el riesgo de ciertos defectos de nacimiento  · Consulte con cummings médico acerca de hacer ejercicio  El ejercicio moderado puede ayudarla a mantenerse en forma  [place] médico la ayudará a planear un programa de ejercicios que sea seguro para usted omid cummings [facility]  · No fume  Fumar aumenta el riesgo de aborto espontáneo y otros problemas de jatinder omid cummings [facility]  Fumar puede causar que cummings bebé nazca antes de tiempo o que pese menos al nacer  Solicite información a cummings médico si usted necesita ayuda para dejar de fumar  · No consuma alcohol  El alcohol pasa de cummings cuerpo al bebé a través de la placenta  Puede afectar el desarrollo del cerebro de cummings bebé y provocar el síndrome de alcoholismo fetal (SAF)  El SAF es un marcella de condiciones que causan problemas [place], de comportamiento y de crecimiento  · Consulte con cummings médico antes de rubina cualquier medicamento  Muchos medicamentos pueden perjudicar a cummings bebé si usted los mala [address] [place] [address]  No tome ningún medicamento, vitaminas, hierbas o suplementos sin michaela consultar con cummings Camilo Cage  use drogas ilegales o de la aranda (romario marihuana o cocaína) mientras está embarazada  Consejos de seguridad omid el embarazo:  · Evite jacuzzis y saunas   No use un jacuzzi o un sauna mientras usted está embarazada, especialmente omid el primer trimestre  Los [facility] y los saunas aumentan la temperatura de cummings bebé y el riesgo de defectos de nacimiento  · Evite la toxoplasmosis  [place] es naif infección causada por comer carne cruda o estar cerca del excremento de un dilcia infectado  [place] puede causar malformaciones congénitas, aborto espontáneo y Cullen Schein  Lávese las beth después de tocar carne cruda  Asegúrese de que la carne esté zayda cocida antes de comerla  Evite los huevos crudos y la Magali Gulling  Use guantes o pida que alguien la ayude a limpiar la caja de arena del dilcia mientras usted Clemon Karissa  Cambios que están ocurriendo con cummings bebé: Para las 34 semanas, cummings bebé podría pesar más de [address]  Cummings bebé medirá alrededor de 12 ½ pulgadas de lien desde el cory de la maría hasta la rabadilla (parte inferior del bebé)  Cummings bebé está aumentando alrededor de ½ sridhar por semana  Ahora cummings bebé puede abrir y cerrar amanda ojos  Las patadas y movimientos de cummings bebé son más zane en iveth momento  Lo que necesita saber acerca del cuidado prenatal: Cummings médico le revisará cummings presión arterial y [place]  Es posible que también necesite lo siguiente:  · Un examen de orina también podría realizarse para revisarle el azúcar y la proteína  Estas son señales de diabetes gestacional o de infección  La proteína en cummings orina también podría ser naif señal de preeclampsia  La preeclampsia es naif condición que puede desarrollarse omid la semana 21 o después en cummings embarazo  Esta provoca presión arterial nahum y [employer] con amanda riñones y [place]  · La vacuna Tdap podría ser recomendado por cummings médico     · La altura uterina es naif medición del útero para controlar el desarrollo de cummings bebé  [employer] por lo general es igual al número de [address] que usted tiene de embarazo  Cummings médico también podría revisar la posición de cummings bebé  · El ritmo cardíaco de cummings bebé será revisado      © Copyright [employer] Information is for [employer] use only and may not be sold, redistributed or otherwise used for commercial purposes  All illustrations and images included in CareNotes® are the copyrighted property of A D A M , Inc  or [address] [place] [address] es sólo para uso en educación  [place]umm[place] intención no es darle un consejo médico sobre enfermedades o tratamientos  Colsulte con cummings Yogesh Older farmacéutico antes de seguir cualquier régimen médico para saber si es seguro y efectivo para usted

## 2021-02-24 NOTE — PROGRESS NOTES
Progress Note - OBSTETRICS  Crismaylin Rhiannon 23 y o  female MRN: [id number]  Unit/Bed#: L&D 328-01 Encounter: [id number]    Assessment:  23 y o   at 34w5d admitted with contractions s/p fall  Plan:    #1  Contractions:  S/p loading dose of procardia; subsequent doses held 2/2 low blood pressures  S/p morphine and stadol overnight  SVE 80/-2, unchanged throughout multiple checks  Continue toco monitoring    #2  IUP at 34w5d:  Betamethasone for fetal lung maturity -    #3  Prior  x2:  Plan for RLTCS    FEN: regular  Dispo: discharge home today      Subjective/Objective     Subjective:     Feeling well this morning  Feeling some rare intermittent contractions, but comfortable  No vaginal bleeding and no LOF  +fetal movement  Objective:     Vitals:   Vitals:    21 0704 21 0859 21 1518 21 1925   BP: 99/56 96/51 102/57 104/58   BP Location:   Right arm Right arm   Pulse: 86 93 99 105   Resp:   18 18   Temp: 98 3 °F (36 8 °C)  (!) 97 4 °F (36 3 °C) 98 3 °F (36 8 °C)   TempSrc: Oral  Oral Oral   SpO2:       Weight:             Physical Exam  Constitutional:       General: She is not in acute distress  Appearance: She is not ill-appearing  Cardiovascular:      Rate and Rhythm: Normal rate and regular rhythm  Heart sounds: Normal heart sounds  Pulmonary:      Effort: Pulmonary effort is normal  No respiratory distress  Breath sounds: Normal breath sounds  Skin:     General: Skin is warm and dry  Neurological:      Mental Status: She is alert and oriented to person, place, and time  Psychiatric:         Mood and Affect: Mood normal          Behavior: Behavior normal        Fetal Assessment:  FHT: 120 / Moderate 6 - 25 bpm / no decels, reactive  [place]: q10    Lab, Imaging and other studies: I have personally reviewed pertinent reports        Lab Results   Component Value Date    WBC 13 42 (H) 2021    HGB 11 0 (L) 2021    HCT 35 6 02/22/2021    MCV 79 (L) 02/22/2021     02/22/2021       Lab Results   Component Value Date    CALCIUM 9 9 06/12/2020    K 3 7 06/12/2020    CO2 23 06/12/2020     06/12/2020    BUN 15 06/12/2020    CREATININE 0 76 06/12/2020       Lab Results   Component Value Date    ALT 25 06/12/2020    AST 33 06/12/2020    ALKPHOS 89 06/12/2020         Paulo Miller MD  OBGYN, PGY-3  2/24/2021  6:15 AM

## 2021-02-24 NOTE — NURSING NOTE
Patient taken off of the monitor as per discussed with Dr Griselda Coughlin    Will do an NST at 6 am

## 2021-02-24 NOTE — DISCHARGE SUMMARY
Discharge Summary - OB/GYN   Crismaylin Rhiannon 23 y o  female MRN: [id number]  Unit/Bed#: L&D 328-01 Encounter: [id number]      Admission Date: 2021     Discharge Date: 21     Admitting Diagnosis:   1  Pregnancy at 34w5d  2   contractions  3  Hx C/S   4  Hx  delivery   5  BMI 40      Discharge Diagnosis:   1  Pregnancy at 34w5d  2   contractions  3  Hx C/S   4  Hx  delivery   5  BMI 40      Procedures: none    Admitting Attending: Elpidio Briseno MD   Discharging Attending: Solange Cowan MD     Hospital Course:     Scar Franklin is a 23 y o  [id number] female who was initially admitted to L&D on 2021 at 34w3d for concern for  labor after patient experienced a fall on ice over the weekend  On arrival to L&D patient was continuously monitored and found to have regular contractions every 4 min that were uncomfortable for her  She received IV morphine once for pain control  SVE on arrival was 1/70/-2  Given regular  contraction with history of 2 prior  sections, the patient was admitted for observation     Patient was full course of BTM for fetal lung maturity due to concern for  contractions and possible  labor  Patient was given IV fluids  Loading dose of Procardia PO was given for tocolysis  However, subsequent hypotension was noted and subsequent doses were not given  For the remainder of her admission, patient's cervical exam remained stable at 1/80/-2  She was tolerating PO intake  She remained on continuous monitoring  Fetus had a category I FHT  Her contractions were noted to resolve and her pain improved  On day of discharge, patient's pain was improved and she was not regularly nicho  She was discharged on 21 in stable condition       Complications: none apparent    Condition at discharge: good     Discharge instructions/Information to patient and family:   See after visit summary for information provided to patient and family  Provisions for Follow-Up Care:  See after visit summary for information related to follow-up care and any pertinent home health orders  Disposition: Home    Planned Readmission: Yes (for her delivery)    Discharge instructions/Information to patient and family:   See AVS for all discharge instructions      Discharge Medications:   Prenatal vitamin daily  Aspirin daily   Tums as needed  Vit B6 as needed   Unisom as needed    Provisions for Follow-Up Care:  Please follow up with your doctor in 1 week

## 2021-02-25 ENCOUNTER — ROUTINE PRENATAL (OUTPATIENT)
Dept: OBGYN CLINIC | Facility: CLINIC | Age: 21
End: 2021-02-25

## 2021-02-25 VITALS — DIASTOLIC BLOOD PRESSURE: 71 MMHG | WEIGHT: 246 LBS | BODY MASS INDEX: 40.94 KG/M2 | SYSTOLIC BLOOD PRESSURE: 110 MMHG

## 2021-02-25 DIAGNOSIS — Z3A.34 34 WEEKS GESTATION OF PREGNANCY: Primary | ICD-10-CM

## 2021-02-25 LAB — GP B STREP DNA SPEC QL NAA+PROBE: NEGATIVE

## 2021-02-25 PROCEDURE — 99214 OFFICE O/P EST MOD 30 MIN: CPT | Performed by: OBSTETRICS & GYNECOLOGY

## 2021-03-08 ENCOUNTER — NURSE TRIAGE (OUTPATIENT)
Dept: OTHER | Facility: OTHER | Age: 21
End: 2021-03-08

## 2021-03-09 ENCOUNTER — HOSPITAL ENCOUNTER (OUTPATIENT)
Facility: HOSPITAL | Age: 21
Discharge: HOME | End: 2021-03-09
Attending: OBSTETRICS & GYNECOLOGY | Admitting: OBSTETRICS & GYNECOLOGY
Payer: COMMERCIAL

## 2021-03-09 VITALS
DIASTOLIC BLOOD PRESSURE: 64 MMHG | RESPIRATION RATE: 18 BRPM | TEMPERATURE: 99.2 F | SYSTOLIC BLOOD PRESSURE: 122 MMHG | HEART RATE: 117 BPM

## 2021-03-09 PROBLEM — Z3A.36 36 WEEKS GESTATION OF PREGNANCY: Status: ACTIVE | Noted: 2021-02-23

## 2021-03-09 PROCEDURE — 99212 OFFICE O/P EST SF 10 MIN: CPT

## 2021-03-09 PROCEDURE — NC001 PR NO CHARGE: Performed by: OBSTETRICS & GYNECOLOGY

## 2021-03-09 NOTE — PROGRESS NOTES
L&D Triage Note - OB/GYN  Crismaylin Rhiannon 23 y o  female MRN: [id number]  Unit/Bed#: L&D 329-01 Encounter: [id number]    Patient is seen by [facility]    ASSESSMENT:  Tomas Watts is a 23 y o   at 36w4d who presents with contractions over the last 24h  Patient appears comfortable  Hx C/S x 2  PLAN:   Contractions   - SVE unchanged from prior admission, /2   - Offered Tylenol for pain, patient declined as she was comfortable   - Offered patient 2 hr observation and repeat cervical exam v d/c home with precautions  Patient opted for d/c home  She will return if contractions worsen or get closer together  Disposition  - Discharge from [facility] triage   -  labor precautions provided  She should call her ObGyn with any concerns  - Has next prenatal appointment    - Continue routine prenatal care   - Case discussed with Dr Sridhar Varmaape:  Tomas Watts is a 23 y o   at 36w4d with an Estimated Date of Delivery: 21 who presents with contractions occurring since 6 am  She denies LOF, VB  Endorses fetal movements  Patient was previously admitted to [address] [place] [address] L&D  -  for regular  contractions after experiencing a fall on ice  She was found to be 1 cm dilated and admitted for observation  She did not make cervical change and was discharged on   Pertinent history:  - Hx C/S x 2  - Hx PPROM @ 32 weeks  - Hx  demise   - BMI 40     ROS:  Constitutional: Negative  Respiratory: Negative  Cardiovascular: Negative    Gastrointestinal: Negative    OBJECTIVE:  Vitals:    21 0022   BP: 122/64   Pulse: (!) 117   Resp: 18   Temp: 99 2 °F (37 3 °C)       Physical Exam:  There is no height or weight on file to calculate BMI  General: NAD, cooperative, pleasant, comfortable  Cardiovascular: S1/S2 normal  Lungs: normal respiratory efforts  Abdomen: Soft, nontender  Gravid uterus     Lower extremeties: nontender    SVE: 1/80/-2    Fetal monitoring:  FHT:  140 bpm/ Moderate 6 - 25 bpm / accelerations present, no decelerations  Lolo: occasional, irregular contractions q6-8     Dinora Resendiz MD  PGY-2, OB/GYN  3/9/2021 12:33 AM

## 2021-03-09 NOTE — TELEPHONE ENCOUNTER
Reason for Disposition   Contractions < 10 minutes apart for 1 hour (i e , 6 or more contractions an hour)    Answer Assessment - Initial Assessment Questions  1  ONSET: "When did the symptoms begin?"         15-20 minutes   2  CONTRACTIONS: "Describe the contractions that you are having " (e g , duration, frequency, regularity, severity)     Every 5 minutes  3  JUSTINE: "What date are you expecting to deliver?"      End of March, early April  4  PARITY: "Have you had a baby before?" If yes, "How long did the labor last?"      Third baby  5  FETAL MOVEMENT: "Has the baby's movement decreased or changed significantly from normal?"      denies  6   OTHER SYMPTOMS: "Do you have any other symptoms?" (e g , leaking fluid from vagina, fever, hand/facial swelling)  denies    Protocols used: PREGNANCY - LABOR - -ADULT-

## 2021-03-09 NOTE — TELEPHONE ENCOUNTER
Tiger connect message sent to Atiya Diez "Patient Gus Doddeal [birth year] is 36 weeks 3 days pregnant  Patient is having contractions for the past hour every 5 minutes apart  Denies decreased fetal movement  Denies leakage of fluid  Patient states she also has pelvic pain  How should I advise patient?"    Tiger connect message from Atiya Diez "can send her in for evaluation at [facility], [place] "    Notified patient and patient verbalized understanding  Notified labor and delivery at [address]

## 2021-03-09 NOTE — DISCHARGE INSTRUCTIONS
El embarazo de la semana 35 a la 38   CUIDADO AMBULATORIO:   Qué cambios están ocurriendo en cummings cuerpo: Al principio de las 37 semanas se considera que usted está en término completo  Podría ser mas fácil que usted respire si cummings bebé se ha posicionado con la maría hacia abajo  Es posible que usted necesite orinar con mayor frecuencia ya que el bebé podría estar presionando cummings vejiga  Usted también podría sentir más molestias y cansarse fácilmente  Busque atención médica de inmediato si:  · Usted presenta un jackie dolor de maría que no desaparece  · Usted tiene cambios en la visión nuevos o en aumento, romario visión borrosa o con manchas  · Usted tiene inflamación nueva o creciente en cummings breana o beth  · Usted tiene manchado o sangrado vaginal     · Usted rompe thomas o siente un chorro o gotas de agua tibia que le está bajando por cummings vagina  Comuníquese con cummings médico si:  · Usted tiene más de 5 contracciones en 1 hora  · Usted nota algún cambio en los movimientos de cummings bebé  · Usted tiene calambres, presión o tensión abdominal     · Usted tiene un cambio en la secreción vaginal     · Usted tiene escalofríos o fiebre  · Usted tiene comezón, ardor o dolor vaginal     · Usted tiene naif secreción vaginal amarillenta, verdosa, ochoa o de [employer]  · Usted tiene dolor o ardor al Lonney Rein, orina menos de lo habitual o tiene [place] rosada o sanguinolenta  · Usted tiene preguntas o inquietudes acerca de cummings condición o cuidado  Cómo cuidarse en esta etapa de cummings embarazo:  · Consuma alimentos saludables y variados  Alimentos saludables incluyen frutas, verduras, panes de florinda integral, alimentos lácteos bajos en grasa, frijoles, jorge luis magras y pescado  [place] líquidos romario se le haya indicado  Pregunte cuánto líquido debe rubina cada día y cuáles líquidos son los más adecuados para usted  Limite el consumo de cafeína a menos de [address]   Limite el consumo de pescado a 2 porciones cada semana  Escoja pescado con concentraciones bajas de kelly romario atún al natural enlatado, camarón, salmón, bacalao o tilapia  No coma pescado con concentraciones altas de kelly romario pez franca, caballa gigante, pargo rayado y tiburón  · [address] [place]  Cummings necesidad de ciertas vitaminas y [address], romario el ácido fólico, aumenta omid el [facility]  Las vitaminas prenatales proporcionan algunas de las vitaminas y minerales adicionales que usted necesita  Las vitaminas prenatales también podrían ayudar a disminuir el riesgo de ciertos defectos de nacimiento  · Descanse tanto romario sea necesario  Levante amanda pies si tiene inflamación en amanda tobillos y pies  · No fume  Fumar aumenta el riesgo de aborto espontáneo y otros problemas de jatinder omid cummings [facility]  Fumar puede causar que cummings bebé nazca antes de tiempo o que pese menos al nacer  Solicite información a cummings médico si usted necesita ayuda para dejar de fumar  · No consuma alcohol  El alcohol pasa de cummings cuerpo al bebé a través de la placenta  Puede afectar el desarrollo del cerebro de cummings bebé y provocar el síndrome de alcoholismo fetal (SAF)  El SAF es un marcella de condiciones que causan problemas [facility], de comportamiento y de crecimiento  · Consulte con cummings médico antes de rubina cualquier medicamento  Muchos medicamentos pueden perjudicar a cummings bebé si usted los mala [address] [place] [address]  No tome ningún medicamento, vitaminas, hierbas o suplementos sin michaela consultar con cummings [place]  use drogas ilegales o de la aranda (romario marihuana o cocaína) mientras está embarazada  · Hable con cummings médico antes de viajar  Es posible que no pueda viajar en avión después de las [address]  También le puede recomendar que evite largos viajes por carretera  Consejos de seguridad omid el embarazo:  · Evite jacuzzis y saunas   No use un jacuzzi o un sauna mientras usted está [place], especialmente omid el primer trimestre  Los [facility] y los saunas aumentan la temperatura de bella bebé y el riesgo de defectos de nacimiento  · Evite la toxoplasmosis  [place] es naif infección causada por comer carne cruda o estar cerca del excremento de un dilcia infectado  [place] puede causar malformaciones congénitas, aborto espontáneo y Cullen Schein  Lávese las beth después de tocar carne cruda  Asegúrese de que la carne esté zayda cocida antes de comerla  Evite los huevos crudos y la [place]  Use guantes o pida que alguien la ayude a limpiar la caja de arena del dilcia mientras usted Elisa Patel  · Consulte con bella médico acerca de viajar  El [address] cómodo para viajar es omid el shilpa trimestre  Pregunte a bella médico si usted puede viajar después de las 36 semanas  Es posible que no pueda viajar en avión después de las 36 [address]  También le puede recomendar que evite largos viajes por carretera  Cambios que están ocurriendo con bella bebé: Para las 38 semanas, bella bebé podría pesar entre 6 y 5 libras  Bella bebé podría medir alrededor de 14 pulgadas de lien desde la punta de la maría hasta la rabadilla (parte inferior del bebé)  Bella bebé escucha lo suficientemente zayda romario para reconocer bella voz  Conforme bella bebé crece más, usted podría sentir menos patadas y sentir más que bella bebé se estira y [place]  Bella bebé podría moverse en posición con la maría hacia abajo  Bella bebé también descansará en la parte inferior de bella abdomen  Lo que necesita saber acerca del cuidado prenatal: Bella médico le revisará bella presión arterial y [place]  Es posible que también necesite lo siguiente:  · Un examen de orina también podría realizarse para revisarle el azúcar y la proteína  Estas son señales de diabetes gestacional o de infección  La proteína en bella orina también podría ser naif señal de preeclampsia  La preeclampsia es naif condición que puede desarrollarse omid la semana 21 o después en bella embarazo   Esta provoca presión arterial nahum y [employer] con amanda riñones y [place]  · Los análisis de carlos se pueden realizar para revisar si tiene signos de anemia (nivel bajo del ailyn)  · La vacuna Tdap podría ser recomendado por cummings médico     · El examen del estreptococo del marcella B es un examen que se realiza para verificar si hay infección por estreptococos del marcella B  El estreptococo del marcella B es un tipo de bacteria que se puede encontrar en la vagina o el recto  Podría ser transmitida a cummings bebé omid el parto si usted la tiene  Cummings médico podría rubina [place] de cummings vagina o recto y amaury la [place] al laboratorio para que la examinen  · La altura uterina es naif medición del útero para controlar el desarrollo de cummings bebé  [employer] por lo general es igual al número de [facility] que usted tiene de embarazo  Cummings médico también podría revisar la posición de cummings bebé  · El ritmo cardíaco de cummings bebé será revisado  © Copyright [address] Information is for End User's use only and may not be sold, redistributed or otherwise used for commercial purposes  All illustrations and images included in CareNotes® are the copyrighted property of [employer]  or [address] [place] [address] es sólo para uso en educación  Cummings intención no es darle un consejo médico sobre enfermedades o tratamientos  Colsulte con cummings Clau Rob farmacéutico antes de seguir cualquier régimen médico para saber si es seguro y efectivo para usted

## 2021-03-09 NOTE — TELEPHONE ENCOUNTER
Regarding: contractions  ----- Message from [facility] sent at 3/8/2021 10:59 PM EST -----  " Starting to have contractions for the past hour and are about 5 minutes apart but not sure how long they are last "

## 2021-03-11 ENCOUNTER — ROUTINE PRENATAL (OUTPATIENT)
Dept: OBGYN CLINIC | Facility: CLINIC | Age: 21
End: 2021-03-11

## 2021-03-11 VITALS
BODY MASS INDEX: 41.75 KG/M2 | SYSTOLIC BLOOD PRESSURE: 109 MMHG | HEIGHT: 65 IN | HEART RATE: 103 BPM | DIASTOLIC BLOOD PRESSURE: 74 MMHG | WEIGHT: 250.6 LBS

## 2021-03-11 DIAGNOSIS — Z3A.36 36 WEEKS GESTATION OF PREGNANCY: ICD-10-CM

## 2021-03-11 DIAGNOSIS — Z34.93 PRENATAL CARE IN THIRD TRIMESTER: Primary | ICD-10-CM

## 2021-03-11 DIAGNOSIS — O99.213 OBESITY AFFECTING PREGNANCY IN THIRD TRIMESTER: ICD-10-CM

## 2021-03-11 DIAGNOSIS — E66.09 CLASS 2 OBESITY DUE TO EXCESS CALORIES WITHOUT SERIOUS COMORBIDITY WITH BODY MASS INDEX (BMI) OF 38.0 TO 38.9 IN ADULT: ICD-10-CM

## 2021-03-11 PROCEDURE — 59025 FETAL NON-STRESS TEST: CPT | Performed by: NURSE PRACTITIONER

## 2021-03-11 PROCEDURE — 99215 OFFICE O/P EST HI 40 MIN: CPT | Performed by: NURSE PRACTITIONER

## 2021-03-11 NOTE — PATIENT INSTRUCTIONS
LABOR PRECAUTIONS  Call our office at [phone] for any of the following:    * I need to call immediately I if I have even a small amount of LIQUID  leaking from my vagina, with or without contractions  * I need to call if I am BLEEDING an amount equal to or more than a period  A small amount of bloody vaginal discharge is normal at the end of the pregnancy  * I need to call if I am having CONTRACTIONS  every five minutes for at least an hour  I will need a watch in order to time them  I should time them from the beginning of one contraction until the beginning of the next one  * I need to call BEFORE  I go to the hospital    * I need to have a plan for TRANSPORTATION  to get to the hospital when I am in labor  Labor is generally not an emergency which requires an ambulance  FETAL KICK COUNTS    In the third trimester (after 28 weeks gestation) you should be performing fetal kick counts every day  Your baby should move at least 10 times in 2 hours during an active time, once a day  Choose atime of day when your baby is most active  Try to do this around the same time each day  Get into a comfortable position and then write down the time your baby first moves  Count each movement until the baby moves 10 times  These movements include kicks, punches, nudges, flutters, or rolls  This can take anywhere from 5 minutes to 2 hours  Write down the time you feel the baby's 10th movement  If 2 hours has passed and your baby has not moved at least 10 times, you should CALL THE OFFICE RIGHT AWAY  [phone]        PERINEAL / VAGINAL MASSAGE    What can I do now to decrease my chances of tearing during delivery? Massaging around the vaginal opening by you (or your partner), either antepartum (before birth) or during the second stage of labor, can reduce the likelihood of perineal tearing during childbirth    Likewise, the use of warm packs held on the perineum during the pushing stage of labor can reduce the severity of your tear  This will happen during the pushing stage of labor  At home, you can also help reduce the chances of injury that may occur during the birth of your child through perineal massage  When should I do this? Starting around or shortly after 34 weeks of pregnancy, you or your partner should provide 5-10 minutes of vaginal massage 1-4 times per week  How? Use either almond, coconut, or olive oil and water mixture on 1 or 2 fingers (depending on comfort)  Insert finger(s) 3-5cm into the vagina  Apply sweeping downward/sideward pressure from 3 to 9 o'clock for 5-10 minutes, 1-4 times per week  GROUP B STREP    Group B Strep (GBS) is a common vaginal bacteria  Approximately 25% of women normally have GBS bacteria present in the vagina  It is NOT a sexually-transmitted infection  In fact, it is not an infection AT ALL! It is just a normal vaginal bacteria for many women  However, the GBS bacteria can be dangerous to a  baby if the baby is exposed to that particular bacteria during labor and birth AND develops an infection from it  The likelihood of a  GBS infection for a woman who has GBS bacteria in the vagina is about 1%-2%  But if it does occur, a baby could become severely ill  For this reason, we do a vaginal culture (Q-tip swab of the vagina and rectum) for ALL pregnant women at approximately 36 weeks of pregnancy  If the culture shows that there is GBS bacteria present, it is NOT a reason to panic! Because in this situation we will give this woman antibiotics through her IV while she is in labor  When a mother is treated with antibiotics during labor and delivery, her baby ALMOST NEVER becomes infected with GBS bacteria  Coronavirus (SZVHK-67) pregnancy FAQs: Medical experts answer your questions  From [employer] cy  com/0_coronavirus-covid-19-pregnancy-faq-medical-gffknxh-uvzstk-tc_[email or web address]  bc (courtesy of [facility])  As of 3/18/20  By Rhonda Scott 37  Medically reviewed by Society for Maternal-Fetal Medicine       We asked parents-to-be to send us their most pressing questions about coronavirus (COVID-19)  Among them: Is it still safe to deliver in a hospital? What if my ob-gyn has the virus? We sent those questions to the top docs at the Society for Maternal-Fetal Medicine  Here are their answers  The coronavirus (COVID-19) pandemic has been declared a national emergency in the [facility] by [employer]  Moms-to-be like you are concerned about everything from getting medicines to managing disruptions at work  But above and beyond any worry about lifestyle changes is a focus on your health and the impact of COVID-19 on your pregnancy  We asked obstetrics doctors who handle the most complicated pregnancy issues to answer your questions about the coronavirus  Here are their responses, provided by Dr Mary Sanchez and her colleagues at the Society for [place] [employer]  Am I at more risk for COVID-19 if I'm pregnant? We don't know  Pregnancy does change your immune system in ways that might make you more susceptible to viral respiratory infections like COVID-19  If you become infected, you might also be at higher risk for more severe illness compared to the general population  Although this does not appear to be the case with COVID-19, based on limited data so far, a higher risk has been true for pregnant women with other coronavirus infections (SARS-CoV and MERS-CoV) and other viral respiratory infections, such as flu  It's important to take preventive actions to avoid infection, such as washing your hands often and avoiding people who are sick  How might coronavirus affect my pregnancy? Again, we don't know  Women with other coronavirus infections (such as SARS-CoV) did not have miscarriage or stillbirth at higher rates than the general population    We know that having other respiratory viral infections during pregnancy, such as flu, has been associated with problems like low birth weight and  birth  Also, having a high fever early in pregnancy may increase the risk of certain birth defects  Could I transmit coronavirus to my baby during pregnancy or delivery? We don't know whether you could transmit COVID-19 to your baby before or during delivery  However, among the few case studies of infants born to mothers with COVID-23 published in peer-reviewed literature, none of the infants tested positive for the virus  Also, there was no virus detected in samples of amniotic fluid or breast milk  There have been a few reports of newborns as young as a few days old with infection, suggesting that a mother can transmit the infection to her infant through close contact after delivery  There have been no reports of mother-to-baby transmission for other coronaviruses (MERS-CoV and SARS-CoV), although only limited information is available  Is it safe for me to deliver at a hospital where there have been COVID-19 cases? Yes  We know that COVID-19 is a very scary virus  The good news is that hospitals are taking great precautions to keep patients and healthcare providers safe  When a patient is even suspected to have COVID-19, they are placed in a negative pressure room  (Think of these rooms as vacuums that suck and filter the air so it's safe for the other people in the hospital)  This makes it possible for you to deliver at the hospital without putting you or your baby at risk  Hospitals are also implementing stricter visiting policies to keep patients safe  It's worth calling your hospital to check if there are any new regulations to be aware of  What plans should I make now in case the hospital system is overwhelmed when it's time for me to deliver? This is a great question to talk with your doctor or midwife about when you're 34 to 36 weeks pregnant   Every hospital is making different plans for dealing with this scenario  I work in healthcare  Should I ask my doctor to excuse me from work until the baby is born? What if I work in a school, the travel [place], or some other high-risk setting? Healthcare facilities should take care to limit the exposure of pregnant employees to patients with confirmed or suspected COVID-19, just as they would with other infectious cases  If you continue working, be sure to follow the CDC's risk assessment and infection control guidelines  If you work in a school, travel [place] [employer], or other high-risk setting, talk with your employer about what it's doing to protect employees and minimize infection risks  Wash your hands often  What if my OB gets COVID-19? If your doctor or midwife tests positive for COVID-19, they will need to be quarantined until they recover and are no longer at risk of transmitting the virus  In this case, you'll be assigned to another OB in your doctor's practice (or you may choose another practitioner yourself)  Ask your new OB or your doctor's office if you should self-quarantine or be tested for the virus  (It will depend on when you last saw your provider and when that person tested positive )    Should we hold off on trying to conceive because of COVID-19? At this time, there's no reason to hold off on trying to get pregnant, but the data we have is really limited  For example, we don't think the virus causes birth defects or increases your risk of miscarriage  But we don't know whether you could transmit COVID-19 to your baby before or during delivery  We also don't know if the virus lives in semen or can be sexually transmitted  We have a babymoon scheduled in the next few months - should we cancel? If you're planning to travel internationally or on a cruise, you should strongly consider canceling   At this time, the virus has reached more than 140 countries, and there are travel bans to [place], most of [place], and [place]  Places where large numbers of people gather are at highest risk, especially airports and cruise ships  If you're planning travel in the [place] , note that any travel setting increases your risk of exposure, and there may be travel bans even in [place] by the time you're scheduled to go  Even if you're state is not blocked, you'll definitely want to avoid traveling to communities where the virus is spreading  To find out where these places are, check The [place] Times map based on CDC data  For the most current advice to help you avoid exposure, check the CDC's COVID-19 travel page  Will the hospital separate me from my  and keep the baby in quarantine? If you test positive for COVID-19 or have been exposed but have no symptoms, the CDC, [employer] of Obstetricians and Gynecologists, and the Society for [place] 250 all recommend that you be  from your baby to decrease the risk of transmission to the baby  This would last until you are no longer at risk of transmitting the virus  If you do not have COVID-19 and have not been exposed to the virus, the hospital will not separate you from your   My hospital is restricting visitors and only allowing one support person  If my support person leaves after the delivery, will they be allowed to come back? Every hospital has different policies  Contact your hospital or labor and delivery unit a week or so before delivery to get the most up-to-date restrictions  In general, if your support person needs to leave, they would be allowed back unless they knew they were exposed to COVID-19 after leaving your company  [facility] understands that the coronavirus (COVID-19) pandemic is an evolving story and that your questions will change over time   We'll continue asking moms and dads in our Community what they want to know, and we'll get the answers from experts to keep them - and you - informed and supported  My mom was planning to fly here to help me care for my new baby after delivery  Should I tell her not to come? Yes  If your mom is over 61 or has any serious chronic medical conditions (such as heart disease, lung disease, or diabetes), she is at higher risk of serious illness from COVID-19 and should avoid air travel  And remember that any travel setting increases a person's risk of exposure  So, it may be risky to have her around the baby after she has been traveling  For the most current advice on traveling, check the [facility]'s COVID-19 travel page  [facility] understands that the coronavirus pandemic is an evolving story and that your questions will change over time  We'll continue asking moms and dads in our Community what they want to know, and we'll get the answers from experts to keep them - and you - informed and supported      Return Monday for NST

## 2021-03-11 NOTE — PROGRESS NOTES
Assessment & Plan  23 y o   at 36w6d presenting for routine prenatal visit  Pt presents for NST and KEITH  NST was reactive, WNL variability  KEITH was 9 bt Dr Addie Braun  Was seen in labor and delivery with CTX, PTL was R/O on 3/9/2021  WNL respiratory effort, negative cough or SOB      Problem List Items Addressed This Visit        Other    36 weeks gestation of pregnancy    Class 2 obesity without serious comorbidity with body mass index (BMI) of 38 0 to 38 9 in adult    Obesity affecting pregnancy in third trimester    Prenatal care in third trimester - Primary        ____________________________________________________________  Subjective  She is without complaint  She denies contractions, loss of fluid, or vaginal bleeding  She feels regular fetal movements      Objective  /74   Pulse 103   Ht 5' 5" (1 651 m)   Wt 114 kg (250 lb 9 6 oz)   LMP 2020   BMI 41 70 kg/m²          Patient's Active Problem List  Patient Active Problem List   Diagnosis    Class 2 obesity without serious comorbidity with body mass index (BMI) of 38 0 to 38 9 in adult    Prenatal care in third trimester    Obesity affecting pregnancy in third trimester    H/O  delivery, currently pregnant, third trimester    Maternal care for scar from previous  delivery    36 weeks gestation of pregnancy     Plan  Return Monday for NST  To call with vaginal bleeding, loss of fluid, regular contractions, decreased fetal movement, other needs or concerns  Pt verbalized understanding of all discussed

## 2021-03-15 ENCOUNTER — HOSPITAL ENCOUNTER (OUTPATIENT)
Facility: HOSPITAL | Age: 21
Discharge: HOME | End: 2021-03-15
Attending: OBSTETRICS & GYNECOLOGY | Admitting: OBSTETRICS & GYNECOLOGY
Payer: COMMERCIAL

## 2021-03-15 ENCOUNTER — OFFICE VISIT (OUTPATIENT)
Dept: OBGYN CLINIC | Facility: CLINIC | Age: 21
End: 2021-03-15

## 2021-03-15 VITALS
TEMPERATURE: 98.2 F | SYSTOLIC BLOOD PRESSURE: 116 MMHG | HEART RATE: 102 BPM | RESPIRATION RATE: 14 BRPM | WEIGHT: 253 LBS | HEIGHT: 65 IN | BODY MASS INDEX: 42.15 KG/M2 | DIASTOLIC BLOOD PRESSURE: 72 MMHG

## 2021-03-15 VITALS
HEIGHT: 65 IN | DIASTOLIC BLOOD PRESSURE: 77 MMHG | SYSTOLIC BLOOD PRESSURE: 117 MMHG | WEIGHT: 253 LBS | BODY MASS INDEX: 42.15 KG/M2 | HEART RATE: 92 BPM

## 2021-03-15 DIAGNOSIS — E66.09 CLASS 2 OBESITY DUE TO EXCESS CALORIES WITHOUT SERIOUS COMORBIDITY WITH BODY MASS INDEX (BMI) OF 38.0 TO 38.9 IN ADULT: ICD-10-CM

## 2021-03-15 DIAGNOSIS — Z34.93 PRENATAL CARE IN THIRD TRIMESTER: Primary | ICD-10-CM

## 2021-03-15 DIAGNOSIS — Z3A.37 37 WEEKS GESTATION OF PREGNANCY: ICD-10-CM

## 2021-03-15 PROCEDURE — NC001 PR NO CHARGE: Performed by: OBSTETRICS & GYNECOLOGY

## 2021-03-15 PROCEDURE — 76819 FETAL BIOPHYS PROFIL W/O NST: CPT | Performed by: NURSE PRACTITIONER

## 2021-03-15 PROCEDURE — 99212 OFFICE O/P EST SF 10 MIN: CPT

## 2021-03-15 PROCEDURE — 99215 OFFICE O/P EST HI 40 MIN: CPT | Performed by: NURSE PRACTITIONER

## 2021-03-15 NOTE — PATIENT INSTRUCTIONS
WARNING SIGNS DURING PREGNANCY  Call our office at [phone] for any of the followin  Vaginal bleeding  2  Sharp abdominal pain that does not go away  3  Fever (more than 100 4 and is not relieved by Tylenol)  4  Persistent vomiting lasting greater than 24 hours  5  Chest pain   6  Pain or burning when you urinate  7  Severe headache that doesn't resolve with Tylenol  8  Blurred vision or seeing spots in your vision  9  Sudden swelling of your face or hands  10  Redness, swelling or pain in a leg  11  A sudden weight gain in just a few days  12  Decrease in your baby's movement (after 28 weeks or the 6th month of pregnancy)  13  A loss of watery fluid from your vagina - can be a gush, a trickle or continuous wetness  14  After 20 weeks of pregnancy, rhythmic cramping (greater than 4 per hour) or menstrual like low/pelvic pain  LABOR PRECAUTIONS  Call our office at [phone] for any of the following:    * I need to call immediately I if I have even a small amount of LIQUID  leaking from my vagina, with or without contractions  * I need to call if I am BLEEDING an amount equal to or more than a period  A small amount of bloody vaginal discharge is normal at the end of the pregnancy  * I need to call if I am having CONTRACTIONS  every five minutes for at least an hour  I will need a watch in order to time them  I should time them from the beginning of one contraction until the beginning of the next one  * I need to call BEFORE  I go to the hospital    * I need to have a plan for TRANSPORTATION  to get to the hospital when I am in labor  Labor is generally not an emergency which requires an ambulance  FETAL KICK COUNTS    In the third trimester (after 28 weeks gestation) you should be performing fetal kick counts every day  Your baby should move at least 10 times in 2 hours during an active time, once a day  Choose atime of day when your baby is most active    Try to do this around the same time each day  Get into a comfortable position and then write down the time your baby first moves  Count each movement until the baby moves 10 times  These movements include kicks, punches, nudges, flutters, or rolls  This can take anywhere from 5 minutes to 2 hours  Write down the time you feel the baby's 10th movement  If 2 hours has passed and your baby has not moved at least 10 times, you should CALL THE OFFICE RIGHT AWAY  [phone]        PERINEAL / VAGINAL MASSAGE    What can I do now to decrease my chances of tearing during delivery? Massaging around the vaginal opening by you (or your partner), either antepartum (before birth) or during the second stage of labor, can reduce the likelihood of perineal tearing during childbirth  Likewise, the use of warm packs held on the perineum during the pushing stage of labor can reduce the severity of your tear  This will happen during the pushing stage of labor  At home, you can also help reduce the chances of injury that may occur during the birth of your child through perineal massage  When should I do this? Starting around or shortly after 34 weeks of pregnancy, you or your partner should provide 5-10 minutes of vaginal massage 1-4 times per week  How? Use either almond, coconut, or olive oil and water mixture on 1 or 2 fingers (depending on comfort)  Insert finger(s) 3-5cm into the vagina  Apply sweeping downward/sideward pressure from 3 to 9 o'clock for 5-10 minutes, 1-4 times per week  GROUP B STREP    Group B Strep (GBS) is a common vaginal bacteria  Approximately 25% of women normally have GBS bacteria present in the vagina  It is NOT a sexually-transmitted infection  In fact, it is not an infection AT ALL! It is just a normal vaginal bacteria for many women      However, the GBS bacteria can be dangerous to a  baby if the baby is exposed to that particular bacteria during labor and birth AND develops an infection from it  The likelihood of a  GBS infection for a woman who has GBS bacteria in the vagina is about 1%-2%  But if it does occur, a baby could become severely ill  For this reason, we do a vaginal culture (Q-tip swab of the vagina and rectum) for ALL pregnant women at approximately 36 weeks of pregnancy  If the culture shows that there is GBS bacteria present, it is NOT a reason to panic! Because in this situation we will give this woman antibiotics through her IV while she is in labor  When a mother is treated with antibiotics during labor and delivery, her baby ALMOST NEVER becomes infected with GBS bacteria        Present to L&D for extended fetal monitoring  Return in 3 days for prenatal visit, NST and KEITH

## 2021-03-15 NOTE — DISCHARGE INSTRUCTIONS
El embarazo de la semana 35 a la 38   CUIDADO AMBULATORIO:   Qué cambios están ocurriendo en cummings cuerpo: Al principio de las 37 semanas se considera que usted está en término completo  Podría ser mas fácil que usted respire si cummings bebé se ha posicionado con la maría hacia abajo  Es posible que usted necesite orinar con mayor frecuencia ya que el bebé podría estar presionando cummings vejiga  Usted también podría sentir más molestias y cansarse fácilmente  Busque atención médica de inmediato si:  · Usted presenta un jackie dolor de maría que no desaparece  · Usted tiene cambios en la visión nuevos o en aumento, romario visión borrosa o con manchas  · Usted tiene inflamación nueva o creciente en cummings breana o beth  · Usted tiene manchado o sangrado vaginal     · Usted rompe thomas o siente un chorro o gotas de agua tibia que le está bajando por cummings vagina  Comuníquese con cummings médico si:  · Usted tiene más de 5 contracciones en 1 hora  · Usted nota algún cambio en los movimientos de cummings bebé  · Usted tiene calambres, presión o tensión abdominal     · Usted tiene un cambio en la secreción vaginal     · Usted tiene escalofríos o fiebre  · Usted tiene comezón, ardor o dolor vaginal     · Usted tiene naif secreción vaginal amarillenta, verdosa, ochoa o de [employer]  · Usted tiene dolor o ardor al Marlene Lundborg, orina menos de lo habitual o tiene [place] rosada o sanguinolenta  · Usted tiene preguntas o inquietudes acerca de cummings condición o cuidado  Cómo cuidarse en esta etapa de cummings embarazo:  · Consuma alimentos saludables y variados  Alimentos saludables incluyen frutas, verduras, panes de florinda integral, alimentos lácteos bajos en grasa, frijoles, jorge luis magras y pescado  [place] líquidos romario se le haya indicado  Pregunte cuánto líquido debe rubina cada día y cuáles líquidos son los más adecuados para usted  Limite el consumo de cafeína a menos de [address]   Limite el consumo de pescado a 2 porciones cada semana  Escoja pescado con concentraciones bajas de kelly romario atún al natural enlatado, camarón, salmón, bacalao o tilapia  No coma pescado con concentraciones altas de kelly romario pez franca, caballa gigante, pargo rayado y tiburón  · [address] [place]  Cummings necesidad de ciertas vitaminas y [address], romario el ácido fólico, aumenta omid el [facility]  Las vitaminas prenatales proporcionan algunas de las vitaminas y minerales adicionales que usted necesita  Las vitaminas prenatales también podrían ayudar a disminuir el riesgo de ciertos defectos de nacimiento  · Descanse tanto romario sea necesario  Levante amanda pies si tiene inflamación en amanda tobillos y pies  · No fume  Fumar aumenta el riesgo de aborto espontáneo y otros problemas de jatinder omid cummings [facility]  Fumar puede causar que cummings bebé nazca antes de tiempo o que pese menos al nacer  Solicite información a cummings médico si usted necesita ayuda para dejar de fumar  · No consuma alcohol  El alcohol pasa de cummings cuerpo al bebé a través de la placenta  Puede afectar el desarrollo del cerebro de cummings bebé y provocar el síndrome de alcoholismo fetal (SAF)  El SAF es un marcella de condiciones que causan problemas [facility], de comportamiento y de crecimiento  · Consulte con cummings médico antes de rubina cualquier medicamento  Muchos medicamentos pueden perjudicar a cummings bebé si usted los mala [address] [place] [address]  No tome ningún medicamento, vitaminas, hierbas o suplementos sin michaela consultar con cummings [place]  use drogas ilegales o de la aranda (romario marihuana o cocaína) mientras está embarazada  · Hable con cummings médico antes de viajar  Es posible que no pueda viajar en avión después de las [address]  También le puede recomendar que evite largos viajes por carretera  Consejos de seguridad omid el embarazo:  · Evite jacuzzis y saunas   No use un jacuzzi o un sauna mientras usted está [place], especialmente omid el primer trimestre  [place]  y los saunas aumentan la temperatura de cummings bebé y el riesgo de defectos de nacimiento  · Evite la toxoplasmosis  [place] es naif infección causada por comer carne cruda o estar cerca del excremento de un dilcia infectado  [place] puede causar malformaciones congénitas, aborto espontáneo y Cullen Schein  Lávese las beth después de tocar carne cruda  Asegúrese de que la carne esté zayda cocida antes de comerla  Evite los huevos crudos y la Shiva Cousins  Use guantes o pida que alguien la ayude a limpiar la caja de arena del dilcia mientras usted Maggie Goodell  · Consulte con cummings médico acerca de viajar  El [address] cómodo para viajar es omid el shilpa trimestre  Pregunte a cummings médico si usted puede viajar después de las 36 semanas  Es posible que no pueda viajar en avión después de las 36 [address] [facility]  También le puede recomendar que evite largos viajes por carretera  Cambios que están ocurriendo con cummings bebé: Para las 38 semanas, cummings bebé podría pesar entre 6 y 5 libras  Cummings bebé podría medir alrededor de 14 pulgadas de lien desde la punta de la maría hasta la rabadilla (parte inferior del bebé)  Cummings bebé escucha lo suficientemente zayda romario para reconocer cummings voz  Conforme cummings bebé crece más, usted podría sentir menos patadas y sentir más que cummings bebé se estira y [place]  Cummings bebé podría moverse en posición con la maría hacia abajo  Cummings bebé también descansará en la parte inferior de cummings abdomen  Lo que necesita saber acerca del cuidado prenatal: Cummings médico le revisará cummings presión arterial y [place]  Es posible que también necesite lo siguiente:  · Un examen de orina también podría realizarse para revisarle el azúcar y la proteína  Estas son señales de diabetes gestacional o de infección  La proteína en cummings orina también podría ser naif señal de preeclampsia  La preeclampsia es naif condición que puede desarrollarse omid la semana 21 o después en cummings embarazo   Esta provoca presión arterial nahum y [employer] con amanda riñones y [place]  · Los análisis de carlos se pueden realizar para revisar si tiene signos de anemia (nivel bajo del ailyn)  · La vacuna Tdap podría ser recomendado por bella médico     · El examen del estreptococo del marcella B es un examen que se realiza para verificar si hay infección por estreptococos del marcella B  El estreptococo del marcella B es un tipo de bacteria que se puede encontrar en la vagina o el recto  Podría ser transmitida a bella bebé omid el parto si usted la tiene  Bella médico podría rubina [place] de bella vagina o recto y amaury la Carola Rho al laboratorio para que la examinen  · La altura uterina es naif medición del útero para controlar el desarrollo de bella bebé  [employer] por lo general es igual al número de [address] que usted tiene de embarazo  Bella médico también podría revisar la posición de bella bebé  · El ritmo cardíaco de bella bebé será revisado  © Copyright [address] Information is for End User's use only and may not be sold, redistributed or otherwise used for commercial purposes  All illustrations and images included in CareNotes® are the copyrighted property of [employer]  or [address] [place] [address] es sólo para uso en educación  Bella intención no es darle un consejo médico sobre enfermedades o tratamientos  Colsulte con bella Norah Bright farmacéutico antes de seguir cualquier régimen médico para saber si es seguro y efectivo para usted

## 2021-03-15 NOTE — PROGRESS NOTES
Triage Note - OB  Maryjoylin Rhiannon 23 y o  female MRN: [id number]  Unit/Bed#: L&D  Encounter: [id number]    OB TRIAGE NOTE  Crismaylin Rhiannon  [id number]  3/15/2021  5:01 PM  L&D /L&D     ASSESS:  23 y o  [id number] 37w3d presented for further monitoring after having an NST with minimal variability and contractions every 2-3 minutes in the office that have been more painful recently  She was previously admitted on  for  contractions and received Procardia for tocolysis  At that time her cervix was /-2  Obstetric history is significant for two prior c-sections with one being at 32 weeks for PPROM and a subsequent fetal demise at 7 months secondary to aspiration  Plan is for RLTCS at 39 weeks  PLAN  · Cervix /-2  · Will orally hydrate and recheck in 2 hours   · Discharge per night team       D/w Dr Montesinos Moment   ______________    SUBJECTIVE    JUSTINE: Estimated Date of Delivery: 21    HPI Chronology:  23 y o  [id number] 37w3d presents for further monitoring after an NST with minimal variability in the office and contractions every 2-3 minutes  She currently reports excellent fetal movement  She notes that the contractions are more painful recently  She denies vaginal bleeding or leakage of fluid  Vitals:   /72 (BP Location: Left arm)   Pulse 102   Temp 98 2 °F (36 8 °C) (Oral)   Resp 14   Ht 5' 5" (1 651 m)   Wt 115 kg (253 lb)   LMP 2020   BMI 42 10 kg/m²   Body mass index is 42 1 kg/m²  Physical Exam  Constitutional:       General: She is not in acute distress  Appearance: She is well-developed  HENT:      Head: Normocephalic and atraumatic  Pulmonary:      Effort: Pulmonary effort is normal  No respiratory distress  Abdominal:      Tenderness: There is no abdominal tenderness  There is no guarding  Comments: Gravid uterus   Genitourinary:     General: Normal vulva  Vagina: No vaginal discharge     Musculoskeletal: General: No tenderness  Skin:     General: Skin is warm and dry  Neurological:      General: No focal deficit present  Mental Status: She is alert and oriented to person, place, and time  Mental status is at baseline  Psychiatric:         Mood and Affect: Mood normal          Behavior: Behavior normal          Thought Content: Thought content normal          Judgment: Judgment normal          FHT:   Baseline 140 BPM, reactive with moderate variability and no decelerations    TOCO:   Every 5-6 minutes     Labs: No results found for this or any previous visit (from the past 24 hour(s))  Lab, Imaging and other studies: I have personally reviewed pertinent reports          Danisha Camejo MD  3/15/2021  5:01 PM

## 2021-03-15 NOTE — PROGRESS NOTES
Assessment & Plan  23 y o   at 37w3d presenting for routine prenatal visit  Pt presents for NST  Variability is < BPM, 2 acceleration at the beginning of tracing and at the end of tracing, pt states she notes very little FM  CTX are noted every 2-3 minutes, pt states minimal pain  Pt has a Hx of C/S x2  D/W Dr Jazzy Tolliver pt to present to L&D for extended FM   WNL respiratory effort, negative cough or SOB         Problem List Items Addressed This Visit        Other    37 weeks gestation of pregnancy    Class 2 obesity without serious comorbidity with body mass index (BMI) of 38 0 to 38 9 in adult    Prenatal care in third trimester - Primary        ____________________________________________________________  Subjective  She is without complaint  She denies painful contractions , loss of fluid, or vaginal bleeding  She feels minimal fetal movements      Objective  /77   Pulse 92   Ht 5' 5" (1 651 m)   Wt 115 kg (253 lb)   LMP 2020   BMI 42 10 kg/m²          Patient's Active Problem List  Patient Active Problem List   Diagnosis    Class 2 obesity without serious comorbidity with body mass index (BMI) of 38 0 to 38 9 in adult    Prenatal care in third trimester    Obesity affecting pregnancy in third trimester    H/O  delivery, currently pregnant, third trimester    Maternal care for scar from previous  delivery    37 weeks gestation of pregnancy     Plan  Present to L&D for extended FM  To call with vaginal bleeding, loss of fluid, regular contractions, decreased fetal movement, other needs or concerns  Return Thursday for PN visit, NST and KEITH or as directed in Triage  Pt verbalized understanding of all discussed

## 2021-03-16 NOTE — QUICK NOTE
Patient's repeat cervical exam was performed by me at 1940  Cervical exam was found to be unchanged at 1/90/-2, soft, midposition  Patient not in labor at this time  Will discharge home  Return precautions reviewed  Patient's aunt present for translation  Patient verbalized understanding  Plan d/w Dr Michael Roberts

## 2021-03-17 ENCOUNTER — ULTRASOUND (OUTPATIENT)
Dept: PERINATAL CARE | Facility: OTHER | Age: 21
End: 2021-03-17
Payer: COMMERCIAL

## 2021-03-17 VITALS
DIASTOLIC BLOOD PRESSURE: 74 MMHG | WEIGHT: 252.2 LBS | SYSTOLIC BLOOD PRESSURE: 113 MMHG | BODY MASS INDEX: 42.02 KG/M2 | HEIGHT: 65 IN | HEART RATE: 105 BPM

## 2021-03-17 DIAGNOSIS — O99.213 OBESITY AFFECTING PREGNANCY IN THIRD TRIMESTER: Primary | ICD-10-CM

## 2021-03-17 DIAGNOSIS — O34.211 MATERNAL CARE DUE TO LOW TRANSVERSE UTERINE SCAR FROM PREVIOUS CESAREAN DELIVERY: ICD-10-CM

## 2021-03-17 DIAGNOSIS — Z3A.37 37 WEEKS GESTATION OF PREGNANCY: ICD-10-CM

## 2021-03-17 PROCEDURE — 99213 OFFICE O/P EST LOW 20 MIN: CPT | Performed by: OBSTETRICS & GYNECOLOGY

## 2021-03-17 PROCEDURE — 76816 OB US FOLLOW-UP PER FETUS: CPT | Performed by: OBSTETRICS & GYNECOLOGY

## 2021-03-17 PROCEDURE — 1036F TOBACCO NON-USER: CPT | Performed by: OBSTETRICS & GYNECOLOGY

## 2021-03-17 NOTE — LETTER
2021     Doris Horton MD  [address]    Patient: Paulo Rollins   YOB: 2000   Date of Visit: 3/17/2021       Dear Dr Joie Issa: Thank you for referring Gus Jurado to me for evaluation  Below are my notes for this consultation  If you have questions, please do not hesitate to call me  I look forward to following your patient along with you  Sincerely,        Abhay Villaseñor MD        CC: No Recipients  Abhay Villaseñor MD  3/18/2021  9:38 AM  Sign when Signing Visit  Paulo Rollins has no complaints today  She reports regular fetal movements and does not report any problems related to hypertension, gestational diabetes,  labor, or vaginal bleeding  Her prenatal course has apparently been unremarkable in the interim since her most recent visit here  Her mother was with her today and states that she had 3 children all that weighed between 9 and almost 10 pounds  This pregnancy has a different father than her prior pregnancy  I used the [employer] line  number [id number] to  this patient and her mother  Problem list:  1  Prior  x2  2  History of a prior 32 week  delivery after P prom  Her baby had a  demise at 10 months secondary aspiration  3   Increased BMI with a normal Glucola screen    Ultrasound findings: The ultrasound today shows a fetus is in the 89th percentile primarily secondary to a cranial measurements in the greater than 95th percentile  Pregnancy ultrasound has limitations and is unable to detect all forms of fetal congenital abnormalities  The inaccuracy in the EFW can be off by 1 lb either way in the third trimester  No further follow-up  US is recommended at this time  Recommend weekly nonstress tests / fluid scan secondary to her elevated BMI        Pre visit time reviewing her records   5 minutes  Face to face time 15 minutes  Post visit time on documentation of note, updating her problem list, adding orders and prescriptions 5 minutes  Procedures that were completed today were charged separately     The level of decision making was low level complexity,     Yoan Yusuf MD

## 2021-03-17 NOTE — PROGRESS NOTES
Gus Jurado has no complaints today  She reports regular fetal movements and does not report any problems related to hypertension, gestational diabetes,  labor, or vaginal bleeding  Her prenatal course has apparently been unremarkable in the interim since her most recent visit here  Her mother was with her today and states that she had 3 children all that weighed between 9 and almost 10 pounds  This pregnancy has a different father than her prior pregnancy  I used the [employer] line  number [id number] to  this patient and her mother  Problem list:  1  Prior  x2  2  History of a prior 32 week  delivery after P prom  Her baby had a  demise at 10 months secondary aspiration  3   Increased BMI with a normal Glucola screen    Ultrasound findings: The ultrasound today shows a fetus is in the 89th percentile primarily secondary to a cranial measurements in the greater than 95th percentile  Pregnancy ultrasound has limitations and is unable to detect all forms of fetal congenital abnormalities  The inaccuracy in the EFW can be off by 1 lb either way in the third trimester  No further follow-up  US is recommended at this time  Recommend weekly nonstress tests / fluid scan secondary to her elevated BMI  Pre visit time reviewing her records   5 minutes  Face to face time 15 minutes  Post visit time on documentation of note, updating her problem list, adding orders and prescriptions 5 minutes  Procedures that were completed today were charged separately     The level of decision making was low level complexity,     Glenn Velasco MD

## 2021-03-18 ENCOUNTER — ROUTINE PRENATAL (OUTPATIENT)
Dept: OBGYN CLINIC | Facility: CLINIC | Age: 21
End: 2021-03-18

## 2021-03-18 VITALS
HEART RATE: 102 BPM | SYSTOLIC BLOOD PRESSURE: 109 MMHG | HEIGHT: 65 IN | BODY MASS INDEX: 41.95 KG/M2 | DIASTOLIC BLOOD PRESSURE: 74 MMHG | WEIGHT: 251.8 LBS

## 2021-03-18 DIAGNOSIS — O09.893 H/O PRETERM DELIVERY, CURRENTLY PREGNANT, THIRD TRIMESTER: ICD-10-CM

## 2021-03-18 DIAGNOSIS — Z98.891 HISTORY OF CESAREAN SECTION: ICD-10-CM

## 2021-03-18 DIAGNOSIS — Z3A.37 37 WEEKS GESTATION OF PREGNANCY: Primary | ICD-10-CM

## 2021-03-18 PROCEDURE — 3008F BODY MASS INDEX DOCD: CPT | Performed by: OBSTETRICS & GYNECOLOGY

## 2021-03-18 PROCEDURE — 3008F BODY MASS INDEX DOCD: CPT | Performed by: NURSE PRACTITIONER

## 2021-03-18 PROCEDURE — 99213 OFFICE O/P EST LOW 20 MIN: CPT | Performed by: OBSTETRICS & GYNECOLOGY

## 2021-03-18 NOTE — LETTER
2021     Patient: Juan Miguel Kendrick   YOB: 2000   Date of Visit: 3/18/2021       To Whom it May Concern:    Gus Jurado is under my professional care  She has a  section on 3/29/21 at 10:30 in the morning  Please excuse her partner, Alyssa Stephens from work to be with her  If you have any questions or concerns, please don't hesitate to call           Sincerely,          Goyo Mcdaniels

## 2021-03-18 NOTE — PROGRESS NOTES
OB/GYN prenatal visit    S: 23 y o  [id number] 37w6d here for PN visit  She has no  obstetric complaints, including pelvic pain, contractions, vaginal bleeding, loss of fluid, or decreased fetal movement       O:  Vitals:    21 1557   BP: 109/74   Pulse: 102     NST: 130/moderate/accelerations, no deceleratoins   Gen: no acute distress, nonlabored breathing          A/P:       IUP at 37w6d    Problem List Items Addressed This Visit        Other    H/O  delivery, currently pregnant, third trimester     On vaginal progesterone          37 weeks gestation of pregnancy - Primary     No obstetric complaints today  S/p Flu and Tdap vaccines   Contraception: IUD  Breastfeeding: likely   Return to clinic in 1 week           History of  section      section scheduled 3/29 at 10:30                Marty Benavides MD  3/18/2021  4:17 PM

## 2021-03-19 NOTE — ASSESSMENT & PLAN NOTE
No obstetric complaints today  S/p Flu and Tdap vaccines   Contraception: IUD  Breastfeeding: likely   Return to clinic in 1 week

## 2021-03-25 ENCOUNTER — ROUTINE PRENATAL (OUTPATIENT)
Dept: OBGYN CLINIC | Facility: CLINIC | Age: 21
End: 2021-03-25

## 2021-03-25 VITALS
SYSTOLIC BLOOD PRESSURE: 105 MMHG | DIASTOLIC BLOOD PRESSURE: 70 MMHG | HEART RATE: 89 BPM | BODY MASS INDEX: 42.43 KG/M2 | WEIGHT: 255 LBS

## 2021-03-25 DIAGNOSIS — E66.09 CLASS 2 OBESITY DUE TO EXCESS CALORIES WITHOUT SERIOUS COMORBIDITY WITH BODY MASS INDEX (BMI) OF 38.0 TO 38.9 IN ADULT: ICD-10-CM

## 2021-03-25 DIAGNOSIS — Z3A.38 38 WEEKS GESTATION OF PREGNANCY: ICD-10-CM

## 2021-03-25 DIAGNOSIS — Z34.93 PRENATAL CARE IN THIRD TRIMESTER: Primary | ICD-10-CM

## 2021-03-25 PROCEDURE — 1036F TOBACCO NON-USER: CPT | Performed by: NURSE PRACTITIONER

## 2021-03-25 PROCEDURE — 99215 OFFICE O/P EST HI 40 MIN: CPT | Performed by: NURSE PRACTITIONER

## 2021-03-25 NOTE — PATIENT INSTRUCTIONS
Present to labor and delivery for C/S as previously directed on Monday 3/29/2021    LABOR PRECAUTIONS  Call our office at [phone] for any of the following:    * I need to call immediately I if I have even a small amount of LIQUID  leaking from my vagina, with or without contractions  * I need to call if I am BLEEDING an amount equal to or more than a period  A small amount of bloody vaginal discharge is normal at the end of the pregnancy  * I need to call if I am having CONTRACTIONS  every five minutes for at least an hour  I will need a watch in order to time them  I should time them from the beginning of one contraction until the beginning of the next one  * I need to call BEFORE  I go to the hospital    * I need to have a plan for TRANSPORTATION  to get to the hospital when I am in labor  Labor is generally not an emergency which requires an ambulance  FETAL KICK COUNTS    In the third trimester (after 28 weeks gestation) you should be performing fetal kick counts every day  Your baby should move at least 10 times in 2 hours during an active time, once a day  Choose atime of day when your baby is most active  Try to do this around the same time each day  Get into a comfortable position and then write down the time your baby first moves  Count each movement until the baby moves 10 times  These movements include kicks, punches, nudges, flutters, or rolls  This can take anywhere from 5 minutes to 2 hours  Write down the time you feel the baby's 10th movement  If 2 hours has passed and your baby has not moved at least 10 times, you should CALL THE OFFICE RIGHT AWAY  [phone]        PERINEAL / VAGINAL MASSAGE    What can I do now to decrease my chances of tearing during delivery? Massaging around the vaginal opening by you (or your partner), either antepartum (before birth) or during the second stage of labor, can reduce the likelihood of perineal tearing during childbirth  Likewise, the use of warm packs held on the perineum during the pushing stage of labor can reduce the severity of your tear  This will happen during the pushing stage of labor  At home, you can also help reduce the chances of injury that may occur during the birth of your child through perineal massage  When should I do this? Starting around or shortly after 34 weeks of pregnancy, you or your partner should provide 5-10 minutes of vaginal massage 1-4 times per week  How? Use either almond, coconut, or olive oil and water mixture on 1 or 2 fingers (depending on comfort)  Insert finger(s) 3-5cm into the vagina  Apply sweeping downward/sideward pressure from 3 to 9 o'clock for 5-10 minutes, 1-4 times per week  GROUP B STREP    Group B Strep (GBS) is a common vaginal bacteria  Approximately 25% of women normally have GBS bacteria present in the vagina  It is NOT a sexually-transmitted infection  In fact, it is not an infection AT ALL! It is just a normal vaginal bacteria for many women  However, the GBS bacteria can be dangerous to a  baby if the baby is exposed to that particular bacteria during labor and birth AND develops an infection from it  The likelihood of a  GBS infection for a woman who has GBS bacteria in the vagina is about 1%-2%  But if it does occur, a baby could become severely ill  For this reason, we do a vaginal culture (Q-tip swab of the vagina and rectum) for ALL pregnant women at approximately 36 weeks of pregnancy  If the culture shows that there is GBS bacteria present, it is NOT a reason to panic! Because in this situation we will give this woman antibiotics through her IV while she is in labor  When a mother is treated with antibiotics during labor and delivery, her baby ALMOST NEVER becomes infected with GBS bacteria          Coronavirus (YMIUO-94) pregnancy FAQs: Medical experts answer your questions  From [employer] cy  com/0_coronavirus-covid-19-pregnancy-faq-medical-rnjnode-okqwbg-vw_63939005  bc (courtesy of [facility])  As of 3/18/20  By Rhonda Henao 37  Medically reviewed by Society for Maternal-Fetal Medicine       We asked parents-to-be to send us their most pressing questions about coronavirus (COVID-19)  Among them: Is it still safe to deliver in a hospital? What if my ob-gyn has the virus? We sent those questions to the top docs at the Society for Maternal-Fetal Medicine  Here are their answers  The coronavirus (COVID-19) pandemic has been declared a national emergency in the [facility] by [employer]  Moms-to-be like you are concerned about everything from getting medicines to managing disruptions at work  But above and beyond any worry about lifestyle changes is a focus on your health and the impact of COVID-19 on your pregnancy  We asked obstetrics doctors who handle the most complicated pregnancy issues to answer your questions about the coronavirus  Here are their responses, provided by Dr Titi Mclaughlin and her colleagues at the Society for [place] [employer]  Am I at more risk for COVID-19 if I'm pregnant? We don't know  Pregnancy does change your immune system in ways that might make you more susceptible to viral respiratory infections like COVID-19  If you become infected, you might also be at higher risk for more severe illness compared to the general population  Although this does not appear to be the case with COVID-19, based on limited data so far, a higher risk has been true for pregnant women with other coronavirus infections (SARS-CoV and MERS-CoV) and other viral respiratory infections, such as flu  It's important to take preventive actions to avoid infection, such as washing your hands often and avoiding people who are sick  How might coronavirus affect my pregnancy? Again, we don't know   Women with other coronavirus infections (such as SARS-CoV) did not have miscarriage or stillbirth at higher rates than the general population  We know that having other respiratory viral infections during pregnancy, such as flu, has been associated with problems like low birth weight and  birth  Also, having a high fever early in pregnancy may increase the risk of certain birth defects  Could I transmit coronavirus to my baby during pregnancy or delivery? We don't know whether you could transmit COVID-19 to your baby before or during delivery  However, among the few case studies of infants born to mothers with [phone] published in peer-reviewed literature, none of the infants tested positive for the virus  Also, there was no virus detected in samples of amniotic fluid or breast milk  There have been a few reports of newborns as young as a few days old with infection, suggesting that a mother can transmit the infection to her infant through close contact after delivery  There have been no reports of mother-to-baby transmission for other coronaviruses (MERS-CoV and SARS-CoV), although only limited information is available  Is it safe for me to deliver at a hospital where there have been COVID-19 cases? Yes  We know that COVID-19 is a very scary virus  The good news is that hospitals are taking great precautions to keep patients and healthcare providers safe  When a patient is even suspected to have COVID-19, they are placed in a negative pressure room  (Think of these rooms as vacuums that suck and filter the air so it's safe for the other people in the hospital)  This makes it possible for you to deliver at the hospital without putting you or your baby at risk  Hospitals are also implementing stricter visiting policies to keep patients safe  It's worth calling your hospital to check if there are any new regulations to be aware of      What plans should I make now in case the hospital system is overwhelmed when it's time for me to deliver? This is a great question to talk with your doctor or midwife about when you're 34 to 36 weeks pregnant  Every hospital is making different plans for dealing with this scenario  I work in healthcare  Should I ask my doctor to excuse me from work until the baby is born? What if I work in a school, the travel [place] [employer] [employer], or some other high-risk setting? Healthcare facilities should take care to limit the exposure of pregnant employees to patients with confirmed or suspected COVID-19, just as they would with other infectious cases  If you continue working, be sure to follow the CDC's risk assessment and infection control guidelines  If you work in a school, travel [employer] [employer], or other high-risk setting, talk with your employer about what it's doing to protect employees and minimize infection risks  Wash your hands often  What if my OB gets COVID-19? If your doctor or midwife tests positive for COVID-19, they will need to be quarantined until they recover and are no longer at risk of transmitting the virus  In this case, you'll be assigned to another OB in your doctor's practice (or you may choose another practitioner yourself)  Ask your new OB or your doctor's office if you should self-quarantine or be tested for the virus  (It will depend on when you last saw your provider and when that person tested positive )    Should we hold off on trying to conceive because of COVID-19? At this time, there's no reason to hold off on trying to get pregnant, but the data we have is really limited  For example, we don't think the virus causes birth defects or increases your risk of miscarriage  But we don't know whether you could transmit COVID-19 to your baby before or during delivery  We also don't know if the virus lives in semen or can be sexually transmitted  We have a babymoon scheduled in the next few months - should we cancel?   If you're planning to travel internationally or on a cruise, you should strongly consider canceling  At this time, the virus has reached more than 140 countries, and there are travel bans to [place], most of [place], and [place]  Places where large numbers of people gather are at highest risk, especially airports and cruise ships  If you're planning travel in the [place] , note that any travel setting increases your risk of exposure, and there may be travel bans even in [place] by the time you're scheduled to go  Even if you're state is not blocked, you'll definitely want to avoid traveling to communities where the virus is spreading  To find out where these places are, check The [place] Times map based on CDC data  For the most current advice to help you avoid exposure, check the CDC's COVID-19 travel page  Will the hospital separate me from my  and keep the baby in quarantine? If you test positive for COVID-19 or have been exposed but have no symptoms, the CDC, [employer] of Obstetricians and Gynecologists, and the Society for [place] [employer] all recommend that you be  from your baby to decrease the risk of transmission to the baby  This would last until you are no longer at risk of transmitting the virus  If you do not have COVID-19 and have not been exposed to the virus, the hospital will not separate you from your   My hospital is restricting visitors and only allowing one support person  If my support person leaves after the delivery, will they be allowed to come back? Every hospital has different policies  Contact your hospital or labor and delivery unit a week or so before delivery to get the most up-to-date restrictions  In general, if your support person needs to leave, they would be allowed back unless they knew they were exposed to COVID-19 after leaving your company  [facility] understands that the coronavirus (COVID-19) pandemic is an evolving story and that your questions will change over time   We'll continue asking moms and dads in our Community what they want to know, and we'll get the answers from experts to keep them - and you - informed and supported  My mom was planning to fly here to help me care for my new baby after delivery  Should I tell her not to come? Yes  If your mom is over 61 or has any serious chronic medical conditions (such as heart disease, lung disease, or diabetes), she is at higher risk of serious illness from COVID-19 and should avoid air travel  And remember that any travel setting increases a person's risk of exposure  So, it may be risky to have her around the baby after she has been traveling  For the most current advice on traveling, check the [facility]'s COVID-19 travel page  [facility] understands that the coronavirus pandemic is an evolving story and that your questions will change over time  We'll continue asking moms and dads in our Community what they want to know, and we'll get the answers from experts to keep them - and you - informed and supported      Return for postpartum care

## 2021-03-25 NOTE — PROGRESS NOTES
Assessment & Plan  23 y o   at 38w6d presenting for routine prenatal visit  Presents for NST which was reactive With WNL reactivity  Repeat C/S is scheduled 3/29/2021    WNL respiratory effort  Negative cough or SOB    Problem List Items Addressed This Visit        Other    38 weeks gestation of pregnancy    Class 2 obesity without serious comorbidity with body mass index (BMI) of 38 0 to 38 9 in adult    Prenatal care in third trimester - Primary        ____________________________________________________________  Subjective  She is without complaint  She denies contractions, loss of fluid, or vaginal bleeding  She feels regular fetal movements      Objective  /70   Pulse 89   Wt 116 kg (255 lb)   LMP 2020   BMI 42 43 kg/m²          Patient's Active Problem List  Patient Active Problem List   Diagnosis    Class 2 obesity without serious comorbidity with body mass index (BMI) of 38 0 to 38 9 in adult    Prenatal care in third trimester    Obesity affecting pregnancy in third trimester    H/O  delivery, currently pregnant, third trimester    Maternal care for scar from previous  delivery    38 weeks gestation of pregnancy    History of  section     Plan  Present to L&D for C/S   To call with vaginal bleeding, loss of fluid, regular contractions, decreased fetal movement, other needs or concerns  Return for postpartum care  Pt verbalized understanding of all discussed

## 2021-03-26 ENCOUNTER — ANESTHESIA EVENT (INPATIENT)
Dept: LABOR AND DELIVERY | Facility: HOSPITAL | Age: 21
DRG: 540 | End: 2021-03-26
Payer: COMMERCIAL

## 2021-03-29 ENCOUNTER — ANESTHESIA (INPATIENT)
Dept: LABOR AND DELIVERY | Facility: HOSPITAL | Age: 21
DRG: 540 | End: 2021-03-29
Payer: COMMERCIAL

## 2021-03-29 ENCOUNTER — HOSPITAL ENCOUNTER (INPATIENT)
Facility: HOSPITAL | Age: 21
LOS: 2 days | Discharge: HOME | DRG: 540 | End: 2021-03-31
Attending: OBSTETRICS & GYNECOLOGY | Admitting: OBSTETRICS & GYNECOLOGY
Payer: COMMERCIAL

## 2021-03-29 DIAGNOSIS — Z98.891 STATUS POST REPEAT LOW TRANSVERSE CESAREAN SECTION: Primary | ICD-10-CM

## 2021-03-29 DIAGNOSIS — Z30.011 ORAL CONTRACEPTION INITIATION: ICD-10-CM

## 2021-03-29 DIAGNOSIS — Z3A.38 38 WEEKS GESTATION OF PREGNANCY: ICD-10-CM

## 2021-03-29 PROBLEM — Z3A.39 39 WEEKS GESTATION OF PREGNANCY: Status: ACTIVE | Noted: 2021-02-23

## 2021-03-29 LAB
ABO GROUP BLD: NORMAL
BASE EXCESS BLDCOA CALC-SCNC: -1 MMOL/L (ref 3–11)
BASE EXCESS BLDCOV CALC-SCNC: -1.8 MMOL/L (ref 1–9)
BLD GP AB SCN SERPL QL: NEGATIVE
ERYTHROCYTE [DISTWIDTH] IN BLOOD BY AUTOMATED COUNT: 15.8 % (ref 11.6–15.1)
HCO3 BLDCOA-SCNC: 26.1 MMOL/L (ref 17.3–27.3)
HCO3 BLDCOV-SCNC: 24.2 MMOL/L (ref 12.2–28.6)
HCT VFR BLD AUTO: 36.6 % (ref 34.8–46.1)
HGB BLD-MCNC: 11.3 G/DL (ref 11.5–15.4)
MCH RBC QN AUTO: 23.7 PG (ref 26.8–34.3)
MCHC RBC AUTO-ENTMCNC: 30.9 G/DL (ref 31.4–37.4)
MCV RBC AUTO: 77 FL (ref 82–98)
O2 CT VFR BLDCOA CALC: 8.8 ML/DL
OXYHGB MFR BLDCOA: 42.1 %
OXYHGB MFR BLDCOV: 71.7 %
PCO2 BLDCOA: 53.2 MM[HG] (ref 30–60)
PCO2 BLDCOV: 45.3 MM HG (ref 27–43)
PH BLDCOA: 7.31 [PH] (ref 7.23–7.43)
PH BLDCOV: 7.34 [PH] (ref 7.19–7.49)
PLATELET # BLD AUTO: 373 THOUSANDS/UL (ref 149–390)
PMV BLD AUTO: 9 FL (ref 8.9–12.7)
PO2 BLDCOA: 17.8 MM HG (ref 5–25)
PO2 BLDCOV: 28.8 MM HG (ref 15–45)
RBC # BLD AUTO: 4.77 MILLION/UL (ref 3.81–5.12)
RH BLD: POSITIVE
SAO2 % BLDCOV: 15 ML/DL
SPECIMEN EXPIRATION DATE: NORMAL
WBC # BLD AUTO: 11.5 THOUSAND/UL (ref 4.31–10.16)

## 2021-03-29 PROCEDURE — 86850 RBC ANTIBODY SCREEN: CPT | Performed by: OBSTETRICS & GYNECOLOGY

## 2021-03-29 PROCEDURE — 4A0HXCZ MEASUREMENT OF PRODUCTS OF CONCEPTION, CARDIAC RATE, EXTERNAL APPROACH: ICD-10-PCS | Performed by: OBSTETRICS & GYNECOLOGY

## 2021-03-29 PROCEDURE — 82805 BLOOD GASES W/O2 SATURATION: CPT | Performed by: OBSTETRICS & GYNECOLOGY

## 2021-03-29 PROCEDURE — NC001 PR NO CHARGE: Performed by: OBSTETRICS & GYNECOLOGY

## 2021-03-29 PROCEDURE — 86592 SYPHILIS TEST NON-TREP QUAL: CPT | Performed by: OBSTETRICS & GYNECOLOGY

## 2021-03-29 PROCEDURE — 85027 COMPLETE CBC AUTOMATED: CPT | Performed by: OBSTETRICS & GYNECOLOGY

## 2021-03-29 PROCEDURE — 86900 BLOOD TYPING SEROLOGIC ABO: CPT | Performed by: OBSTETRICS & GYNECOLOGY

## 2021-03-29 PROCEDURE — 86901 BLOOD TYPING SEROLOGIC RH(D): CPT | Performed by: OBSTETRICS & GYNECOLOGY

## 2021-03-29 PROCEDURE — 0UN90ZZ RELEASE UTERUS, OPEN APPROACH: ICD-10-PCS | Performed by: OBSTETRICS & GYNECOLOGY

## 2021-03-29 PROCEDURE — 59514 CESAREAN DELIVERY ONLY: CPT | Performed by: OBSTETRICS & GYNECOLOGY

## 2021-03-29 PROCEDURE — 99024 POSTOP FOLLOW-UP VISIT: CPT | Performed by: OBSTETRICS & GYNECOLOGY

## 2021-03-29 RX ORDER — SODIUM CHLORIDE, SODIUM LACTATE, POTASSIUM CHLORIDE, CALCIUM CHLORIDE 600; 310; 30; 20 MG/100ML; MG/100ML; MG/100ML; MG/100ML
125 INJECTION, SOLUTION INTRAVENOUS CONTINUOUS
Status: DISCONTINUED | OUTPATIENT
Start: 2021-03-29 | End: 2021-03-31 | Stop reason: HOSPADM

## 2021-03-29 RX ORDER — OXYCODONE HYDROCHLORIDE 10 MG/1
10 TABLET ORAL EVERY 4 HOURS PRN
Status: DISCONTINUED | OUTPATIENT
Start: 2021-03-30 | End: 2021-03-31 | Stop reason: HOSPADM

## 2021-03-29 RX ORDER — CEFAZOLIN SODIUM 2 G/50ML
2000 SOLUTION INTRAVENOUS ONCE
Status: COMPLETED | OUTPATIENT
Start: 2021-03-29 | End: 2021-03-29

## 2021-03-29 RX ORDER — DIAPER,BRIEF,INFANT-TODD,DISP
1 EACH MISCELLANEOUS DAILY PRN
Status: DISCONTINUED | OUTPATIENT
Start: 2021-03-29 | End: 2021-03-31 | Stop reason: HOSPADM

## 2021-03-29 RX ORDER — DIPHENHYDRAMINE HYDROCHLORIDE 50 MG/ML
25 INJECTION INTRAMUSCULAR; INTRAVENOUS EVERY 6 HOURS PRN
Status: ACTIVE | OUTPATIENT
Start: 2021-03-29 | End: 2021-03-30

## 2021-03-29 RX ORDER — BUPIVACAINE HYDROCHLORIDE 7.5 MG/ML
INJECTION, SOLUTION INTRASPINAL AS NEEDED
Status: DISCONTINUED | OUTPATIENT
Start: 2021-03-29 | End: 2021-03-29

## 2021-03-29 RX ORDER — ONDANSETRON 2 MG/ML
4 INJECTION INTRAMUSCULAR; INTRAVENOUS EVERY 4 HOURS PRN
Status: ACTIVE | OUTPATIENT
Start: 2021-03-29 | End: 2021-03-30

## 2021-03-29 RX ORDER — ONDANSETRON 2 MG/ML
4 INJECTION INTRAMUSCULAR; INTRAVENOUS EVERY 8 HOURS PRN
Status: DISCONTINUED | OUTPATIENT
Start: 2021-03-30 | End: 2021-03-31 | Stop reason: HOSPADM

## 2021-03-29 RX ORDER — OXYCODONE HYDROCHLORIDE 5 MG/1
5 TABLET ORAL EVERY 4 HOURS PRN
Status: DISCONTINUED | OUTPATIENT
Start: 2021-03-30 | End: 2021-03-31 | Stop reason: HOSPADM

## 2021-03-29 RX ORDER — MORPHINE SULFATE 0.5 MG/ML
INJECTION, SOLUTION EPIDURAL; INTRATHECAL; INTRAVENOUS
Status: COMPLETED
Start: 2021-03-29 | End: 2021-03-29

## 2021-03-29 RX ORDER — CALCIUM CARBONATE 200(500)MG
1000 TABLET,CHEWABLE ORAL DAILY PRN
Status: DISCONTINUED | OUTPATIENT
Start: 2021-03-29 | End: 2021-03-31 | Stop reason: HOSPADM

## 2021-03-29 RX ORDER — IBUPROFEN 600 MG/1
600 TABLET ORAL EVERY 6 HOURS PRN
Status: DISCONTINUED | OUTPATIENT
Start: 2021-03-30 | End: 2021-03-31 | Stop reason: HOSPADM

## 2021-03-29 RX ORDER — KETOROLAC TROMETHAMINE 30 MG/ML
30 INJECTION, SOLUTION INTRAMUSCULAR; INTRAVENOUS EVERY 6 HOURS SCHEDULED
Status: COMPLETED | OUTPATIENT
Start: 2021-03-29 | End: 2021-03-30

## 2021-03-29 RX ORDER — ACETAMINOPHEN 325 MG/1
650 TABLET ORAL EVERY 6 HOURS PRN
Status: DISCONTINUED | OUTPATIENT
Start: 2021-03-30 | End: 2021-03-31 | Stop reason: HOSPADM

## 2021-03-29 RX ORDER — OXYTOCIN/RINGER'S LACTATE 30/500 ML
PLASTIC BAG, INJECTION (ML) INTRAVENOUS CONTINUOUS PRN
Status: DISCONTINUED | OUTPATIENT
Start: 2021-03-29 | End: 2021-03-29

## 2021-03-29 RX ORDER — ONDANSETRON 2 MG/ML
4 INJECTION INTRAMUSCULAR; INTRAVENOUS EVERY 6 HOURS PRN
Status: ACTIVE | OUTPATIENT
Start: 2021-03-29 | End: 2021-03-30

## 2021-03-29 RX ORDER — DOCUSATE SODIUM 100 MG/1
100 CAPSULE, LIQUID FILLED ORAL 2 TIMES DAILY
Status: DISCONTINUED | OUTPATIENT
Start: 2021-03-29 | End: 2021-03-31 | Stop reason: HOSPADM

## 2021-03-29 RX ORDER — OXYCODONE HYDROCHLORIDE 5 MG/1
5 TABLET ORAL EVERY 4 HOURS PRN
Status: DISPENSED | OUTPATIENT
Start: 2021-03-29 | End: 2021-03-30

## 2021-03-29 RX ORDER — OXYTOCIN/RINGER'S LACTATE 30/500 ML
62.5 PLASTIC BAG, INJECTION (ML) INTRAVENOUS CONTINUOUS
Status: DISPENSED | OUTPATIENT
Start: 2021-03-29 | End: 2021-03-29

## 2021-03-29 RX ORDER — ACETAMINOPHEN 325 MG/1
650 TABLET ORAL EVERY 6 HOURS
Status: COMPLETED | OUTPATIENT
Start: 2021-03-29 | End: 2021-03-30

## 2021-03-29 RX ORDER — EPHEDRINE SULFATE 50 MG/ML
INJECTION INTRAVENOUS AS NEEDED
Status: DISCONTINUED | OUTPATIENT
Start: 2021-03-29 | End: 2021-03-29

## 2021-03-29 RX ORDER — SODIUM CHLORIDE, SODIUM LACTATE, POTASSIUM CHLORIDE, CALCIUM CHLORIDE 600; 310; 30; 20 MG/100ML; MG/100ML; MG/100ML; MG/100ML
125 INJECTION, SOLUTION INTRAVENOUS CONTINUOUS
Status: DISCONTINUED | OUTPATIENT
Start: 2021-03-29 | End: 2021-03-29

## 2021-03-29 RX ORDER — ONDANSETRON 2 MG/ML
4 INJECTION INTRAMUSCULAR; INTRAVENOUS EVERY 8 HOURS PRN
Status: DISCONTINUED | OUTPATIENT
Start: 2021-03-29 | End: 2021-03-29

## 2021-03-29 RX ORDER — SIMETHICONE 80 MG
80 TABLET,CHEWABLE ORAL 4 TIMES DAILY PRN
Status: DISCONTINUED | OUTPATIENT
Start: 2021-03-29 | End: 2021-03-31 | Stop reason: HOSPADM

## 2021-03-29 RX ORDER — MORPHINE SULFATE 0.5 MG/ML
INJECTION, SOLUTION EPIDURAL; INTRATHECAL; INTRAVENOUS AS NEEDED
Status: DISCONTINUED | OUTPATIENT
Start: 2021-03-29 | End: 2021-03-29

## 2021-03-29 RX ORDER — PHENYLEPHRINE HCL IN 0.9% NACL 1 MG/10 ML
SYRINGE (ML) INTRAVENOUS
Status: DISPENSED
Start: 2021-03-29 | End: 2021-03-29

## 2021-03-29 RX ORDER — DIPHENHYDRAMINE HYDROCHLORIDE 50 MG/ML
25 INJECTION INTRAMUSCULAR; INTRAVENOUS EVERY 6 HOURS PRN
Status: DISCONTINUED | OUTPATIENT
Start: 2021-03-30 | End: 2021-03-31

## 2021-03-29 RX ORDER — FENTANYL CITRATE 50 UG/ML
INJECTION, SOLUTION INTRAMUSCULAR; INTRAVENOUS AS NEEDED
Status: DISCONTINUED | OUTPATIENT
Start: 2021-03-29 | End: 2021-03-29

## 2021-03-29 RX ORDER — NALOXONE HYDROCHLORIDE 0.4 MG/ML
0.1 INJECTION, SOLUTION INTRAMUSCULAR; INTRAVENOUS; SUBCUTANEOUS
Status: ACTIVE | OUTPATIENT
Start: 2021-03-29 | End: 2021-03-30

## 2021-03-29 RX ORDER — OXYCODONE HYDROCHLORIDE 5 MG/1
10 TABLET ORAL EVERY 6 HOURS PRN
Status: ACTIVE | OUTPATIENT
Start: 2021-03-29 | End: 2021-03-30

## 2021-03-29 RX ORDER — FENTANYL CITRATE 50 UG/ML
INJECTION, SOLUTION INTRAMUSCULAR; INTRAVENOUS
Status: COMPLETED
Start: 2021-03-29 | End: 2021-03-29

## 2021-03-29 RX ADMIN — FENTANYL CITRATE 10 MCG: 50 INJECTION, SOLUTION INTRAMUSCULAR; INTRAVENOUS at 10:54

## 2021-03-29 RX ADMIN — SODIUM CHLORIDE, SODIUM LACTATE, POTASSIUM CHLORIDE, AND CALCIUM CHLORIDE 125 ML/HR: .6; .31; .03; .02 INJECTION, SOLUTION INTRAVENOUS at 10:28

## 2021-03-29 RX ADMIN — Medication 250 MILLI-UNITS/MIN: at 11:26

## 2021-03-29 RX ADMIN — BUPIVACAINE HYDROCHLORIDE IN DEXTROSE 1.6 ML: 7.5 INJECTION, SOLUTION SUBARACHNOID at 10:54

## 2021-03-29 RX ADMIN — ACETAMINOPHEN 650 MG: 325 TABLET, FILM COATED ORAL at 20:39

## 2021-03-29 RX ADMIN — PHENYLEPHRINE HYDROCHLORIDE 100 MCG: 10 INJECTION INTRAVENOUS at 10:58

## 2021-03-29 RX ADMIN — MORPHINE SULFATE 0.15 MG: 0.5 INJECTION, SOLUTION EPIDURAL; INTRATHECAL; INTRAVENOUS at 10:54

## 2021-03-29 RX ADMIN — ACETAMINOPHEN 650 MG: 325 TABLET, FILM COATED ORAL at 13:59

## 2021-03-29 RX ADMIN — DOCUSATE SODIUM 100 MG: 100 CAPSULE, LIQUID FILLED ORAL at 17:34

## 2021-03-29 RX ADMIN — SODIUM CHLORIDE, SODIUM LACTATE, POTASSIUM CHLORIDE, AND CALCIUM CHLORIDE 125 ML/HR: .6; .31; .03; .02 INJECTION, SOLUTION INTRAVENOUS at 09:15

## 2021-03-29 RX ADMIN — SODIUM CHLORIDE, SODIUM LACTATE, POTASSIUM CHLORIDE, AND CALCIUM CHLORIDE 125 ML/HR: .6; .31; .03; .02 INJECTION, SOLUTION INTRAVENOUS at 09:52

## 2021-03-29 RX ADMIN — PHENYLEPHRINE HYDROCHLORIDE 50 MCG: 10 INJECTION INTRAVENOUS at 10:57

## 2021-03-29 RX ADMIN — SODIUM CHLORIDE, SODIUM LACTATE, POTASSIUM CHLORIDE, AND CALCIUM CHLORIDE: .6; .31; .03; .02 INJECTION, SOLUTION INTRAVENOUS at 11:44

## 2021-03-29 RX ADMIN — Medication 62.5 MILLI-UNITS/MIN: at 13:03

## 2021-03-29 RX ADMIN — SODIUM CHLORIDE, SODIUM LACTATE, POTASSIUM CHLORIDE, AND CALCIUM CHLORIDE 125 ML/HR: .6; .31; .03; .02 INJECTION, SOLUTION INTRAVENOUS at 15:33

## 2021-03-29 RX ADMIN — EPHEDRINE SULFATE 10 MG: 50 INJECTION, SOLUTION INTRAVENOUS at 11:03

## 2021-03-29 RX ADMIN — CEFAZOLIN SODIUM 2000 MG: 2 SOLUTION INTRAVENOUS at 10:39

## 2021-03-29 RX ADMIN — ONDANSETRON 4 MG: 2 INJECTION INTRAMUSCULAR; INTRAVENOUS at 10:58

## 2021-03-29 RX ADMIN — DOCUSATE SODIUM 100 MG: 100 CAPSULE, LIQUID FILLED ORAL at 13:59

## 2021-03-29 RX ADMIN — KETOROLAC TROMETHAMINE 30 MG: 30 INJECTION, SOLUTION INTRAMUSCULAR; INTRAVENOUS at 11:49

## 2021-03-29 RX ADMIN — KETOROLAC TROMETHAMINE 30 MG: 30 INJECTION, SOLUTION INTRAMUSCULAR; INTRAVENOUS at 17:33

## 2021-03-29 RX ADMIN — PHENYLEPHRINE HYDROCHLORIDE 100 MCG: 10 INJECTION INTRAVENOUS at 11:17

## 2021-03-29 NOTE — ANESTHESIA PREPROCEDURE EVALUATION
Procedure:   SECTION () REPEAT (N/A Uterus)    Relevant Problems   ANESTHESIA (within normal limits)      CARDIO (within normal limits)      ENDO  BMI 42      GI/HEPATIC (within normal limits)      /RENAL (within normal limits)      GYN  prev c section   (+) 39 weeks gestation of pregnancy   (+) H/O  delivery, currently pregnant, third trimester      HEMATOLOGY (within normal limits)      MUSCULOSKELETAL (within normal limits)      NEURO/PSYCH (within normal limits)      PULMONARY (within normal limits)        Physical Exam    Airway    Mallampati score: II  TM Distance: >3 FB  Neck ROM: full     Dental   No notable dental hx     Cardiovascular  Rhythm: regular, Rate: normal, Cardiovascular exam normal    Pulmonary  Pulmonary exam normal Breath sounds clear to auscultation,     Other Findings        Anesthesia Plan  ASA Score- 2     Anesthesia Type- spinal with ASA Monitors  Additional Monitors:   Airway Plan:           Plan Factors-    Chart reviewed  Existing labs reviewed  Patient summary reviewed  Patient is not a current smoker  Induction-     Postoperative Plan-     Informed Consent- Anesthetic plan and risks discussed with patient

## 2021-03-29 NOTE — ANESTHESIA PROCEDURE NOTES
Spinal Block    Patient location during procedure: OB  Start time: 3/29/2021 10:54 AM  Reason for block: primary anesthetic  Staffing  Anesthesiologist: Ace Dominique DO  Performed: anesthesiologist   Preanesthetic Checklist  Completed: patient identified, site marked, surgical consent, pre-op evaluation, timeout performed, IV checked, risks and benefits discussed and monitors and equipment checked  Spinal Block  Patient position: sitting  Prep: Betadine  Patient monitoring: heart rate, continuous pulse ox and frequent blood pressure checks  Approach: midline  Location: L3-4  Injection technique: single-shot  Needle  Needle type: pencil-tip   Needle gauge: 24 G  Needle length: 5 cm  Assessment  Injection Assessment:  positive aspiration for clear CSF    Post-procedure:  site cleaned

## 2021-03-29 NOTE — H&P
[address] Shelli Rhiannon 23 y o  female MRN: [id number]  Unit/Bed#: L&D 329-02 Encounter: [id number]      Assessment: 23 y o   at 39w3d admitted for repeat low transverse  section  Clinical EFW:  89%ile, 8 5lbs  GBS status:  Negative   Postpartum contraception plan: Desires IUD at 6 weeks postpartum visit        Plan:   · Admit  · CBC, RPR, Type & Screen  · Proceed to OR for RLTCS  · Ancef 2 g ordered    Discussed with Dr Edmar Vo  SUBJECTIVE:    Chief Complaint:  Encounter for repeat low transverse  section    HPI: Velvet Pulido is a 23 y o  [id number] with an JUSTINE of 2021, by Last Menstrual Period at 39w3d who is being admitted for a RLTCS  She is feeling well overall  She's had 2 prior c-sections with the first one being complicated by PPROM at 32 weeks  That baby had a fetal demise at 10 months for aspiration  She then had a term, uncomplicated RLTCS  Pregnancy complications:  Obesity, history of  x2, H/o fetal demise at 7 months    Patient Active Problem List   Diagnosis    Class 2 obesity without serious comorbidity with body mass index (BMI) of 38 0 to 38 9 in adult    Prenatal care in third trimester    Obesity affecting pregnancy in third trimester    H/O  delivery, currently pregnant, third trimester    Maternal care for scar from previous  delivery    39 weeks gestation of pregnancy    History of  section       Baby complications/comments: none    Review of Systems   Constitutional: Negative for chills and fever  Eyes: Negative for visual disturbance  Respiratory: Negative for cough and shortness of breath  Cardiovascular: Negative for chest pain, palpitations and leg swelling  Gastrointestinal: Negative for abdominal pain, diarrhea, nausea and vomiting  Genitourinary: Negative for dysuria, hematuria, pelvic pain, vaginal bleeding, vaginal discharge and vaginal pain     Neurological: Negative for dizziness, light-headedness and headaches  OB History    Para Term  AB Living   3 2 1 1   1   SAB TAB Ectopic Multiple Live Births           2      # Outcome Date GA Lbr Tomasz/2nd Weight Sex Delivery Anes PTL Lv   3 Current            2 Term  40w0d  2948 g (6 lb 8 oz) F CS-Unspec  N ALKA   1   32w0d    CS-Unspec   ND      Birth Comments: PPROM       No past medical history on file  Past Surgical History:   Procedure Laterality Date     SECTION      2       Social History     Tobacco Use    Smoking status: Never Smoker    Smokeless tobacco: Never Used   Substance Use Topics    Alcohol use: No       No Known Allergies    Medications Prior to Admission   Medication    aspirin (ECOTRIN LOW STRENGTH) 81 mg EC tablet    calcium carbonate (TUMS) 500 mg chewable tablet    doxylamine (UNISON) 25 MG tablet    ferrous sulfate 324 (65 Fe) mg    Prenatal Vit-Fe Fumarate-FA (Prenatal/Folic Acid) TABS    Pyridoxine HCl (vitamin B-6) 25 MG tablet       OBJECTIVE:  Vitals:  Temp:  [98 8 °F (37 1 °C)] 98 8 °F (37 1 °C)  HR:  [103] 103  Resp:  [18] 18  BP: (110)/(62) 110/62  Body mass index is 42 1 kg/m²  Physical Exam:  Physical Exam  Constitutional:       General: She is not in acute distress  Appearance: She is not toxic-appearing or diaphoretic  HENT:      Head: Normocephalic and atraumatic  Cardiovascular:      Rate and Rhythm: Normal rate  Heart sounds: Normal heart sounds  No murmur  Pulmonary:      Effort: Pulmonary effort is normal  No respiratory distress  Breath sounds: No wheezing or rales  Abdominal:      Comments: Gravid uterus   Neurological:      General: No focal deficit present  Mental Status: She is alert and oriented to person, place, and time  Mental status is at baseline  Skin:     General: Skin is warm and dry  Psychiatric:         Mood and Affect: Mood normal          Thought Content:  Thought content normal  Judgment: Judgment normal         FHT: 130 BPM, reactive with moderate variability and no decelerations    TOCO:    None     Lab Results   Component Value Date    WBC 11 50 (H) 03/29/2021    HGB 11 3 (L) 03/29/2021    HCT 36 6 03/29/2021     03/29/2021     Lab Results   Component Value Date    K 3 7 06/12/2020     06/12/2020    CO2 23 06/12/2020    BUN 15 06/12/2020    CREATININE 0 76 06/12/2020    AST 33 06/12/2020    ALT 25 06/12/2020       Prenatal Labs: Reviewed      Prenatal Labs:   Blood Type:   Lab Results   Component Value Date/Time    ABO Grouping O 02/22/2021 09:18 PM     D (Rh type):   Lab Results   Component Value Date/Time    Rh Factor Positive 02/22/2021 09:18 PM     HCT/HGB:   Lab Results   Component Value Date/Time    Hematocrit 36 6 03/29/2021 09:06 AM    Hemoglobin 11 3 (L) 03/29/2021 09:06 AM     Platelets:   Lab Results   Component Value Date/Time    Platelets 494 01/18/0681 09:06 AM     1 hour Glucola:   Lab Results   Component Value Date/Time    Glucose 107 01/07/2021 04:27 PM     Rubella:   Lab Results   Component Value Date/Time    Rubella IgG Quant >175 0 09/15/2020 03:19 PM     VDRL/RPR:   Lab Results   Component Value Date/Time    RPR Non-Reactive 02/22/2021 09:18 PM     Urine Culture/Screen:   Lab Results   Component Value Date/Time    Urine Culture <10,000 cfu/ml  09/15/2020 03:19 PM     Urine Drug Screen:   Lab Results   Component Value Date/Time    Barbiturate Ur Negative 02/22/2021 09:18 PM    Benzodiazepine Urine Negative 02/22/2021 09:18 PM    THC Urine Negative 02/22/2021 09:18 PM    Cocaine Urine Negative 02/22/2021 09:18 PM    Methadone Urine Negative 02/22/2021 09:18 PM    Opiate Urine Negative 02/22/2021 09:18 PM    PCP Ur Negative 02/22/2021 09:18 PM     Hep B:   Lab Results   Component Value Date/Time    Hepatitis B Surface Ag Non-reactive 09/15/2020 03:19 PM     HIV:   Lab Results   Component Value Date/Time    HIV-1/HIV-2 Ab Non-Reactive 09/15/2020 03:19 PM Gonorrhea:   Lab Results   Component Value Date/Time    N gonorrhoeae, DNA Probe Negative 09/16/2020 02:06 PM     Group B Strep:    Lab Results   Component Value Date/Time    Strep Grp B PCR Negative 02/22/2021 10:36 PM          >2 Midnights  INPATIENT       3/29/2021  10:13 AM

## 2021-03-29 NOTE — ANESTHESIA POSTPROCEDURE EVALUATION
Post-Op Assessment Note    CV Status:  Stable  Pain Score: 0    Pain management: adequate     Mental Status:  Alert and awake   Hydration Status:  Euvolemic   PONV Controlled:  Controlled   Airway Patency:  Patent      Post Op Vitals Reviewed: Yes      Staff: Anesthesiologist         No complications documented      BP      Temp      Pulse     Resp      SpO2 99 % (03/29/21 1434)

## 2021-03-29 NOTE — DISCHARGE SUMMARY
Discharge Summary - OB/GYN   Crismaylin Rhiannon 23 y o  female MRN: [id number]  Unit/Bed#: L&D 780-49 Encounter: [id number]      Admission Date: 3/29/2021     Discharge Date: 3/31/21    Admitting Diagnosis:   1  Pregnancy at 39w3d  2  Obesity, BMI 42  3  Prior  x2  4  H/o fetal demise     Discharge Diagnosis:   Same, delivered    Procedures: repeat  section, low transverse incision    Admitting and Delivery Attending: Hunter Broussard MD  Discharge Attending: Dr Duncan Stover Course:     Emily Allred is a 23 y o   at 39w3d wks who was initially admitted for a RLTCS  She delivered a viable male  on 21 at 9850 1142  Weight 8lbs 15 6oz via repeat  section, low transverse incision  Apgars were 8 (1 min) and 9 (5 min)   was transferred to  nursery  Patient tolerated the procedure well and was transferred to recovery in stable condition  Her post-operative course was uncomplicated  Preoperative hemaglobin was 11 3, postoperative was 9 5  Her postoperative pain was well controlled with oral analgesics  On day of discharge, she was ambulating and able to reasonably perform all ADLs  She was voiding and had appropriate bowel function  Pain was well controlled  She was discharged home on post-operative day #2 without complications  Patient was instructed to follow up with her OB as an outpatient and was given appropriate warnings to call provider if she develops signs of infection or uncontrolled pain, and to return in 1 week for incision check  Complications: none apparent    Condition at discharge: good     Discharge instructions/Information to patient and family:   See after visit summary for information provided to patient and family  Provisions for Follow-Up Care:  See after visit summary for information related to follow-up care and any pertinent home health orders        Disposition: Home    Planned Readmission: No    Discharge Medications: For a complete list of the patient's medications, please refer to her med rec  Contraception: Micronor  Analgesics: 6 tablets of oxycodone 5mg

## 2021-03-29 NOTE — QUICK NOTE
Post Op Check - OB/GYN  Crismaylin Rhiannon 23 y o  female MRN: [id number]  Unit/Bed#: L&D 311-01 Encounter: [id number]      Assessment:  Postop Day #0 s/p RLTCS  Patient is doing well, reports that pain is well-controlled  Denies chest pain or SOB  She is not yet passing flatus  VSS, UOP 0 7 cc/kg/hr  Vitals:   /69 (BP Location: Right arm)   Pulse 90   Temp 98 3 °F (36 8 °C) (Oral)   Resp 20   Ht 5' 5" (1 651 m)   Wt 115 kg (253 lb) Comment: before pregnancy 210lbs  LMP 06/26/2020   SpO2 98%   Breastfeeding Yes   BMI 42 10 kg/m²       Intake/Output Summary (Last 24 hours) at 3/29/2021 1655  Last data filed at 3/29/2021 1255  Gross per 24 hour   Intake 1200 ml   Output 510 ml   Net 690 ml       Invasive Devices     Peripheral Intravenous Line            Peripheral IV 03/29/21 Dorsal (posterior); Left Forearm less than 1 day          Drain            Urethral Catheter less than 1 day                Physical Exam:   GEN: Crismaylin Rhiannon appears well, alert and oriented x 3, pleasant and cooperative   ABDOMEN: soft, no tenderness, no distention, fundus firm, 2 cm below umbilicus, Incision C/D/I  EXTREMITIES: SCDs on      Labs:   Admission on 03/29/2021   Component Date Value    WBC 03/29/2021 11 50*    RBC 03/29/2021 4 77     Hemoglobin 03/29/2021 11 3*    Hematocrit 03/29/2021 36 6     MCV 03/29/2021 77*    MCH 03/29/2021 23 7*    MCHC 03/29/2021 30 9*    RDW 03/29/2021 15 8*    Platelets 52/42/7385 373     MPV 03/29/2021 9 0     ABO Grouping 03/29/2021 O     Rh Factor 03/29/2021 Positive     Antibody Screen 03/29/2021 Negative     Specimen Expiration Date 03/29/2021 [id number]     pH, Cord Eleazar 03/29/2021 7 345     pCO2, Cord Eleazar 03/29/2021 45 3*    pO2, Cord Eleazar 03/29/2021 28 8     HCO3, Cord Eleazar 03/29/2021 24 2     Base Exc, Cord Eleazar 03/29/2021 -1 8*    O2 Cont, Cord Eleazar 03/29/2021 15 0     O2 HGB,VENOUS CORD 03/29/2021 71 7     pH, Cord Art 03/29/2021 7 309     pCO2, Cord Art 2021 53 2     pO2, Cord Art 2021 17 8     HCO3, Cord Art 2021 26 1     Base Exc, Cord Art 2021 -1 0*    O2 Content, Cord Art 2021 8 8     O2 Hgb, Arterial Cord 2021 42 1          Patient Active Problem List   Diagnosis    Class 2 obesity without serious comorbidity with body mass index (BMI) of 38 0 to 38 9 in adult    Prenatal care in third trimester    Obesity affecting pregnancy in third trimester    H/O  delivery, currently pregnant, third trimester    Maternal care for scar from previous  delivery    39 weeks gestation of pregnancy    History of  section    Status post repeat low transverse  section          Mariposa Cardoza MD  3/29/2021  4:55 PM

## 2021-03-29 NOTE — LACTATION NOTE
This note was copied from a baby's chart  Met with mother  Provided mother with Setswana Ready, Set, Baby booklet  Family member interpreted for mom  I offered bernardo , but she did not want that  Discussed Skin to Skin contact an benefits to mom and baby  Talked about the delay of the first bath until baby has adjusted  Spoke about the benefits of rooming in  Feeding on cue and what that means for recognizing infant's hunger  Avoidance of pacifiers for the first month discussed  Talked about exclusive breastfeeding for the first 6 months  Positioning and latch reviewed as well as showing images of other feeding positions  Discussed the properties of a good latch in any position  Reviewed hand/manual expression  Discussed s/s that baby is getting enough milk and some s/s that breastfeeding dyad may need further help  Gave information on common concerns, what to expect the first few weeks after delivery, preparing for other caregivers, and how partners can help  Resources for support also provided  Mother verbalized breastfeeding is going well  Enc to call for assistance as needed,phone # given

## 2021-03-29 NOTE — PLAN OF CARE
Problem: POSTPARTUM  Goal: Experiences normal postpartum course  Description: INTERVENTIONS:  - Monitor maternal vital signs  - Assess uterine involution and lochia  Outcome: Progressing  Goal: Appropriate maternal -  bonding  Description: INTERVENTIONS:  - Identify family support  - Assess for appropriate maternal/infant bonding   -Encourage maternal/infant bonding opportunities  - Referral to  or  as needed  Outcome: Progressing  Goal: Establishment of infant feeding pattern  Description: INTERVENTIONS:  - Assess breast/bottle feeding  - Refer to lactation as needed  Outcome: Progressing  Goal: Incision(s), wounds(s) or drain site(s) healing without S/S of infection  Description: INTERVENTIONS  - Assess and document risk factors for skin impairment   - Assess and document dressing, incision, wound bed, drain sites and surrounding tissue  - Consider nutrition services referral as needed  - Oral mucous membranes remain intact  - Provide patient/ family education  Outcome: Progressing     Problem: PAIN - ADULT  Goal: Verbalizes/displays adequate comfort level or baseline comfort level  Description: Interventions:  - Encourage patient to monitor pain and request assistance  - Assess pain using appropriate pain scale  - Administer analgesics based on type and severity of pain and evaluate response  - Implement non-pharmacological measures as appropriate and evaluate response  - Consider cultural and social influences on pain and pain management  - Notify physician/advanced practitioner if interventions unsuccessful or patient reports new pain  Outcome: Progressing     Problem: INFECTION - ADULT  Goal: Absence or prevention of progression during hospitalization  Description: INTERVENTIONS:  - Assess and monitor for signs and symptoms of infection  - Monitor lab/diagnostic results  - Monitor all insertion sites, i e  indwelling lines, tubes, and drains  - Monitor endotracheal if appropriate and nasal secretions for changes in amount and color  - [place]ascade appropriate cooling/warming therapies per order  - Administer medications as ordered  - Instruct and encourage patient and family to use good hand hygiene technique  - Identify and instruct in appropriate isolation precautions for identified infection/condition  Outcome: Progressing  Goal: Absence of fever/infection during neutropenic period  Description: INTERVENTIONS:  - Monitor WBC    Outcome: Progressing     Problem: SAFETY ADULT  Goal: Patient will remain free of falls  Description: INTERVENTIONS:  - Assess patient frequently for physical needs  -  Identify cognitive and physical deficits and behaviors that affect risk of falls    -  Cascade fall precautions as indicated by assessment   - Educate patient/family on patient safety including physical limitations  - Instruct patient to call for assistance with activity based on assessment  - Modify environment to reduce risk of injury  - Consider OT/PT consult to assist with strengthening/mobility  Outcome: Progressing  Goal: Maintain or return to baseline ADL function  Description: INTERVENTIONS:  -  Assess patient's ability to carry out ADLs; assess patient's baseline for ADL function and identify physical deficits which impact ability to perform ADLs (bathing, care of mouth/teeth, toileting, grooming, dressing, etc )  - Assess/evaluate cause of self-care deficits   - Assess range of motion  - Assess patient's mobility; develop plan if impaired  - Assess patient's need for assistive devices and provide as appropriate  - Encourage maximum independence but intervene and supervise when necessary  - Involve family in performance of ADLs  - Assess for home care needs following discharge   - Consider OT consult to assist with ADL evaluation and planning for discharge  - Provide patient education as appropriate  Outcome: Progressing  Goal: Maintain or return mobility status to optimal level  Description: INTERVENTIONS:  - Assess patient's baseline mobility status (ambulation, transfers, stairs, etc )    - Identify cognitive and physical deficits and behaviors that affect mobility  - Identify mobility aids required to assist with transfers and/or ambulation (gait belt, sit-to-stand, lift, walker, cane, etc )  - [place] fall precautions as indicated by assessment  - Record patient progress and toleration of activity level on Mobility SBAR; progress patient to next Phase/Stage  - Instruct patient to call for assistance with activity based on assessment  - Consider rehabilitation consult to assist with strengthening/weightbearing, etc   Outcome: Progressing     Problem: Knowledge Deficit  Goal: Patient/family/caregiver demonstrates understanding of disease process, treatment plan, medications, and discharge instructions  Description: Complete learning assessment and assess knowledge base    Interventions:  - Provide teaching at level of understanding  - Provide teaching via preferred learning methods  Outcome: Progressing     Problem: DISCHARGE PLANNING  Goal: Discharge to home or other facility with appropriate resources  Description: INTERVENTIONS:  - Identify barriers to discharge w/patient and caregiver  - Arrange for needed discharge resources and transportation as appropriate  - Identify discharge learning needs (meds, wound care, etc )  - Arrange for interpretive services to assist at discharge as needed  - Refer to Case Management Department for coordinating discharge planning if the patient needs post-hospital services based on physician/advanced practitioner order or complex needs related to functional status, cognitive ability, or social support system  Outcome: Progressing

## 2021-03-29 NOTE — OP NOTE
OPERATIVE REPORT  PATIENT NAME: Gus Doddeal    :  2000  MRN: [id number]  Pt Location: [facility] L&D OR ROOM 01    SURGERY DATE: 3/29/2021    Surgeon(s) and Role:     * Carol Rojas MD - Primary     * Alease Goodell, MD - Assisting      Procedure(s) (LRB):   SECTION () REPEAT (N/A)    Specimen(s):  ID Type Source Tests Collected by Time Destination   A :  Tissue (Placenta on Hold) OB Only Placenta PLACENTA IN STORAGE Carol Rojas MD 3/29/2021 1128    B :  Cord Blood Cord BLOOD GAS, VENOUS, CORD Carol Rojas MD 3/29/2021 1126    C :  Cord Blood Cord BLOOD GAS, ARTERIAL, CORD Carol Rojas MD 3/29/2021 1126        Drains:  Urethral Catheter (Active)   Site Assessment Clean;Skin intact 21 1245   Collection Container Standard drainage bag 21 1245   Number of days: 0     Operative Report - OB/GYN   Maryjoylin Rhiannon 23 y o  female MRN: [id number]  Unit/Bed#: L&D 311-01 Encounter: [id number]    Indications: RLTCS    Pre-operative Diagnosis:   1  39 week 3 day pregnancy  2  Obesity, BMI 42  3  Prior  x2  4  H/o fetal demise     Post-operative Diagnosis: same, delivered    Surgeon: Carol Rojas MD    Assistant(s): Chastity Marte MD    Findings:  1  Delivery of viable male at 1126, weight 8lbs 15 6oz;  Apgar scores of 8 at one minute and 9 at five minutes  2  Normal appearing placenta with centrally-inserted 3 vessel cord  3  Clear amniotic fluid  4  Grossly normal uterus, tubes, and ovaries  5  Dense fascial adhesions   6  Thin lower uterine segment with adherent bladder    Specimens:   1  Arterial and venous cord gases  2  Cord blood  3  Segment of umbilical cord  4  Placenta to storage     Quantified Blood Loss:  385 mL           Drains: Mancini catheter           Complications:  None; patient tolerated the procedure well             Disposition: PACU            Condition: stable    Procedure Details   Decision was made to proceed with a repeat  section  Patient was made aware of these findings and the proposed plan  Risks, benefits, possible complications, alternate treatment options, and expected outcomes were discussed with the patient  The patient agreed with the proposed plan and gave informed consent  The patient was taken to the operating room where she was properly identified to the OR staff and attending physician  She received spinal anesthesia preoperatively  Fetal heart tones were appreciated and found to be appropriate  A Mancini catheter was aseptically inserted and SCDs were placed  The abdomen was prepped with Chloraprep and following appropriate drying time, the patient was draped in the usual sterile manner for a Pfannenstiel incision  The patient had received Ancef 2g IV pre-operatively for prophylaxis  A Time Out was held and the above information confirmed  The patient was identified as Crismaylin Rhiannon and the procedure verified as  Delivery  A Pfannenstiel incision was made and carried down through the underlying subcutaneous tissue to the fascia using a scalpel  Rectus fascia was then incised in the midline and extended laterally using Garza scissors  The superior edge of the fascia was grasped with Kocher clamps, tented upward, and the underlying muscle was bluntly and sharply dissected off  The inferior edge was grasped with Kocher clamps and cleared in similar fashion  All anatomic layers were well-demarcated  The rectus muscles were  and the peritoneum was identified, grasped with two Allis clamps, and subsequently entered  The peritoneum and rectus muscle was further extended using Bovie electrocautery  The peritoneum was then extended longitudinally with blunt dissection  The vesicouterine peritoneum was identified and the bladder blade was inserted        A low transverse uterine incision was made with the scalpel and extended in a cephalocaudad direction with blunt dissection  The amnion was entered bluntly  Surgeon's hand was inserted through the hysterotomy and the fetal head was palpated, elevated, and delivered through the uterine incision with the assistance of fundal pressure  Baby had spontaneous cry with good color and tone  The umbilical cord was clamped and cut  The infant was handed off to the  providers  Arterial and venous cord gases, cord blood, and a segment of umbilical cord were obtained for evaluation and promptly sent to the lab  The placenta delivered spontaneously with uterine fundal massage and was noted to have a centrally inserted 3 vessel cord  This was also sent to the lab for placental pathology  The uterus was exteriorized and a moist lap sponge was used to clear the cavity of clots and products of conception  The uterine incision was closed with a running locked suture of 0 Vicryl  The lower uterine segment was noted to be very thin and adherent to the bladder  The bladder was carefully taken down by making a bladder flap with Metzenbaum scissors  A second layer of the same suture was then used to imbricate the first  Good hemostasis was confirmed upon uterine closure  Moist lap sponges were used to clear the posterior culdesac and the uterus was returned to the abdomen  The paracolic gutters were inspected and cleared of all clots and debris with moist lap sponges  The fascia was closed with a running suture of 0 Vicryl  Subcutaneous tissues were closed with 3-0 Plain Gut suture  The skin was closed with 4-0 Monocryl in a subcuticular fashion and Exofin glue was applied  At the conclusion of the procedure, all needle, sponge, and instrument counts were noted to be correct x2  The patient tolerated the procedure well and was transferred to her the recovery room in stable condition  Dr Demetra Lugo was present and participated in all key portions of the case          SIGNATURE: Dante Harley MD  DATE:  2021  TIME: 12:56 PM

## 2021-03-30 LAB
BASOPHILS # BLD AUTO: 0.02 THOUSANDS/ΜL (ref 0–0.1)
BASOPHILS NFR BLD AUTO: 0 % (ref 0–1)
EOSINOPHIL # BLD AUTO: 0.11 THOUSAND/ΜL (ref 0–0.61)
EOSINOPHIL NFR BLD AUTO: 1 % (ref 0–6)
ERYTHROCYTE [DISTWIDTH] IN BLOOD BY AUTOMATED COUNT: 15.9 % (ref 11.6–15.1)
HCT VFR BLD AUTO: 30.5 % (ref 34.8–46.1)
HGB BLD-MCNC: 9.5 G/DL (ref 11.5–15.4)
IMM GRANULOCYTES # BLD AUTO: 0.11 THOUSAND/UL (ref 0–0.2)
IMM GRANULOCYTES NFR BLD AUTO: 1 % (ref 0–2)
LYMPHOCYTES # BLD AUTO: 2.14 THOUSANDS/ΜL (ref 0.6–4.47)
LYMPHOCYTES NFR BLD AUTO: 22 % (ref 14–44)
MCH RBC QN AUTO: 24.4 PG (ref 26.8–34.3)
MCHC RBC AUTO-ENTMCNC: 31.1 G/DL (ref 31.4–37.4)
MCV RBC AUTO: 78 FL (ref 82–98)
MONOCYTES # BLD AUTO: 0.88 THOUSAND/ΜL (ref 0.17–1.22)
MONOCYTES NFR BLD AUTO: 9 % (ref 4–12)
NEUTROPHILS # BLD AUTO: 6.36 THOUSANDS/ΜL (ref 1.85–7.62)
NEUTS SEG NFR BLD AUTO: 67 % (ref 43–75)
NRBC BLD AUTO-RTO: 0 /100 WBCS
PLATELET # BLD AUTO: 286 THOUSANDS/UL (ref 149–390)
PMV BLD AUTO: 9.5 FL (ref 8.9–12.7)
RBC # BLD AUTO: 3.89 MILLION/UL (ref 3.81–5.12)
RPR SER QL: NORMAL
WBC # BLD AUTO: 9.62 THOUSAND/UL (ref 4.31–10.16)

## 2021-03-30 PROCEDURE — 99024 POSTOP FOLLOW-UP VISIT: CPT | Performed by: OBSTETRICS & GYNECOLOGY

## 2021-03-30 PROCEDURE — 85025 COMPLETE CBC W/AUTO DIFF WBC: CPT | Performed by: OBSTETRICS & GYNECOLOGY

## 2021-03-30 RX ADMIN — KETOROLAC TROMETHAMINE 30 MG: 30 INJECTION, SOLUTION INTRAMUSCULAR; INTRAVENOUS at 00:37

## 2021-03-30 RX ADMIN — ACETAMINOPHEN 650 MG: 325 TABLET, FILM COATED ORAL at 15:44

## 2021-03-30 RX ADMIN — OXYCODONE HYDROCHLORIDE 5 MG: 5 TABLET ORAL at 17:16

## 2021-03-30 RX ADMIN — DOCUSATE SODIUM 100 MG: 100 CAPSULE, LIQUID FILLED ORAL at 17:16

## 2021-03-30 RX ADMIN — ENOXAPARIN SODIUM 40 MG: 40 INJECTION SUBCUTANEOUS at 12:45

## 2021-03-30 RX ADMIN — ACETAMINOPHEN 650 MG: 325 TABLET, FILM COATED ORAL at 09:19

## 2021-03-30 RX ADMIN — ACETAMINOPHEN 650 MG: 325 TABLET, FILM COATED ORAL at 02:52

## 2021-03-30 RX ADMIN — KETOROLAC TROMETHAMINE 30 MG: 30 INJECTION, SOLUTION INTRAMUSCULAR; INTRAVENOUS at 06:05

## 2021-03-30 RX ADMIN — DOCUSATE SODIUM 100 MG: 100 CAPSULE, LIQUID FILLED ORAL at 09:19

## 2021-03-30 RX ADMIN — ENOXAPARIN SODIUM 40 MG: 40 INJECTION SUBCUTANEOUS at 22:40

## 2021-03-30 RX ADMIN — OXYCODONE HYDROCHLORIDE 10 MG: 10 TABLET ORAL at 22:40

## 2021-03-30 RX ADMIN — OXYCODONE HYDROCHLORIDE 5 MG: 5 TABLET ORAL at 12:44

## 2021-03-30 NOTE — PROGRESS NOTES
Progress Note - OB/GYN   Crismaylin Rhiannon 23 y o  female MRN: [id number]  Unit/Bed#: L&D 311-01 Encounter: [id number]    Assessment:  23 y o  [id number] s/p Repeat low transverse  section post-operative day 1  Pregnancy complicated by obesity, h/o  demise at 7 months d/t aspiration; her second child was adopted  Patient recovering well, Stable    Plan:  1  Postpartum  Continue routine post partum care  Pain management PRN  Encourage ambulation  Encourage breastfeeding    2    Hgb 11 3 --> 9 5  UOP 2 6 cc/kg/hr, simpson removed  VT    3  Obesity  BMI 42  Lovenox 40mg BID    4   Discharge  Anticipate d/c POD#3      Subjective/Objective   Chief Complaint:    Postpartum state    Subjective:   Pain: yes, cramping, improved with meds  Tolerating PO: yes  Voiding: yes  Flatus: yes  BM: no  Ambulating: yes  Breastfeeding:  yes  Chest pain: no  Shortness of breath: no  Leg pain: no  Lochia: minimal    Objective:     Vitals: Temp:  [97 °F (36 1 °C)-98 8 °F (37 1 °C)] 97 8 °F (36 6 °C)  HR:  [] 88  Resp:  [18-20] 18  BP: ()/(53-73) 104/66       Intake/Output Summary (Last 24 hours) at 3/30/2021 0615  Last data filed at 3/30/2021 0256  Gross per 24 hour   Intake 1200 ml   Output 1110 ml   Net 90 ml         Physical Exam:   General: NAD, alert, oriented  Cardio: Regular rate and rhythm, no murmur  Resp: nonlabored breathing, clear to auscultation bilaterally  Abdomen: Soft, no distension/rebound/guarding/tenderness   Fundus: Firm, non-tender, fundus:at umbilicus  Incision: C/D/I  G/U: Minimal lochia noted on pad  Lower Extremities: Non-tender, no palpable cords    Medications:  Current Facility-Administered Medications   Medication Dose Route Frequency    acetaminophen (TYLENOL) tablet 650 mg  650 mg Oral Q6H PRN    acetaminophen (TYLENOL) tablet 650 mg  650 mg Oral Q6H    benzocaine-menthol-lanolin-aloe (DERMOPLAST) 20-0 5 % topical spray 1 application  1 application Topical O5F PRN    calcium carbonate (TUMS) chewable tablet 1,000 mg  1,000 mg Oral Daily PRN    diphenhydrAMINE (BENADRYL) injection 25 mg  25 mg Intravenous Q6H PRN    diphenhydrAMINE (BENADRYL) injection 25 mg  25 mg Intravenous Q6H PRN    docusate sodium (COLACE) capsule 100 mg  100 mg Oral BID    enoxaparin (LOVENOX) subcutaneous injection 40 mg  40 mg Subcutaneous Q12H [facility]    hydrocortisone 1 % cream 1 application  1 application Topical Daily PRN    ibuprofen (MOTRIN) tablet 600 mg  600 mg Oral Q6H PRN    lactated ringers infusion  125 mL/hr Intravenous Continuous    naloxone (NARCAN) injection 0 1 mg  0 1 mg Intravenous Q3 min PRN    ondansetron (ZOFRAN) injection 4 mg  4 mg Intravenous Q8H PRN    ondansetron (ZOFRAN) injection 4 mg  4 mg Intravenous Q6H PRN    ondansetron (ZOFRAN) injection 4 mg  4 mg Intravenous Q4H PRN    oxyCODONE (ROXICODONE) immediate release tablet 10 mg  10 mg Oral Q4H PRN    oxyCODONE (ROXICODONE) IR tablet 10 mg  10 mg Oral Q6H PRN    oxyCODONE (ROXICODONE) IR tablet 5 mg  5 mg Oral Q4H PRN    oxyCODONE (ROXICODONE) IR tablet 5 mg  5 mg Oral Q4H PRN    simethicone (MYLICON) chewable tablet 80 mg  80 mg Oral 4x Daily PRN    witch hazel-glycerin (TUCKS) topical pad 1 pad  1 pad Topical Q4H PRN       Labs:   Recent Results (from the past 24 hour(s))   CBC    Collection Time: 03/29/21  9:06 AM   Result Value Ref Range    WBC 11 50 (H) 4 31 - 10 16 Thousand/uL    RBC 4 77 3 81 - 5 12 Million/uL    Hemoglobin 11 3 (L) 11 5 - 15 4 g/dL    Hematocrit 36 6 34 8 - 46 1 %    MCV 77 (L) 82 - 98 fL    MCH 23 7 (L) 26 8 - 34 3 pg    MCHC 30 9 (L) 31 4 - 37 4 g/dL    RDW 15 8 (H) 11 6 - 15 1 %    Platelets 506 423 - 981 Thousands/uL    MPV 9 0 8 9 - 12 7 fL   Type and screen and continue to monitor patient    Collection Time: 03/29/21  9:06 AM   Result Value Ref Range    ABO Grouping O     Rh Factor Positive     Antibody Screen Negative     Specimen Expiration Date [id number]    Blood gas, venous, cord    Collection Time: 03/29/21 11:26 AM   Result Value Ref Range    pH, Cord Eleazar 7 345 7 190 - 7 490    pCO2, Cord Eleazar 45 3 (H) 27 0 - 43 0 mm HG    pO2, Cord Eleazar 28 8 15 0 - 45 0 mm HG    HCO3, Cord Eleazar 24 2 12 2 - 28 6 mmol/L    Base Exc, Cord Eleazar -1 8 (L) 1 0 - 9 0 mmol/L    O2 Cont, Cord Eleazar 15 0 mL/dL    O2 HGB,VENOUS CORD 71 7 %   Blood gas, arterial, cord    Collection Time: 03/29/21 11:26 AM   Result Value Ref Range    pH, Cord Art 7 309 7 230 - 7 430    pCO2, Cord Art 53 2 30 0 - 60 0    pO2, Cord Art 17 8 5 0 - 25 0 mm HG    HCO3, Cord Art 26 1 17 3 - 27 3 mmol/L    Base Exc, Cord Art -1 0 (L) 3 0 - 11 0 mmol/L    O2 Content, Cord Art 8 8 ml/dl    O2 Hgb, Arterial Cord 42 1 %   CBC and differential    Collection Time: 03/30/21  5:26 AM   Result Value Ref Range    WBC 9 62 4 31 - 10 16 Thousand/uL    RBC 3 89 3 81 - 5 12 Million/uL    Hemoglobin 9 5 (L) 11 5 - 15 4 g/dL    Hematocrit 30 5 (L) 34 8 - 46 1 %    MCV 78 (L) 82 - 98 fL    MCH 24 4 (L) 26 8 - 34 3 pg    MCHC 31 1 (L) 31 4 - 37 4 g/dL    RDW 15 9 (H) 11 6 - 15 1 %    MPV 9 5 8 9 - 12 7 fL    Platelets 279 339 - 899 Thousands/uL    nRBC 0 /100 WBCs    Neutrophils Relative 67 43 - 75 %    Immat GRANS % 1 0 - 2 %    Lymphocytes Relative 22 14 - 44 %    Monocytes Relative 9 4 - 12 %    Eosinophils Relative 1 0 - 6 %    Basophils Relative 0 0 - 1 %    Neutrophils Absolute 6 36 1 85 - 7 62 Thousands/µL    Immature Grans Absolute 0 11 0 00 - 0 20 Thousand/uL    Lymphocytes Absolute 2 14 0 60 - 4 47 Thousands/µL    Monocytes Absolute 0 88 0 17 - 1 22 Thousand/µL    Eosinophils Absolute 0 11 0 00 - 0 61 Thousand/µL    Basophils Absolute 0 02 0 00 - 0 10 Thousands/µL         Campos Allen  Ob/Gyn PGY-1  3/30/2021  6:15 AM

## 2021-03-30 NOTE — PROGRESS NOTES
Offered to assist patient to sit/stand at bedside at 2100  Pt declined and requested tomorrow  Will attempt to ambulate later this evening

## 2021-03-30 NOTE — LACTATION NOTE
This note was copied from a baby's chart  Spoke with mom about need for supplementation due to low blood sugar  I discussed formula vs donor breast milk and alternate feeding methods  Mom initially chose to use formula, but then asked if she can try to pump to get her own breast milk to give to baby  Dr Khan made aware of mom's desire  He is ok with us trying to pump first  Mom also does not want baby to have a bottle, she wants to use SNS or finger feeding  I demo  use and cleaning of breast pump and helped her place it on her breasts  I will check back in 15 minutes, to see if she got any breast milk to give and then assist her with supplementation

## 2021-03-30 NOTE — LACTATION NOTE
This note was copied from a baby's chart  Assisted mom with feeding  Baby sleepy  Mom had pumped and did not get anything that we could collect this time  I assisted her with waking and baby latched fairly well but needed constant enc to stay awake  He fed for about 10 minutes and took 15 ml of neosure via SNS

## 2021-03-30 NOTE — PLAN OF CARE
Problem: POSTPARTUM  Goal: Experiences normal postpartum course  Description: INTERVENTIONS:  - Monitor maternal vital signs  - Assess uterine involution and lochia  Outcome: Progressing  Goal: Appropriate maternal -  bonding  Description: INTERVENTIONS:  - Identify family support  - Assess for appropriate maternal/infant bonding   -Encourage maternal/infant bonding opportunities  - Referral to  or  as needed  Outcome: Progressing  Goal: Establishment of infant feeding pattern  Description: INTERVENTIONS:  - Assess breast/bottle feeding  - Refer to lactation as needed  Outcome: Progressing  Goal: Incision(s), wounds(s) or drain site(s) healing without S/S of infection  Description: INTERVENTIONS  - Assess and document risk factors for skin impairment   - Assess and document dressing, incision, wound bed, drain sites and surrounding tissue  - Consider nutrition services referral as needed  - Oral mucous membranes remain intact  - Provide patient/ family education  Outcome: Progressing     Problem: PAIN - ADULT  Goal: Verbalizes/displays adequate comfort level or baseline comfort level  Description: Interventions:  - Encourage patient to monitor pain and request assistance  - Assess pain using appropriate pain scale  - Administer analgesics based on type and severity of pain and evaluate response  - Implement non-pharmacological measures as appropriate and evaluate response  - Consider cultural and social influences on pain and pain management  - Notify physician/advanced practitioner if interventions unsuccessful or patient reports new pain  Outcome: Progressing     Problem: INFECTION - ADULT  Goal: Absence or prevention of progression during hospitalization  Description: INTERVENTIONS:  - Assess and monitor for signs and symptoms of infection  - Monitor lab/diagnostic results  - Monitor all insertion sites, i e  indwelling lines, tubes, and drains  - Monitor endotracheal if appropriate and nasal secretions for changes in amount and color  - [place] appropriate cooling/warming therapies per order  - Administer medications as ordered  - Instruct and encourage patient and family to use good hand hygiene technique  - Identify and instruct in appropriate isolation precautions for identified infection/condition  Outcome: Progressing  Goal: Absence of fever/infection during neutropenic period  Description: INTERVENTIONS:  - Monitor WBC    Outcome: Progressing     Problem: SAFETY ADULT  Goal: Patient will remain free of falls  Description: INTERVENTIONS:  - Assess patient frequently for physical needs  -  Identify cognitive and physical deficits and behaviors that affect risk of falls    -  [place] fall precautions as indicated by assessment   - Educate patient/family on patient safety including physical limitations  - Instruct patient to call for assistance with activity based on assessment  - Modify environment to reduce risk of injury  - Consider OT/PT consult to assist with strengthening/mobility  Outcome: Progressing  Goal: Maintain or return to baseline ADL function  Description: INTERVENTIONS:  -  Assess patient's ability to carry out ADLs; assess patient's baseline for ADL function and identify physical deficits which impact ability to perform ADLs (bathing, care of mouth/teeth, toileting, grooming, dressing, etc )  - Assess/evaluate cause of self-care deficits   - Assess range of motion  - Assess patient's mobility; develop plan if impaired  - Assess patient's need for assistive devices and provide as appropriate  - Encourage maximum independence but intervene and supervise when necessary  - Involve family in performance of ADLs  - Assess for home care needs following discharge   - Consider OT consult to assist with ADL evaluation and planning for discharge  - Provide patient education as appropriate  Outcome: Progressing  Goal: Maintain or return mobility status to optimal level  Description: INTERVENTIONS:  - Assess patient's baseline mobility status (ambulation, transfers, stairs, etc )    - Identify cognitive and physical deficits and behaviors that affect mobility  - Identify mobility aids required to assist with transfers and/or ambulation (gait belt, sit-to-stand, lift, walker, cane, etc )  - [place] fall precautions as indicated by assessment  - Record patient progress and toleration of activity level on Mobility SBAR; progress patient to next Phase/Stage  - Instruct patient to call for assistance with activity based on assessment  - Consider rehabilitation consult to assist with strengthening/weightbearing, etc   Outcome: Progressing     Problem: Knowledge Deficit  Goal: Patient/family/caregiver demonstrates understanding of disease process, treatment plan, medications, and discharge instructions  Description: Complete learning assessment and assess knowledge base    Interventions:  - Provide teaching at level of understanding  - Provide teaching via preferred learning methods  Outcome: Progressing     Problem: DISCHARGE PLANNING  Goal: Discharge to home or other facility with appropriate resources  Description: INTERVENTIONS:  - Identify barriers to discharge w/patient and caregiver  - Arrange for needed discharge resources and transportation as appropriate  - Identify discharge learning needs (meds, wound care, etc )  - Arrange for interpretive services to assist at discharge as needed  - Refer to Case Management Department for coordinating discharge planning if the patient needs post-hospital services based on physician/advanced practitioner order or complex needs related to functional status, cognitive ability, or social support system  Outcome: Progressing

## 2021-03-30 NOTE — LACTATION NOTE
This note was copied from a baby's chart  Mom got 7ml of colostrum  I demo  to her how to give it to the baby at the breast, using a syringe and feeding tube  Baby latched and took it well  We then gave an additional 8 ml of neosure at the breast  I then informed mom that we will repeat baby's blood sugar in 1 hour, around 1245  I also showed her how to wash the pump and syring/feeding tube  I did all the teaching through a family member on the phone  Patient did not want to use the [facility]

## 2021-03-31 VITALS
TEMPERATURE: 98.4 F | SYSTOLIC BLOOD PRESSURE: 111 MMHG | DIASTOLIC BLOOD PRESSURE: 71 MMHG | WEIGHT: 253 LBS | HEIGHT: 65 IN | RESPIRATION RATE: 18 BRPM | BODY MASS INDEX: 42.15 KG/M2 | OXYGEN SATURATION: 98 % | HEART RATE: 95 BPM

## 2021-03-31 PROCEDURE — 99024 POSTOP FOLLOW-UP VISIT: CPT | Performed by: OBSTETRICS & GYNECOLOGY

## 2021-03-31 RX ORDER — OXYCODONE HYDROCHLORIDE 5 MG/1
5 TABLET ORAL EVERY 4 HOURS PRN
Qty: 6 TABLET | Refills: 0 | Status: SHIPPED | OUTPATIENT
Start: 2021-03-31 | End: 2021-04-10

## 2021-03-31 RX ORDER — ACETAMINOPHEN AND CODEINE PHOSPHATE 120; 12 MG/5ML; MG/5ML
1 SOLUTION ORAL DAILY
Qty: 28 TABLET | Refills: 3 | Status: SHIPPED | OUTPATIENT
Start: 2021-03-31 | End: 2021-06-25 | Stop reason: SDUPTHER

## 2021-03-31 RX ORDER — DOCUSATE SODIUM 100 MG/1
100 CAPSULE, LIQUID FILLED ORAL 2 TIMES DAILY
Qty: 10 CAPSULE | Refills: 0 | Status: SHIPPED | OUTPATIENT
Start: 2021-03-31

## 2021-03-31 RX ORDER — ACETAMINOPHEN 325 MG/1
650 TABLET ORAL EVERY 6 HOURS PRN
Refills: 0
Start: 2021-03-31

## 2021-03-31 RX ORDER — DIPHENHYDRAMINE HCL 25 MG
25 TABLET ORAL EVERY 6 HOURS PRN
Status: DISCONTINUED | OUTPATIENT
Start: 2021-03-31 | End: 2021-03-31 | Stop reason: HOSPADM

## 2021-03-31 RX ORDER — IBUPROFEN 600 MG/1
600 TABLET ORAL EVERY 6 HOURS PRN
Qty: 30 TABLET | Refills: 0 | Status: SHIPPED | OUTPATIENT
Start: 2021-03-31 | End: 2021-06-25 | Stop reason: SDUPTHER

## 2021-03-31 RX ADMIN — ACETAMINOPHEN 650 MG: 325 TABLET, FILM COATED ORAL at 12:16

## 2021-03-31 RX ADMIN — IBUPROFEN 600 MG: 600 TABLET, FILM COATED ORAL at 06:10

## 2021-03-31 RX ADMIN — DOCUSATE SODIUM 100 MG: 100 CAPSULE, LIQUID FILLED ORAL at 09:15

## 2021-03-31 RX ADMIN — ENOXAPARIN SODIUM 40 MG: 40 INJECTION SUBCUTANEOUS at 09:15

## 2021-03-31 NOTE — DISCHARGE INSTRUCTIONS
Noretindrona (Por la boca)   Norethindrone (nor-ETH-in-drone)  Robina un embarazo  También sirve para tratar problemas menstruales y endometriosis  Lorelei medicamento comúnmente se conoce romario naif píldora anticonceptiva  Brannon(s) : Aygestin, Carmela, Deblitane, Nanci, Opal, Adra Rung, Lyza, Noa-BE, Marcos, Norlyroc, Ortho Micronor, Lesdain, [place]   Existen muchas otras marcas de Dinesh  Lorelei medicamento no debe ser usado cuando:   Lorelei medicamento no es adecuado para todas las personas  No lo use si usted ha tenido naif reacción alérgica a un medicamento con progestina o si está Shirley Kandy  No lo use si usted tiene enfermedad del hígado, tumores en el hígado, o un historial de coágulos de Jaimee, derrame cerebral, ataque al corazón, o cáncer de seno  Forma de usar lorelei medicamento:   Hermelinda Pen  · Cummings médico le indicara cuanto medicamento necesita usar  No use más medicamento de lo indicado  Las [employer] de píldoras anticonceptivas tienen diferentes instrucciones de cuándo comenzar  Pregúntele a cummings médico o farmacéutico si no entiende que día va a empezar a rubina cummings brannon de píldoras  · Si el medicamento le produce [employer], usted puede tomarlo con comida o con Noble  · Mantenga las píldoras en el recipiente que le suministren en la farmacia  Rigby las pastillas en el orden en que aparecen en el empaque  · Luz Marina y siga las instrucciones para el paciente que vienen con el medicamento  Hable con cummings médico o farmacéutico si tiene alguna pregunta  · [employer] píldora a la misma hora todos los días, aún omid los días de cummigns periodo menstrual   · Rigby la dosis tan pronto romario lo recuerde  Si es radha la hora para cummings próxima dosis, espere hasta entonces para rubina cummings dosis regular  No tome medicamento adicional para reponer la dosis que omitió  Si usted mala Dickson Santiago de 3 horas de retraso, use otro método anticonceptivo omid las próximas 48 horas    · Metsa 49 píldoras en el envase original, alejado del calor, la humedad y la kyle directa  Medicamentos y [employer] que debe evitar:   Consulte con cummings médico o farmacéutico antes de usar cualquier medicamento, incluyendo los que compra sin receta médica, las vitaminas y los productos herbales  · Algunos alimentos y [employer] pueden afectar el funcionamiento de las píldoras anticonceptivas  Informe a cummings médico si usted también está tomando cualquiera de los siguientes:  ? Bromocriptina, rifampicina, Voldi 77, bosentan, griseofulvina  ? Antibiótico  ? Medicamentos para las convulsiones, incluyendo fenitoína, Cegdel, De queen, topiramato  ? Inhibidor de la proteasa que trata el Jessie Norris / Lemmie Duff  ? Barbitúricos (usado para el tratamiento de convulsiones, ansiedad o insomnio)  Precauciones omid el uso de iveth medicamento:   · Informe a cummings médico de inmediato si usted tong que Jerrilyn Angela  Si se retrasa cummings noe dos periodos seguidos, llame a cummings médico para que le realice naif prueba de embarazo antes de rubina más píldoras  · Informe a cummings médico si está dando de lactar o si tiene enfermedad del riñón, lupus, presión arterial nahum, colesterol alto, epilepsia, asma, migrañas, diabetes, o un historial de depresión  · Iveth medicamento puede causar los siguientes problemas:  ? Embarazo ectópico (en las trompas)  ? Quistes en los ovarios que pueden retorcerse o romperse  ? Posible riesgo de cáncer de seno  ? Tumor janie del hígado  ? Coágulos de [place], lo que puede provocar un derrame cerebral o ataque al corazón  · Es posible que usted presente manchas o sangrado irregular cuando empiece a usar iveth medicamento  Usted puede tener sangrado imprevisto si olvida rubina naif dosis o si mala naif dosis tarde  Llame a cummings médico si tong que hay un problema, por ejemplo, si tiene sangrado abundante    · Iveth medicamento no la protegerá contra el VIH / Lemmie Duff u otras enfermedades de transmisión sexual   · Use un shilpa método anticonceptivo omid los primeros 3 semanas para asegurarse de que está protegida contra el embarazo  · Fumar aumenta el riesgo de ataque al corazón, derrame cerebral o coágulo de carlos mientras [place] vieth medicamento  Hable con cummings doctor acerca de estos riesgos  · Dígale a todo médico o dentista encargado de atenderle que usted está usando [employer]  Puede que vieth medicamento afecte algunos resultados de [employer]  · Guarde todos los medicamentos fuera del alcance de los niños  Nunca comparta amanda medicamentos con [employer]  Efectos secundarios que pueden presentarse omid el uso de iveth medicamento:   Consulte inmediatamente con el médico si nota cualquiera de estos efectos secundarios:  · Reacción alérgica: Comezón o ronchas, hinchazón del modesto o las beth, hinchazón u hormigueo en la boca o garganta, opresión en el pecho, dificultad para respirar  · Dolor en el pecho, dificultad para respirar, o toser Jaimee  · Adormecimiento o debilidad en un lado del cuerpo, dolor de maría repentino o severo, problemas con el habla, la visión o para caminar  · Dolor en la parte baja del abdomen  · Dolor de maría intenso, sensibilidad a la kyle o el norman, náuseas o vómitos  · Dificultad para hillary, visión doble u otros problemas de los ojos  · Piel u ojos amarillos  Consulte con el médico si nota los siguientes efectos secundarios menos graves:   · Sensibilidad o inflamación del seno  · Dolor de maría  · Sangrado ligero o sangrado entre períodos  · [facility]  Consulte con el médico si nota otros efectos secundarios que tong son causados por iveth medicamento  Llame a cummings médico para consultarle [facility]  Usted puede notificar amanda efectos secundarios al FDA al [phone]  © [address] Information is for End User's use only and may not be sold, redistributed or otherwise used for commercial purposes  Esta información es sólo para uso en educación   Cummings intención no es darle un consejo médico sobre enfermedades o tratamientos  Colsulte con cummings Vijay Lesser farmacéutico antes de seguir cualquier régimen médico para saber si es seguro y efectivo para usted  Cesárea   LO QUE NECESITA SABER:   Naif cesárea o parto por cesárea es naif cirugía abdominal que se realiza para extraer a cummings bebé  INSTRUCCIONES SOBRE EL NATALIA HOSPITALARIA:   Llame al Derryl Mink de emergencias local (911 en los [place]) si:  · Usted se siente mareada, le hace falta el aire y tiene dolor de [place]  · Usted expectora carlos  Llame a cummings obstetra si:  · La carlos empapa el vendaje  · Se desprenden los puntos de sutura  · Cummings brazo o pierna se siente caliente, sensible y [place]  Se podría hillary inflamado y martin  · Usted presenta sangrado vaginal que empapa 1 toalla higiénica o más en 1 hora  · Tiene fiebre  · Cummings incisión está inflamada, enrojecida o drena pus  · Usted tiene preguntas o inquietudes acerca de usted o de cummings bebé  Medicamentos: Es posible que usted necesite alguno de los siguientes:  · Puede administrarse podrían administrarse  Pregunte al médico cómo debe rubina iveth medicamento de forma ahn  Algunos medicamentos recetados para el dolor contienen acetaminofén  No tome otros medicamentos que contengan acetaminofén sin consultarlo con cummings médico  Demasiado acetaminofeno puede causar daño al hígado  Los medicamentos recetados para el dolor podrían causar estreñimiento  Pregunte a cummings médico romario prevenir o tratar estreñimiento  · Acetaminofén dom el dolor y baja la fiebre  Está disponible sin receta médica  Pregunte la cantidad y la frecuencia con que debe tomarlos  [address]  [place] las etiquetas de todos los demás medicamentos que esté usando para saber si también contienen acetaminofén, o pregunte a cummings médico o farmacéutico  El acetaminofén puede causar daño en el hígado cuando no se mala de forma correcta   No use más de 4 gramos (4000 miligramos) en total de acetaminofeno en un día  · Los [place], romario el ibuprofeno, [facility] a disminuir la inflamación, el dolor y la [place]  Los AMBER pueden causar sangrado estomacal o problemas renales en ciertas personas  Si usted mala un medicamento anticoagulante, siempre pregúntele a cummings médico si los AMBER son seguros para usted  Siempre willie la etiqueta de iveth medicamento y [place] instrucciones  · [place] amanda medicamentos romario se le haya indicado  Consulte con cummings médico si usted tong que cummings medicamento no le está ayudando o si presenta efectos secundarios  Infórmele si es alérgico a cualquier medicamento  Mantenga naif lista actualizada de los [place], las vitaminas y los productos herbales que mala  Incluya los siguientes datos de los medicamentos: cantidad, frecuencia y motivo de administración  Traiga con usted la lista o los envases de las píldoras a amanda citas de seguimiento  Lleve la lista de los medicamentos con usted en brielle de naif emergencia  Cuidado de la herida: Lave cuidadosamente la herida de la incisión con agua y jabón cada día  Mantenga la herida limpia y seca  Use ropa suelta y cómoda que no le roce cummings herida  Pregunte acerca de bañarse y ducharse  Limite amanda actividades según le indicaron:  · Pregunte cuándo es seguro para que usted [facility], suba las escaleras, levante objetos pesados y tenga relaciones sexuales  · Pregunte cuándo podrá hacer ejercicio y qué tipos de ejercicio hacer  Comience lentamente y leticia más conforme esté más jackie  [place] líquidos según amanda indicaciones: Los líquidos la ayudan a mantenerse hidratada después de cummings procedimiento y disminuye cummings riesgo de un coágulo de carlos  Pregunte cuánto líquido debe rubina cada día y cuáles líquidos son los más adecuados para usted  Programe naif lily con cummings obstétrico romario se le indique: Es posible que usted necesite regresar para que le quiten los puntos de sutura o las grapas   Anote amanda preguntas para que se acuerde de hacerlas omid amanda visitas  © Copyright [address] Information is for End User's use only and may not be sold, redistributed or otherwise used for commercial purposes  All illustrations and images included in CareNotes® are the copyrighted property of A D A M , Inc  or 0 [facility] es sólo para uso en educación  Cummings intención no es darle un consejo médico sobre enfermedades o tratamientos  Colsulte con cummings [place] farmacéutico antes de seguir cualquier régimen médico para saber si es seguro y efectivo para usted  Complicaciones de naif infección   LO QUE NECESITA SABER:   ¿Qué son las complicaciones de [place] infección? Las complicaciones pueden ocurrir si no se diagnostica y no se le da tratamiento a naif infección a tiempo  Algunas infecciones podrían tener complicaciones aun cuando reciben tratamiento a tiempo  La infección puede propagarse de un lugar de cummings cuerpo al cuerpo entero a través de cummings carlos  El diagnóstico y tratamiento tempranos podrían evitar complicaciones romario bacteriemia, septicemia y shock séptico  Estas son condiciones serias que pueden ser naif amenaza para la mackenzie y que requieren tratamiento inmediato  ¿Qué tipos de complicaciones pueden ocurrir? · Bacteriemia es la presencia de bacterias en la carlos  La bacteriemia puede ocurrir cuando las infecciones de otras partes de cummings cuerpo, romraio de los pulmones, riñones o piel, pasan a la carlos  También puede suceder Michaelle Mon, romario los dispositivos de acceso venoso central, los cables de marcapasos o catéteres urinarios se infectan  Un dispositivo de acceso venoso central es naif vía intravenosa especial que se coloca en naif vena jerome y se ana laura allí omid un período prolongado de [place]  · Sepsis ocurre cuando naif infección se propaga y provoca que el cuerpo reaccione fuertemente a los gérmenes   Normalmente, el sistema de defensa del cuerpo Roselie Freed para combatir la infección en el área infectada  En naif septicemia, los químicos son liberados a través del cuerpo  Los químicos causan inflamación y pueden causar coagulación en los vasos sanguíneos pequeños, la cual es difícil de controlar  La inflamación y coagulación disminuyen el flujo sanguíneo y el oxígeno a los [place]  Es posible que esto cause que dejen de funcionar correctamente  La septicemia también se conoce romario síndrome de respuesta inflamatoria sistémica (SRIS) debido a la infección  · Un shock séptico es un tipo severo de septicemia que sucede conforme la septicemia empeora y provoca que múltiples órganos dejen de funcionar  La presión arterial baja demasiado y los órganos no obtienen suficiente Jaimee  [place] podría provocar daño permanente a los [place]  ¿Qué aumenta mi riesgo de tener complicaciones de infección? · Recibir tratamiento en un hospital por naif enfermedad grave o tener naif sonda permanente    · Ser muy joven o de edad muy avanzada    · Naif enfermedad crónica, romario EPOC, deficiencia cardíaca o diabetes    · Tener el sistema inmunitario debilitado debido a naif enfermedad o el uso prolongado de medicamentos    · Gerre Pae reciente o un procedimiento dental    · Lesiones severas romario quemaduras extensas    ¿Cuáles son los signos y síntomas de naif infección que hoffman empeorado? · Amalia Peppers o temperatura corporal baja con escalofríos y temblores violentos    · Inflamación de los tobillos o piernas    · Un cambio en el estado mental, romario confusión, pérdida del conocimiento o convulsiones    · Latido cardíaco acelerado o irregular    · Orinar poco o nada en lo absoluto    · Dificultad para respirar, mareos o debilidad    · Un salpullido en la piel o piel cálida y valentine    ¿Cómo se diagnostican las complicaciones de la infección? · Medición de amanda signos vitales podría mostrar naif temperatura, ritmo cardíaco o presión arterial anormales      · Exámenes de [place] y [facility] mostrarán si tiene infección, qué germen está provocando cummings enfermedad, la función de los órganos y proporcionará información acerca de cummings jatinder en general     · Los gases sanguíneos mostrarán cuánto oxígeno y dióxido de carbono hay en la carlos  Los Dickson Santiago informarán al médico la eficacia con la que amanda pulmones, corazón y riñones están funcionando  · Endy Caul, un ultrasonido, naif tomografía computarizada o naif imagen por resonancia magnética (IRM) podría mostrar el origen de la infección  Es posible que le administren líquido de contraste para que lo trague o a través de cummings vía intravenosa para que la infección se aprecie mejor en las imágenes  Informe al médico si usted ha tenido naif reacción alérgica a cualquier tipo de medio de [place]  No entre a la alanis donde se realiza la resonancia magnética con algo de metal  El metal puede causar lesiones serias  Dígale al médico si usted tiene algo de metal dentro de cummings cuerpo o por encima  · Un electrocardiograma es un examen que registra cummings ritmo cardíaco y la velocidad de los latidos de cummings corazón  Se [place] para revisar el ritmo cardíaco anormal y el daño al corazón a causa de la infección  · Un ecocardiograma es un tipo de [place]  Se usan ondas sonoras para mostrar la estructura y función de cummings corazón  El examen podría mostrar si la infección se ha propagado a las válvulas cardíacas  · Naif punción lumbar podría realizarse para examinar si el líquido cefalorraquídeo (LCR) tiene gérmenes o si hay signos de infección en el interior o alrededor de cummings cerebro  El LCR es el líquido que rodea cummings columna vertebral y cummings cerebro  ¿Cómo se tratan las complicaciones de la infección? Usted podría  necesitar alguno de los siguientes dependiendo de qué mcduffie severas karen las complicaciones:  · Podría ser necesario Cloria Grade o cambiar el catéter podría ser necesario para eliminar la infección      · Los medicamentos podrían ser administrados para aumentar la presión arterial y el flujo sanguíneo a amanda órganos  Es posible que se administren antibióticos para tratar naif infección Es posible que los medicamentos se administren también para disminuir la inflamación, controlar el azúcar en la carlos, prevenir úlceras estomacales y prevenir coágulos sanguíneos  · Tyron Leaver u otros procedimientos podrían ser necesarios para tratar problemas a causa de la septicemia o relacionados con las complicaciones de cummings infección  [place] podría incluir el drenar un absceso o extirpar el tejido infectado  ¿Qué puedo hacer para prevenir naif infección? Lo siguiente puede ayudar a prevenir naif infección, o evitar que naif infección empeore:      · Lávese las beth frecuentemente  Lávese las beth varias veces al día  Lávese después de usar el baño, después de cambiar pañales y antes de preparar la comida o comer  Use siempre agua y [place]  Frótese las beth enjabonadas, [facility] dedos  Lávese el frente y el dorso de [facility], y [place] dedos  Use los dedos de naif mano para restregar debajo de las uñas de la [place]  Lávese omid al menos 20 segundos  Enjuague con agua corriente caliente omid varios segundos  Luego séquese las beth con naif toalla limpia o naif toalla de papel  Puede usar un desinfectante para beth que contenga alcohol, si no hay agua y jabón disponibles  No se toque los ojos, la nariz o la boca sin antes [employer]  · Cúbrase al toser o estornudar  Use un pañuelo que cubra la boca y la [place]  Arroje el pañuelo a la basura de inmediato  Use el ángulo del brazo si no tiene un pañuelo disponible  Lávese las beth con agua y [place] o use un desinfectante de beth  · Limpie las superficies con frecuencia  Limpie las perillas de las lynsey, los muebles de la cocina, teléfonos celulares y otras superficies que la gente toca con frecuencia  Use naif togilmerita desinfectante, [place] de un solo uso o un paño que Eleonora Deny y saranzar   Use limpiadores desinfectantes si no tiene toallitas  Usted también puede elaborar un limpiador desinfectante mezclando 1 parte de blanqueador con 10 partes de agua  · Pregunte sobre las vacunas que pudiera necesitar  Las vacunas ayudan a prevenir la infección por algunos virus y bacterias  Vacúnese contra la influenza (gripe) tan pronto romario se recomiende cada año  La vacuna contra la gripe suele estar disponible a partir de septiembre u octubre  Los virus de la gripe [place], por lo que es importante vacunarse contra la gripe cada año  Vacúnese contra la neumonía si se recomienda  Esta vacuna generalmente se recomienda cada 5 años  Cummings médico le indicará cuándo recibir esta vacuna, de ser necesaria  Cummings médico puede indicarle si debe recibir [employer], y cuándo aplicárselas  Llame al número local de emergencias (911 en los [place]) o pídale a alguien que llame si:  · Tiene alguno de los siguientes signos de un ataque cardíaco:      ? Estrujamiento, presión o tensión en cummings pecho    ? Usted también podría presentar alguno de los siguientes:     § Malestar o dolor en cummings espalda, amy, mandíbula, abdomen, o brazo    § Falta de [employer] o vómitos    § Desvanecimiento o sudor frío repentino    · Usted tiene naif convulsión o pierde el conocimiento  · Usted tiene dificultad para respirar  · Amanda labios o uñas de las beth están [place]  · Usted se siente extremadamente débil y tiene dificultad para moverse  ¿Cuándo celestine buscar atención inmediata? · Amanda síntomas, romario la [place], [place], aún si usted está tomando un medicamento para el tratamiento de la infección  · Usted tiene más inflamación en amanda piernas, pies o abdomen  · Usted se siente mareado, débil o tiene sensación de [place]  · Usted ana laura de orinar u orina muy poco     ¿Cuándo celestine llamar a mi médico?  · Usted tiene preguntas o inquietudes acerca de cummings condición o cuidado        ACUERDOS SOBRE CUMMINGS CUIDADO:   Usted tiene el derecho de ayudar a planear cummings cuidado  Aprenda todo lo que pueda sobre cummings condición y romario darle tratamiento  Discuta amanda opciones de tratamiento con amanda médicos para decidir el cuidado que usted desea recibir  Usted siempre tiene el derecho de rechazar el tratamiento  Esta información es sólo para uso en educación  Cummings intención no es darle un consejo médico sobre enfermedades o tratamientos  Colsulte con cummings Skyler Patch farmacéutico antes de seguir cualquier régimen médico para saber si es seguro y efectivo para usted  © Copyright Bear Dominick Information is for End User's use only and may not be sold, redistributed or otherwise used for commercial purposes   All illustrations and images included in CareNotes® are the copyrighted property of A GEOVANNA A MEHRAN , Inc  or [address] [place] [address]

## 2021-03-31 NOTE — PROGRESS NOTES
Progress Note - OB/GYN   Crismaylin Rhiannon 23 y o  female MRN: [id number]  Unit/Bed#: L&D 311-01 Encounter: [id number]    Assessment:  23 y o  [id number] s/p Repeat low transverse  section post-operative day 2  Pregnancy complicated by obesity, h/o  demise at 7 months d/t aspiration; her second child was adopted  Pt requests micronor for contraception  Patient recovering well, Stable     Plan:  1  Postpartum  Continue routine post partum care  Pain management PRN  Encourage ambulation  Encourage breastfeeding     2    Hgb 11 3 --> 9 5  Passed voiding trial     3  Obesity  BMI 42  Lovenox 40mg BID     4   Discharge  Can consider d/c today, pending baby      Subjective/Objective   Chief Complaint:    Postpartum state    Subjective:   Pain: yes, cramping, improved with meds  Tolerating PO: yes  Voiding: yes  Flatus: yes  BM: no  Ambulating: yes  Breastfeeding:  yes  Chest pain: no  Shortness of breath: no  Leg pain: no  Lochia: minimal    Objective:     Vitals: Temp:  [98 °F (36 7 °C)-98 5 °F (36 9 °C)] 98 1 °F (36 7 °C)  HR:  [] 86  Resp:  [18] 18  BP: ()/(60-83) 101/67       Intake/Output Summary (Last 24 hours) at 3/31/2021 8342  Last data filed at 3/30/2021 1245  Gross per 24 hour   Intake --   Output 600 ml   Net -600 ml         Physical Exam:   General: NAD, alert, oriented  Cardio: Regular rate and rhythm, no murmur  Resp: nonlabored breathing, clear to auscultation bilaterally  Abdomen: Soft, no distension/rebound/guarding/tenderness   Fundus: Firm, non-tender, fundus: U-1  Incision: C/D/I, glue overlying  G/U: Minimal lochia noted on pad  Lower Extremities: Non-tender, no palpable cords    Medications:  Current Facility-Administered Medications   Medication Dose Route Frequency    acetaminophen (TYLENOL) tablet 650 mg  650 mg Oral Q6H PRN    benzocaine-menthol-lanolin-aloe (DERMOPLAST) 20-0 5 % topical spray 1 application  1 application Topical L8B PRN    calcium carbonate (TUMS) chewable tablet 1,000 mg  1,000 mg Oral Daily PRN    diphenhydrAMINE (BENADRYL) injection 25 mg  25 mg Intravenous Q6H PRN    docusate sodium (COLACE) capsule 100 mg  100 mg Oral BID    enoxaparin (LOVENOX) subcutaneous injection 40 mg  40 mg Subcutaneous Q12H [address]    hydrocortisone 1 % cream 1 application  1 application Topical Daily PRN    ibuprofen (MOTRIN) tablet 600 mg  600 mg Oral Q6H PRN    lactated ringers infusion  125 mL/hr Intravenous Continuous    ondansetron (ZOFRAN) injection 4 mg  4 mg Intravenous Q8H PRN    oxyCODONE (ROXICODONE) immediate release tablet 10 mg  10 mg Oral Q4H PRN    oxyCODONE (ROXICODONE) IR tablet 5 mg  5 mg Oral Q4H PRN    simethicone (MYLICON) chewable tablet 80 mg  80 mg Oral 4x Daily PRN    witch hazel-glycerin (TUCKS) topical pad 1 pad  1 pad Topical Q4H PRN       Labs:   No results found for this or any previous visit (from the past 24 hour(s))        Tiffani Acosta  Ob/Gyn PGY-1  3/31/2021  6:21 AM

## 2021-04-01 NOTE — UTILIZATION REVIEW
Notification of Maternity/Delivery & Moodus Birth Information for Admission   Notification of Maternity/Delivery for Admission to our facility [address]  Be advised that this patient was admitted to our facility under Inpatient Status  Contact Ngoc Peter at [phone] for additional admission information  [facility] DEPT  DEDICATED [phone]  Mother & Moodus Information   Patient Name: Gris Barth YOB: 2000   Delivering clinician: [facility]   OB History        3    Para   3    Term   2       1    AB        Living   2       SAB        TAB        Ectopic        Multiple   0    Live Births   3                Name & MRN:   Information for the patient's :  [place]) [[id number]]      Delivery Information:  Sex: male  Delivered 3/29/2021 11:26 AM by , Low Transverse; Gestational Age: 38w3d     Measurements:  Weight: 8 lb 15 6 oz (4070 g); Height: 20 5"    APGAR 1 minute 5 minutes 10 minutes   Totals: 8 9       Birth Information: 23 y o  female MRN: [id number] Unit/Bed#: L&D 311-01 Estimated Date of Delivery: 21  Birthweight: No birth weight on file   Gestational Age: <None> Delivery Type: , Low Transverse          APGARS  One minute Five minutes Ten minutes   Totals:                 [address]   [address]:   Tylor Bonilla  Tax ID: [id number]  NPI: [id number] Attending Provider/NPI:  Phone:  Address: Adin Rogers Gloria [[id number]]  [phone]  Same as Facility   Place of Service Code: 24 Place of Service Name: [address] [place] [address]   Start Date: 3/29/21 2889   Discharge Date & Time: 3/31/2021  2:29 PM    Type of Admission: Inpatient Status Discharge Disposition (if discharged): Home/Self Care   Patient Diagnoses:   Encounter for  delivery without indication [O82]  The primary encounter diagnosis was Status post repeat low transverse  section  Diagnoses of 38 weeks gestation of pregnancy, Lactating mother, and Oral contraception initiation were also pertinent to this visit  1  Status post repeat low transverse  section    2  38 weeks gestation of pregnancy    3  Lactating mother    4  Oral contraception initiation       Orders: Admission Orders (From admission, onward)     Ordered        21 0904  Inpatient Admission  Once                    Assigned Utilization Review Contact: Eva Darnell Utilization Review Department  Phone: [phone]; Fax [phone]  Email: [email or web address]

## 2021-04-06 LAB — PLACENTA IN STORAGE: NORMAL

## 2021-04-07 ENCOUNTER — POSTPARTUM VISIT (OUTPATIENT)
Dept: OBGYN CLINIC | Facility: CLINIC | Age: 21
End: 2021-04-07

## 2021-04-07 VITALS
BODY MASS INDEX: 40.4 KG/M2 | SYSTOLIC BLOOD PRESSURE: 105 MMHG | DIASTOLIC BLOOD PRESSURE: 68 MMHG | HEART RATE: 87 BPM | WEIGHT: 242.8 LBS

## 2021-04-07 DIAGNOSIS — Z98.891 STATUS POST REPEAT LOW TRANSVERSE CESAREAN SECTION: Primary | ICD-10-CM

## 2021-04-07 PROBLEM — O99.213 OBESITY AFFECTING PREGNANCY IN THIRD TRIMESTER: Status: RESOLVED | Noted: 2020-11-11 | Resolved: 2021-04-07

## 2021-04-07 PROBLEM — O09.893 H/O PRETERM DELIVERY, CURRENTLY PREGNANT, THIRD TRIMESTER: Status: RESOLVED | Noted: 2020-11-11 | Resolved: 2021-04-07

## 2021-04-07 PROBLEM — Z3A.39 39 WEEKS GESTATION OF PREGNANCY: Status: RESOLVED | Noted: 2021-02-23 | Resolved: 2021-04-07

## 2021-04-07 PROBLEM — Z34.93 PRENATAL CARE IN THIRD TRIMESTER: Status: RESOLVED | Noted: 2020-10-14 | Resolved: 2021-04-07

## 2021-04-07 PROCEDURE — 99214 OFFICE O/P EST MOD 30 MIN: CPT | Performed by: NURSE PRACTITIONER

## 2021-04-07 NOTE — PATIENT INSTRUCTIONS
Call with needs or concerns  Return in 2 weeks for postpartum visit    COVID-19 Instructions    If you are having any of the following:  Cough   Shortness of breath   Fever  If traveled within past 2 weeks internationally or to high risk [place] states  Or been in contact with someone that has     Please call either:   Your PCP office  [phone], option [phone]    They will screen you over the phone and direct you to the nearest appropriate testing location    DO NOT go to your PCP or OB office without calling first

## 2021-04-07 NOTE — PROGRESS NOTES
Assessment/Plan:         Diagnoses and all orders for this visit:    Status post repeat low transverse  section    Postpartum follow-up      Plan  Call with needs or concerns  Return in 2 weeks for postpartum visit  Pt verbalized understanding of all discussed '    Subjective:      Patient ID: Arabella Hussein is a 23 y o  female  HPI  Pt presents 1 week post repeat LTCS  Pt denies depression, is assisted by the FOB and her mother  Pt is breastfeeding, denies pain, redness or breast lumps  Pt denies problems with urinating or BM's  Denies excessive VB  Denies pain, redness or swelling in her legs  Pt states she is aware exercise and intercourse should not resume until 6 weeks postpartum  Pt states she has started Micronor for birth control as directed in the hospital  The following portions of the patient's history were reviewed and updated as appropriate: allergies, current medications, past family history, past medical history, past social history, past surgical history and problem list     Review of Systems      Pertinent items are note in the HPI    Objective:      /68   Pulse 87   Wt 110 kg (242 lb 12 8 oz)   LMP 2020   BMI 40 40 kg/m²          Physical Exam  Vitals signs reviewed  Constitutional:       Appearance: Normal appearance  Eyes:      General:         Right eye: No discharge  Left eye: No discharge  Neck:      Musculoskeletal: Normal range of motion  Pulmonary:      Effort: Pulmonary effort is normal  No respiratory distress  Abdominal:      Palpations: Abdomen is soft  Musculoskeletal: Normal range of motion  Skin:     General: Skin is warm and dry  Neurological:      Mental Status: She is alert and oriented to person, place, and time  Psychiatric:         Mood and Affect: Mood normal          Behavior: Behavior normal          Thought Content:  Thought content normal        Negative cough or SOB  Abdomin negative LTCS incision healed negative erythema or drainage, soft, NT

## 2021-04-21 ENCOUNTER — POSTPARTUM VISIT (OUTPATIENT)
Dept: OBGYN CLINIC | Facility: CLINIC | Age: 21
End: 2021-04-21

## 2021-04-21 VITALS
HEART RATE: 75 BPM | WEIGHT: 238 LBS | DIASTOLIC BLOOD PRESSURE: 67 MMHG | HEIGHT: 65 IN | SYSTOLIC BLOOD PRESSURE: 102 MMHG | BODY MASS INDEX: 39.65 KG/M2

## 2021-04-21 DIAGNOSIS — Z98.891 STATUS POST REPEAT LOW TRANSVERSE CESAREAN SECTION: ICD-10-CM

## 2021-04-21 PROCEDURE — 3008F BODY MASS INDEX DOCD: CPT | Performed by: NURSE PRACTITIONER

## 2021-04-21 PROCEDURE — 3008F BODY MASS INDEX DOCD: CPT | Performed by: OBSTETRICS & GYNECOLOGY

## 2021-04-21 PROCEDURE — 3725F SCREEN DEPRESSION PERFORMED: CPT | Performed by: NURSE PRACTITIONER

## 2021-04-21 PROCEDURE — 1036F TOBACCO NON-USER: CPT | Performed by: NURSE PRACTITIONER

## 2021-04-21 PROCEDURE — 99214 OFFICE O/P EST MOD 30 MIN: CPT | Performed by: NURSE PRACTITIONER

## 2021-04-21 NOTE — PATIENT INSTRUCTIONS
Return for 3 months nirth control pill check  Annual GYN exam is due after 9/16/2021   Call with needs or concerns    COVID-19 Instructions    If you are having any of the following:  Cough   Shortness of breath   Fever  If traveled within past 2 weeks internationally or to high risk [place] states  Or been in contact with someone that has     Please call either:   Your PCP office  [phone], option [phone]    They will screen you over the phone and direct you to the nearest appropriate testing location    DO NOT go to your PCP or OB office without calling first

## 2021-04-21 NOTE — PROGRESS NOTES
OB POSTPARTUM VISIT PROGRESS NOTE  Date of Encounter: 2021    Maryjoylin Rhiannon    : 2000  (23 y o )  MR: [id number]    Subjective   Maryjoylin is in for her postpartum visit  She is now [id number]  She delivered by repeat  section  She's generally doing well, denies current pain or bleeding issues, and has no significant depression issues  Pt states she is assisted by FOB and her mother She is breast feeding exclusively  We discussed all appropriate contraceptive options and she chooses Progesterone OCP, pt started OCP's 1 week ago  Aware exercise and intercourse should not resume until 6 weeks postpartum  Pt denies problems with urinating or BM's    LABS:  Last WNL PAP was 2020    Objective   EXAM:  GENERAL: alert, well appearing, and in no distress  VITALS: Blood pressure 102/67, pulse 75, height 5' 5" (1 651 m), weight 108 kg (238 lb), currently breastfeeding  BMI: Body mass index is 39 61 kg/m²  BREASTS: Denies pain, redness or lumps, breast feeding  ABDOMEN: LTCS incision was healed, NT, negative drainage or erythema  EXTREMITIES: Denies pain, redness or edema    Assessment/Plan   Diagnoses and all orders for this visit:    Postpartum follow-up    Status post repeat low transverse  section    Other orders  -     D-Vi-Sol 10 MCG/ML LIQD        Plan  Patient Instructions   Return for 3 months nirth control pill check  Annual GYN exam is due after 2021   Call with needs or concerns      Return in about 3 months (around 2021) for follow up visit, 3 months OCP check  Pt verbalized understanding of all discussed      SILVIA Webster

## 2021-04-26 ENCOUNTER — TELEPHONE (OUTPATIENT)
Dept: OBGYN CLINIC | Facility: CLINIC | Age: 21
End: 2021-04-26

## 2021-04-26 NOTE — TELEPHONE ENCOUNTER
Patient called and reports that she wants to try BCP  For three months and talk about IUD at her follow up appointment

## 2021-06-23 ENCOUNTER — TELEPHONE (OUTPATIENT)
Dept: OBGYN CLINIC | Facility: CLINIC | Age: 21
End: 2021-06-23

## 2021-06-25 ENCOUNTER — OFFICE VISIT (OUTPATIENT)
Dept: OBGYN CLINIC | Facility: CLINIC | Age: 21
End: 2021-06-25

## 2021-06-25 VITALS
DIASTOLIC BLOOD PRESSURE: 64 MMHG | SYSTOLIC BLOOD PRESSURE: 99 MMHG | BODY MASS INDEX: 40.48 KG/M2 | HEART RATE: 72 BPM | WEIGHT: 243 LBS | HEIGHT: 65 IN

## 2021-06-25 DIAGNOSIS — Z30.011 ORAL CONTRACEPTION INITIATION: ICD-10-CM

## 2021-06-25 DIAGNOSIS — Z30.41 ENCOUNTER FOR SURVEILLANCE OF CONTRACEPTIVE PILLS: Primary | ICD-10-CM

## 2021-06-25 DIAGNOSIS — Z98.891 STATUS POST REPEAT LOW TRANSVERSE CESAREAN SECTION: ICD-10-CM

## 2021-06-25 DIAGNOSIS — Z78.9 BREASTFEEDING (INFANT): ICD-10-CM

## 2021-06-25 DIAGNOSIS — M54.9 BACK PAIN, UNSPECIFIED BACK LOCATION, UNSPECIFIED BACK PAIN LATERALITY, UNSPECIFIED CHRONICITY: ICD-10-CM

## 2021-06-25 DIAGNOSIS — Z30.09 GENERAL COUNSELING AND ADVICE ON FEMALE CONTRACEPTION: ICD-10-CM

## 2021-06-25 PROCEDURE — 99213 OFFICE O/P EST LOW 20 MIN: CPT | Performed by: NURSE PRACTITIONER

## 2021-06-25 RX ORDER — IBUPROFEN 600 MG/1
600 TABLET ORAL EVERY 6 HOURS PRN
Qty: 30 TABLET | Refills: 1 | Status: SHIPPED | OUTPATIENT
Start: 2021-06-25

## 2021-06-25 RX ORDER — ACETAMINOPHEN AND CODEINE PHOSPHATE 120; 12 MG/5ML; MG/5ML
1 SOLUTION ORAL DAILY
Qty: 28 TABLET | Refills: 11 | Status: SHIPPED | OUTPATIENT
Start: 2021-06-25 | End: 2021-09-21 | Stop reason: SDUPTHER

## 2021-06-25 NOTE — PROGRESS NOTES
Assessment/Plan:         Diagnoses and all orders for this visit:    Encounter for surveillance of contraceptive pills    General counseling and advice on female contraception    Breastfeeding (infant)    Back pain, unspecified back location, unspecified back pain laterality, unspecified chronicity    Oral contraception initiation  -     norethindrone (MICRONOR) 0 35 MG tablet; Take 1 tablet (0 35 mg total) by mouth daily    Status post repeat low transverse  section  -     ibuprofen (MOTRIN) 600 mg tablet; Take 1 tablet (600 mg total) by mouth every 6 (six) hours as needed for moderate pain      Plan  Continue progesterone only pills as previously directed  Call if you stop breastfeeding and would like to switch to a combination birth control pill  Annual GYN exam is due after 2021  Call with needs or concerns  Pt verbalized understanding of all discussed  Subjective:      Patient ID: Arlin Ohara is a 23 y o  female  HPI  Pt present to continue birth control pills  Pt states she is still exclusively breast feeding, taking birth control pills as directed but is not having period  Pt states she is not missing pills and would like to continue you the method of birth control  Last WNL PAP was 2020  Pt C/O pain in the area of her back where she had her Epidural 2 months ago, pt is requesting a refill of Motrin    Explained that not having period while exclusively breastfeeding is WNL     The following portions of the patient's history were reviewed and updated as appropriate: allergies, current medications, past family history, past medical history, past social history, past surgical history and problem list     Review of Systems      Pertinent items are note in the HPI    Objective:      BP 99/64   Pulse 72   Ht 5' 5" (1 651 m)   Wt 110 kg (243 lb)   BMI 40 44 kg/m²          Physical Exam  Vitals reviewed  Constitutional:       Appearance: Normal appearance     Eyes: General:         Right eye: No discharge  Pulmonary:      Effort: Pulmonary effort is normal  No respiratory distress  Musculoskeletal:         General: Normal range of motion  Cervical back: Normal range of motion  Neurological:      Mental Status: She is alert and oriented to person, place, and time  Psychiatric:         Mood and Affect: Mood normal          Behavior: Behavior normal          Thought Content:  Thought content normal        Negtive cough or SOB

## 2021-06-25 NOTE — PATIENT INSTRUCTIONS
Continue progesterone only pills as previously directed  Call if you stop breastfeeding and would like to switch to a combination birth control pill  Annual GYN exam is due after 9/16/2021  Call with needs or concerns    COVID-19 Instructions    If you are having any of the following:  Cough   Shortness of breath   Fever  If traveled within past 2 weeks internationally or to high risk [place] states  Or been in contact with someone that has     Please call either:   Your PCP office  [phone], option [phone]    They will screen you over the phone and direct you to the nearest appropriate testing location    DO NOT go to your PCP or OB office without calling first

## 2021-07-07 ENCOUNTER — TELEPHONE (OUTPATIENT)
Dept: OBGYN CLINIC | Facility: CLINIC | Age: 21
End: 2021-07-07

## 2021-07-08 NOTE — TELEPHONE ENCOUNTER
Patient reports that she is still exclusively breast feeding and is asking if she soul continue taking BCP micronor  Explained to patient to continue taking as prescribe while breastfeeding  Patient verbalized understanding

## 2021-09-21 ENCOUNTER — ANNUAL EXAM (OUTPATIENT)
Dept: OBGYN CLINIC | Facility: CLINIC | Age: 21
End: 2021-09-21

## 2021-09-21 VITALS
WEIGHT: 251 LBS | SYSTOLIC BLOOD PRESSURE: 102 MMHG | BODY MASS INDEX: 41.82 KG/M2 | HEIGHT: 65 IN | DIASTOLIC BLOOD PRESSURE: 68 MMHG | HEART RATE: 80 BPM

## 2021-09-21 DIAGNOSIS — Z01.419 ENCOUNTER FOR ANNUAL ROUTINE GYNECOLOGICAL EXAMINATION: Primary | ICD-10-CM

## 2021-09-21 DIAGNOSIS — Z30.41 ENCOUNTER FOR SURVEILLANCE OF CONTRACEPTIVE PILLS: ICD-10-CM

## 2021-09-21 DIAGNOSIS — Z30.09 GENERAL COUNSELING AND ADVICE ON FEMALE CONTRACEPTION: ICD-10-CM

## 2021-09-21 DIAGNOSIS — Z30.011 ORAL CONTRACEPTION INITIATION: ICD-10-CM

## 2021-09-21 PROCEDURE — 99395 PREV VISIT EST AGE 18-39: CPT | Performed by: NURSE PRACTITIONER

## 2021-09-21 RX ORDER — ACETAMINOPHEN AND CODEINE PHOSPHATE 120; 12 MG/5ML; MG/5ML
1 SOLUTION ORAL DAILY
Qty: 28 TABLET | Refills: 11 | Status: SHIPPED | OUTPATIENT
Start: 2021-09-21 | End: 2022-07-15

## 2021-09-21 NOTE — PROGRESS NOTES
Annual Exam    Assessment   1  Encounter for annual routine gynecological examination     2  Encounter for surveillance of contraceptive pills  norethindrone (MICRONOR) 0 35 MG tablet   3  General counseling and advice on female contraception     4  Oral contraception initiation  norethindrone (MICRONOR) 0 35 MG tablet     well woman       Plan       All questions answered  Breast self exam technique reviewed and patient encouraged to perform self-exam monthly  Contraception: oral progesterone-only contraceptive  Discussed healthy lifestyle modifications  Follow up in 1 year  Patient Instructions   Continue Micronor as previously directed  Call if you stop or decrease breastfeeding and would like to switch to a combination birth control pill  Call with needs or concerns  Return in 1 year   Pt verbalized understanding of all discussed  Subjective      Gus Jurado is a 23 y o   female who presents for annual well woman exam  Periods are regular every 28-30 days, lasting 3 days  No intermenstrual bleeding, spotting, or discharge  Last WNL PAP was 2020  1 partner 2 years, denies domestic violence  Pt is still exclusively breastfeeding, using Micronor for birth control    Discussed possibility of switching to a combination OCP if she is not remembering POP, pt states she remembers daily OCP and would like to continue Mictronor    Depression Screening Follow-up Plan: Patient's depression screening was negative with a PHQ-2 score of 1  Their PHQ-9 score was 2  Clinically patient does not have depression  No treatment is required  BMI Counseling: Body mass index is 41 77 kg/m²  The BMI is above normal  Nutrition recommendations include reducing portion sizes, reducing fast food intake, consuming healthier snacks, moderation in carbohydrate intake and increasing intake of lean protein   Exercise recommendations include moderate aerobic physical activity for 150 minutes/week  Current contraception: oral progesterone-only contraceptive  History of abnormal Pap smear: no  Family history of uterine or ovarian cancer: no  Regular self breast exam: yes  History of abnormal mammogram: N/A  Family history of breast cancer: no  History of abnormal lipids: unknown  Menstrual History:  OB History        3    Para   3    Term   2       1    AB        Living   2       SAB        TAB        Ectopic        Multiple   0    Live Births   3                Menarche age: 13  No LMP recorded  (Menstrual status: Birth Control)  LMP irregular VB due to breastfeeding       The following portions of the patient's history were reviewed and updated as appropriate: allergies, current medications, past family history, past medical history, past social history, past surgical history and problem list     Review of Systems  Pertinent items are noted in HPI        Objective      /68   Pulse 80   Ht 5' 5" (1 651 m)   Wt 114 kg (251 lb)   BMI 41 77 kg/m²     General: alert and oriented, in no acute distress, alert, appears stated age and cooperative   Heart: regular rate and rhythm, S1, S2 normal, no murmur, click, rub or gallop   Lungs: clear to auscultation bilaterally, WNL respiratory effort, negative cough or SOB   Thyroid: Negative masses   Abdomen: soft, non-tender, without masses or organomegaly   Vulva: normal   Vagina: normal mucosa   Cervix: no cervical motion tenderness and no lesions   Uterus: normal size, non-tender, normal shape and consistency   Adnexa: normal adnexa   Urethra: normal   Breasts: NT,negative masses, abnormal discharge, breastfeeding or dimpling

## 2021-09-21 NOTE — PATIENT INSTRUCTIONS
Continue Micronor as previously directed  Call if you stop or decrease breastfeeding and would like to switch to a combination birth control pill  Call with needs or concerns  Return in 1 year     COVID-19 Instructions    If you are having any of the following:  Cough   Shortness of breath   Fever  If traveled within past 2 weeks internationally or to high risk [place] states  Or been in contact with someone that has     Please call either:   Your PCP office  [phone], option [phone]    They will screen you over the phone and direct you to the nearest appropriate testing location    DO NOT go to your PCP or OB office without calling first

## 2021-12-13 ENCOUNTER — APPOINTMENT (OUTPATIENT)
Dept: LAB | Facility: HOSPITAL | Age: 21
End: 2021-12-13
Payer: COMMERCIAL

## 2021-12-13 DIAGNOSIS — D50.0 IRON DEFICIENCY ANEMIA DUE TO CHRONIC BLOOD LOSS: ICD-10-CM

## 2021-12-13 LAB
25(OH)D3 SERPL-MCNC: 23.5 NG/ML (ref 30–100)
ERYTHROCYTE [DISTWIDTH] IN BLOOD BY AUTOMATED COUNT: 16 %
FOLATE SERPL-MCNC: 12.3 NG/ML (ref 3.1–17.5)
HCT VFR BLD AUTO: 37.5 % (ref 36–46)
HGB BLD-MCNC: 11.9 G/DL (ref 12–16)
MCH RBC QN AUTO: 24.1 PG (ref 26–34)
MCHC RBC AUTO-ENTMCNC: 31.7 G/DL (ref 31–36)
MCV RBC AUTO: 76 FL (ref 80–100)
PLATELET # BLD AUTO: 360 THOUSANDS/UL (ref 150–450)
PMV BLD AUTO: 8.1 FL (ref 8.9–12.7)
RBC # BLD AUTO: 4.93 MILLION/UL (ref 4–5.2)
VIT B12 SERPL-MCNC: 541 PG/ML (ref 100–900)
WBC # BLD AUTO: 8.3 THOUSAND/UL (ref 4.5–11)

## 2021-12-13 PROCEDURE — 82746 ASSAY OF FOLIC ACID SERUM: CPT

## 2021-12-13 PROCEDURE — 85027 COMPLETE CBC AUTOMATED: CPT

## 2021-12-13 PROCEDURE — 82306 VITAMIN D 25 HYDROXY: CPT

## 2021-12-13 PROCEDURE — 36415 COLL VENOUS BLD VENIPUNCTURE: CPT

## 2021-12-13 PROCEDURE — 82607 VITAMIN B-12: CPT

## 2021-12-27 ENCOUNTER — APPOINTMENT (OUTPATIENT)
Dept: LAB | Facility: HOSPITAL | Age: 21
End: 2021-12-27
Payer: COMMERCIAL

## 2021-12-27 DIAGNOSIS — D64.9 ANEMIA, UNSPECIFIED TYPE: ICD-10-CM

## 2021-12-27 LAB
FERRITIN SERPL-MCNC: 38 NG/ML (ref 8–388)
IRON SATN MFR SERPL: 11 % (ref 15–50)
IRON SERPL-MCNC: 37 UG/DL (ref 50–170)
TIBC SERPL-MCNC: 347 UG/DL (ref 250–450)

## 2021-12-27 PROCEDURE — 82728 ASSAY OF FERRITIN: CPT

## 2021-12-27 PROCEDURE — 83550 IRON BINDING TEST: CPT

## 2021-12-27 PROCEDURE — 36415 COLL VENOUS BLD VENIPUNCTURE: CPT

## 2021-12-27 PROCEDURE — 83540 ASSAY OF IRON: CPT

## 2022-04-22 ENCOUNTER — OFFICE VISIT (OUTPATIENT)
Dept: OBGYN CLINIC | Facility: CLINIC | Age: 22
End: 2022-04-22

## 2022-04-22 ENCOUNTER — PATIENT OUTREACH (OUTPATIENT)
Dept: OBGYN CLINIC | Facility: CLINIC | Age: 22
End: 2022-04-22

## 2022-04-22 VITALS
HEART RATE: 82 BPM | BODY MASS INDEX: 40.27 KG/M2 | DIASTOLIC BLOOD PRESSURE: 66 MMHG | SYSTOLIC BLOOD PRESSURE: 100 MMHG | WEIGHT: 242 LBS

## 2022-04-22 DIAGNOSIS — Z30.09 GENERAL COUNSELING AND ADVICE ON FEMALE CONTRACEPTION: Primary | ICD-10-CM

## 2022-04-22 PROCEDURE — 99213 OFFICE O/P EST LOW 20 MIN: CPT | Performed by: NURSE PRACTITIONER

## 2022-04-22 NOTE — PATIENT INSTRUCTIONS
Continue birth control pills as previously directed  You will be called when Mirena IUD is avalable for insertion  Call with needs or concerns  Schedule annual GYN exam    COVID-19 Instructions    If you are having any of the following:  Cough   Shortness of breath   Fever  If traveled within past 2 weeks internationally or to high risk [place] states  Or been in contact with someone that has     Please call either:   Your PCP office  [phone], option [phone]    They will screen you over the phone and direct you to the nearest appropriate testing location  DO NOT go to your PCP or OB office without calling first     Thank you for your confidence in our team    We appreciate you and welcome your feedback  If you receive a survey from us, please take a few moments to let us know how we are doing     Sincerely,  SILVIA Valderrama

## 2022-04-22 NOTE — PROGRESS NOTES
Assessment/Plan:         Diagnoses and all orders for this visit:    General counseling and advice on female contraception      Plans  Continue birth control pills as previously directed  You will be called when Mirena IUD is avalable for insertion  Call with needs or concerns  Schedule annual GYN exam  Pt verbalized understanding of all discussed  Subjective:      Patient ID: Tomas Watts is a 19 y o  female  HPI   Pt presents to switch for progesterone only OCP's to an IUD   Pt statees she had an IUD in the past in [facility] and likes the method  Pt is still breastfeeding her 3year old child  Pt would like a Mirena    Safe and effective use of Mirena IUD was provided, pt signed paper work today    The following portions of the patient's history were reviewed and updated as appropriate: allergies, current medications, past family history, past medical history, past social history, past surgical history and problem list     Review of Systems      Pertinent items are note in the HPI    Objective:      /66   Pulse 82   Wt 110 kg (242 lb)   BMI 40 27 kg/m²          Physical Exam  Vitals reviewed  Constitutional:       Appearance: Normal appearance  Eyes:      General:         Right eye: No discharge  Left eye: No discharge  Pulmonary:      Effort: Pulmonary effort is normal  No respiratory distress  Musculoskeletal:         General: Normal range of motion  Cervical back: Normal range of motion  Neurological:      Mental Status: She is alert and oriented to person, place, and time  Psychiatric:         Mood and Affect: Mood normal          Behavior: Behavior normal          Thought Content:  Thought content normal        Negative cough or SOB

## 2022-06-17 ENCOUNTER — PROCEDURE VISIT (OUTPATIENT)
Dept: OBGYN CLINIC | Facility: CLINIC | Age: 22
End: 2022-06-17

## 2022-06-17 VITALS
WEIGHT: 237.8 LBS | HEART RATE: 91 BPM | BODY MASS INDEX: 39.57 KG/M2 | DIASTOLIC BLOOD PRESSURE: 74 MMHG | SYSTOLIC BLOOD PRESSURE: 113 MMHG

## 2022-06-17 DIAGNOSIS — Z30.430 ENCOUNTER FOR IUD INSERTION: ICD-10-CM

## 2022-06-17 DIAGNOSIS — Z30.09 UNWANTED FERTILITY: Primary | ICD-10-CM

## 2022-06-17 LAB — SL AMB POCT URINE HCG: NEGATIVE

## 2022-06-17 PROCEDURE — 81025 URINE PREGNANCY TEST: CPT | Performed by: NURSE PRACTITIONER

## 2022-06-17 PROCEDURE — 58300 INSERT INTRAUTERINE DEVICE: CPT | Performed by: NURSE PRACTITIONER

## 2022-06-17 NOTE — PATIENT INSTRUCTIONS
Bhanu por cummings confianza en nuestro equipo  Catia Jewel y agradecemos amanda comentarios  Si recibe naif encuesta nuestra, tómese unos momentos para informarnos cómo estamos     Sinceramente,  SILVIA Fleming

## 2022-07-15 ENCOUNTER — OFFICE VISIT (OUTPATIENT)
Dept: OBGYN CLINIC | Facility: CLINIC | Age: 22
End: 2022-07-15

## 2022-07-15 VITALS
BODY MASS INDEX: 40.4 KG/M2 | HEART RATE: 69 BPM | WEIGHT: 242.8 LBS | DIASTOLIC BLOOD PRESSURE: 76 MMHG | SYSTOLIC BLOOD PRESSURE: 111 MMHG

## 2022-07-15 DIAGNOSIS — Z30.431 IUD CHECK UP: Primary | ICD-10-CM

## 2022-07-15 PROBLEM — O34.219 MATERNAL CARE FOR SCAR FROM PREVIOUS CESAREAN DELIVERY: Status: RESOLVED | Noted: 2020-11-11 | Resolved: 2022-07-15

## 2022-07-15 PROBLEM — Z98.891 STATUS POST REPEAT LOW TRANSVERSE CESAREAN SECTION: Status: RESOLVED | Noted: 2021-03-29 | Resolved: 2022-07-15

## 2022-07-15 PROBLEM — E66.812 CLASS 2 OBESITY WITHOUT SERIOUS COMORBIDITY WITH BODY MASS INDEX (BMI) OF 38.0 TO 38.9 IN ADULT: Status: RESOLVED | Noted: 2020-02-06 | Resolved: 2022-07-15

## 2022-07-15 PROBLEM — Z98.891 HISTORY OF CESAREAN SECTION: Status: RESOLVED | Noted: 2021-03-18 | Resolved: 2022-07-15

## 2022-07-15 PROBLEM — E66.9 CLASS 2 OBESITY WITHOUT SERIOUS COMORBIDITY WITH BODY MASS INDEX (BMI) OF 38.0 TO 38.9 IN ADULT: Status: RESOLVED | Noted: 2020-02-06 | Resolved: 2022-07-15

## 2022-07-15 PROCEDURE — 99212 OFFICE O/P EST SF 10 MIN: CPT | Performed by: NURSE PRACTITIONER

## 2022-07-15 NOTE — PATIENT INSTRUCTIONS
Bhanu por cummings confianza en nuestro equipo  Lizeth Daubs y agradecemos amanda comentarios  Si recibe naif encuesta nuestra, tómese unos momentos para informarnos cómo estamos     Sinceramente,  SILVIA Kc

## 2022-07-15 NOTE — PROGRESS NOTES
Susy Mead presents today for IUD check  She had Mirena IUD inserted 6/17/2022  She reports some irregular vaginal bleeding since then and mild uterine cramping  On exam, IUD strings are noted protruding from cervical os appropriately positioned  Return as previously scheduled for annual GYN exam in 9/2022

## 2022-09-26 ENCOUNTER — ANNUAL EXAM (OUTPATIENT)
Dept: OBGYN CLINIC | Facility: CLINIC | Age: 22
End: 2022-09-26

## 2022-09-26 VITALS
WEIGHT: 248 LBS | HEIGHT: 65 IN | HEART RATE: 86 BPM | SYSTOLIC BLOOD PRESSURE: 106 MMHG | DIASTOLIC BLOOD PRESSURE: 71 MMHG | BODY MASS INDEX: 41.32 KG/M2

## 2022-09-26 DIAGNOSIS — Z01.419 ENCOUNTER FOR ANNUAL ROUTINE GYNECOLOGICAL EXAMINATION: Primary | ICD-10-CM

## 2022-09-26 PROCEDURE — 99395 PREV VISIT EST AGE 18-39: CPT | Performed by: NURSE PRACTITIONER

## 2022-09-26 NOTE — PATIENT INSTRUCTIONS
Call with needs or concerns  Follow up with Family doctor for nausea, vomiting and dizziness  Return in 1 year    COVID-19 Instructions    If you are having any of the following:  Cough   Shortness of breath   Fever  If traveled within past 2 weeks internationally or to high risk [place] states  Or been in contact with someone that has     Please call either:   Your PCP office  [phone], option [phone]    They will screen you over the phone and direct you to the nearest appropriate testing location    DO NOT go to your PCP or OB office without calling first       Xenia Olguin

## 2022-09-26 NOTE — PROGRESS NOTES
Annual Exam    Assessment   1  Encounter for annual routine gynecological examination       well woman       Plan       Await pap smear results  Breast self exam technique reviewed and patient encouraged to perform self-exam monthly  Contraception: IUD  Discussed healthy lifestyle modifications  Follow up in 1 year  Patient Instructions   Call with needs or concerns  Follow up with Family doctor for nausea, vomiting and dizziness  Return in 1 year  Pt verbalized understanding of all discussed  Subjective      Crismamitaliin Rhiannon is a 23 y o   female who presents for annual well woman exam  Periods are not occurring with Mirena IUD, lasting 0 days  No intermenstrual bleeding, spotting, or discharge  Last WNL PAP 2020  1 partner x 3 years, denies domestic vilence  Pt states she is breastfeeding her 21 month old child  Pt also states x 2 weeks she has noted N/V and dizziness, pt states she is able to eat and drink daily and retain something    Explained WNL IUD string check  Pregnancy test was negative, advised to see Family doctor for N/V amd dizziness  Explained UPT was negative    Depression Screening Follow-up Plan: Patient's depression screening was negative with a PHQ-2 score of 1  Their PHQ-9 score was 2  Clinically patient does not have depression  No treatment is required  BMI Counseling: Body mass index is 41 27 kg/m²  The BMI is above normal  Nutrition recommendations include reducing portion sizes, decreasing overall calorie intake, consuming healthier snacks, moderation in carbohydrate intake and increasing intake of lean protein  Exercise recommendations include moderate aerobic physical activity for 150 minutes/week        Current contraception: IUD  History of abnormal Pap smear: no  Family history of uterine or ovarian cancer: no  Regular self breast exam: yes  History of abnormal mammogram: N/A  Family history of breast cancer: no  History of abnormal lipids: unknown  Menstrual History:  OB History        3    Para   3    Term   2       1    AB        Living   2       SAB        IAB        Ectopic        Multiple   0    Live Births   1                Menarche age: 13  Patient's last menstrual period was 08/15/2022 (approximate)  Pt has a Mirena IUN        The following portions of the patient's history were reviewed and updated as appropriate: allergies, current medications, past family history, past medical history, past social history, past surgical history and problem list     Review of Systems  Pertinent items are noted in HPI        Objective      /71   Pulse 86   Ht 5' 5" (1 651 m)   Wt 112 kg (248 lb)   LMP 08/15/2022 (Approximate)   BMI 41 27 kg/m²     General: alert and oriented, in no acute distress, alert, appears stated age and cooperative   Heart: regular rate and rhythm, S1, S2 normal, no murmur, click, rub or gallop   Lungs: clear to auscultation bilaterally, WNL respiratory effort, negative cough or SOB   Thyroid: Negative masses   Abdomen: soft, non-tender, without masses or organomegaly palpable   Vulva: normal   Vagina: normal mucosa   Cervix: no cervical motion tenderness, no lesions and Mirena string was noted at cervical os   Uterus: normal size, non-tender, normal shape and consistency   Adnexa: normal adnexa   Urethra: normal   Breasts: NT,negative masses, discharge, or dimpling

## 2022-11-29 NOTE — ED PROVIDER NOTES
Chief Complaint   Patient presents with    Follow-up       HPI: Kevin Levine  presents for evaluation and management of facial abscess, blood in the stool, neck pain and elevated blood pressure. Nelly notes that he went to the plastic surgery clinic and is planning to have the abscess on his right jaw is removed. States it has stopped draining. He tells me that he continues to have intermittent blood in his stool. Most recently about 1 week ago. He has yet to schedule follow-up with gastroenterology. He notes his neck pain is significantly improved. He missed his last appointment with physical therapy but notes that it has been helping. Review of Systems    Allergies   Allergen Reactions    Other      Shell fish    Pork (Porcine) Protein     Shellfish-Derived Products      New Prescriptions    AMLODIPINE (NORVASC) 5 MG TABLET    Take 1 tablet by mouth daily     Current Outpatient Medications   Medication Sig Dispense Refill    amLODIPine (NORVASC) 5 MG tablet Take 1 tablet by mouth daily 30 tablet 3    sulfamethoxazole-trimethoprim (BACTRIM DS;SEPTRA DS) 800-160 MG per tablet Take 1 tablet by mouth 2 times daily for 7 days 14 tablet 0    medical marijuana Take 1 each by mouth as needed. fluticasone-salmeterol (ADVAIR DISKUS) 100-50 MCG/ACT AEPB diskus inhaler Inhale 1 puff into the lungs in the morning and 1 puff in the evening. 1 each 5    ATROVENT HFA 17 MCG/ACT inhaler       ketorolac (TORADOL) 15 MG/ML injection Inject 2 mLs into the muscle once for 1 dose 2 mL 0     No current facility-administered medications for this visit.        Past Medical History:   Diagnosis Date    Allergic rhinitis     Asthma     Influenza B 02/23/2017         Objective   /72   Pulse (!) 108   Temp 97.1 °F (36.2 °C)   Ht 6' 1\" (1.854 m)   Wt 181 lb (82.1 kg)   SpO2 97%   BMI 23.88 kg/m²   Wt Readings from Last 3 Encounters:   11/29/22 181 lb (82.1 kg)   11/23/22 171 lb (77.6 kg)   11/08/22 171 lb (77.6 History  Chief Complaint   Patient presents with   Calli Al Fall     Patient 34 weeks pregnant, reports having a fall on Saturday and landing on her butt  Reporting right hip pain and pelvic pain  Denies vaginal discharge/bleeidng  Patient is a 24 y/o female,  at 29 weeks gestation, presenting to the ED for evaluation of fall  Pt states on Saturday night she slipped, falling onto her buttock  Pt states she initially had lower back pain, resolved  Pt reports today she has had pelvic pain and feeling "contractions" every 30 minutes  Pt reports white discharge today  Pt did not take any medication today  Pt called her OBGYN who referred patient to the ED  Pt without head injury, LOC, fever, neck pain, bladder/bowel incontinence, urinary retention, perianal numbness, hip pain, knee pain, shoulder pain, abrasion/laceration  Prior to Admission Medications   Prescriptions Last Dose Informant Patient Reported? Taking? Prenatal Vit-Fe Fumarate-FA (Prenatal/Folic Acid) TABS  Self No No   Sig: Take 1 tablet by mouth daily   Pyridoxine HCl (vitamin B-6) 25 MG tablet  Self No No   Sig: Take 1 tablet (25 mg total) by mouth 3 (three) times a day   aspirin (ECOTRIN LOW STRENGTH) 81 mg EC tablet  Self No No   Sig: Take 2 tablets (162 mg total) by mouth daily   calcium carbonate (TUMS) 500 mg chewable tablet  Self No No   Sig: Chew 2 tablets (1,000 mg total) 3 (three) times a day   doxylamine (UNISON) 25 MG tablet  Self No No   Sig: Take 0 5 tablets (12 5 mg total) by mouth daily at bedtime as needed for nausea   ferrous sulfate 324 (65 Fe) mg  Self No No   Sig: Take 1 tablet (324 mg total) by mouth 2 (two) times a day before meals      Facility-Administered Medications: None       History reviewed  No pertinent past medical history      Past Surgical History:   Procedure Laterality Date     SECTION      2       Family History   Problem Relation Age of Onset    No Known Problems Mother     No Known Problems Father     No Known Problems Sister     No Known Problems Brother     No Known Problems Daughter     Cancer Neg Hx     Diabetes Neg Hx      I have reviewed and agree with the history as documented  E-Cigarette/Vaping    E-Cigarette Use Never User      E-Cigarette/Vaping Substances     Social History     Tobacco Use    Smoking status: Never Smoker    Smokeless tobacco: Never Used   Substance Use Topics    Alcohol use: No    Drug use: No       Review of Systems   Constitutional: Negative for chills and fever  HENT: Negative for ear pain and sore throat  Eyes: Negative for redness  Respiratory: Negative for chest tightness and shortness of breath  Cardiovascular: Negative for chest pain, palpitations and leg swelling  Gastrointestinal: Positive for abdominal pain  Negative for diarrhea, nausea and vomiting  Genitourinary: Positive for pelvic pain and vaginal discharge  Negative for dysuria, flank pain, hematuria and vaginal bleeding  Musculoskeletal: Positive for back pain (resolved )  Negative for neck pain  Skin: Negative for rash  Neurological: Negative for weakness and headaches  Psychiatric/Behavioral: Negative for confusion  Physical Exam  Physical Exam  Constitutional:       General: She is not in acute distress  Appearance: She is well-developed  She is not ill-appearing, toxic-appearing or diaphoretic  HENT:      Head: Normocephalic and atraumatic  Right Ear: External ear normal       Left Ear: External ear normal       Nose: Nose normal       Mouth/Throat:      Lips: Pink  Mouth: Mucous membranes are moist    Eyes:      Extraocular Movements: Extraocular movements intact  Conjunctiva/sclera: Conjunctivae normal    Neck:      Musculoskeletal: Normal range of motion and neck supple  No spinous process tenderness or muscular tenderness  Cardiovascular:      Rate and Rhythm: Normal rate and regular rhythm     Pulmonary:      Effort: Pulmonary effort kg)       Physical Exam  Constitutional:       Appearance: He is well-developed. HENT:      Head:      Comments: 2 discrete small subcutaneous nodules at the angle of right jaw without discharge. Cardiovascular:      Rate and Rhythm: Normal rate and regular rhythm. Heart sounds: No murmur heard. No friction rub. No gallop. Pulmonary:      Effort: Pulmonary effort is normal.      Breath sounds: Normal breath sounds. No wheezing or rales. Abdominal:      General: Bowel sounds are normal. There is no distension. Palpations: Abdomen is soft. There is no mass. Tenderness: There is no abdominal tenderness. Skin:     General: Skin is warm and dry. Findings: No rash. Chemistry        Component Value Date/Time     06/19/2020 1041    K 4.3 06/19/2020 1041     06/19/2020 1041    CO2 23 06/19/2020 1041    BUN 18 06/19/2020 1041    CREATININE 1.0 06/19/2020 1041        Component Value Date/Time    CALCIUM 9.1 06/19/2020 1041    ALKPHOS 41 12/12/2017 1939    AST 21 12/12/2017 1939    ALT 15 12/12/2017 1939    BILITOT 0.3 12/12/2017 1939          Lab Results   Component Value Date    WBC 6.6 06/19/2020    HGB 15.3 06/19/2020    HCT 45.3 06/19/2020    MCV 94.6 06/19/2020     06/19/2020     No results found for: LABA1C  No results found for: EAG  No results found for: LABA1C  No components found for: CHLPL  No results found for: TRIG  No results found for: HDL  No results found for: LDLCALC  No results found for: LABVLDL      Assessment   Plan   1. Abscess of face  Appears clinically improved. Less inflamed. Counseled patient to follow-up with surgery for removal    2. Blood in stool  Encouraged patient to keep follow-up with gastroenterology and gave him their contact information again. Explained importance given risk of colon cancer    3. Essential hypertension  New diagnosis. After discussing risks and benefits of disease with and without medication.   Patient elects is normal       Breath sounds: Normal breath sounds  No decreased breath sounds, wheezing, rhonchi or rales  Abdominal:      General: Abdomen is flat  There is no distension  Palpations: Abdomen is soft  Tenderness: There is no abdominal tenderness  There is no guarding or rebound  Comments: FHT 150bpm   Musculoskeletal:      Cervical back: Normal       Thoracic back: Normal       Lumbar back: Normal       Comments: No pelvic instability  Full ROM all extremities   Skin:     General: Skin is warm  Capillary Refill: Capillary refill takes less than 2 seconds  Coloration: Skin is not jaundiced or pale  Findings: No rash  Neurological:      Mental Status: She is alert and oriented to person, place, and time     Psychiatric:         Mood and Affect: Mood and affect normal          Speech: Speech normal          Vital Signs  ED Triage Vitals   Temperature Pulse Respirations Blood Pressure SpO2   02/22/21 1553 02/22/21 1553 02/22/21 1553 02/22/21 1553 02/22/21 1553   97 6 °F (36 4 °C) 92 18 133/64 98 %      Temp Source Heart Rate Source Patient Position - Orthostatic VS BP Location FiO2 (%)   02/22/21 1553 02/22/21 1553 02/22/21 1553 02/22/21 1553 --   Oral Monitor Lying Right arm       Pain Score       02/22/21 1633       5           Vitals:    02/22/21 1553   BP: 133/64   Pulse: 92   Patient Position - Orthostatic VS: Lying         Visual Acuity      ED Medications  Medications - No data to display    Diagnostic Studies  Results Reviewed     None                 No orders to display              Procedures  Procedures         ED Course  ED Course as of Feb 22 1732   Mon Feb 22, 2021   1638 [id number] [place] text to OBGYN                                              MDM  Number of Diagnoses or Management Options  Back pain: new and does not require workup  Fall, initial encounter: new and does not require workup  Pelvic pain: new and requires workup  Diagnosis management to start amlodipine and we will follow-up in 3 months  - amLODIPine (NORVASC) 5 MG tablet; Take 1 tablet by mouth daily  Dispense: 30 tablet; Refill: 3    4. Neck pain  Controlled: Appears stable. We will continue current management and monitor for adverse reaction and disease progression. Follow-up as noted below          RTC Return in about 3 months (around 2/28/2023). comments: Patient is a 22 y/o female,  at 29 weeks gestation, presenting to the ED for evaluation of fall  Pt states on Saturday night she slipped, falling onto her buttock  Pt states she initially had lower back pain, resolved  Pt reports today she has had pelvic pain and feeling "contractions" every 30 minutes  Pt reports white discharge today  Pt did not take any medication today  Pt called her OBGYN who referred patient to the ED  FHT 150bpm  Patient medically cleared from fall standpoint, no point tenderness on exam  No head injury, LOC or neck pain  Patient reporting contraction sensation q30 minutes  Jenera text to OBGYN, patient sent to L&D for evaluation and further monitoring  Patient verbalizes understanding and agrees with plan  The management plan was discussed in detail with the patient at bedside and all questions were answered  Prior to discharge, I provided both verbal and written instructions  I discussed with the patient the signs and symptoms for which to return to the emergency department  All questions were answered and patient was comfortable with the plan of care and discharged to home  The patient agrees to return to the Emergency Department for concerns and/or progression of illness  Disposition  Final diagnoses:   Fall, initial encounter   Pelvic pain   Back pain     Time reflects when diagnosis was documented in both MDM as applicable and the Disposition within this note     Time User Action Codes Description Comment    2021  4:58 PM Stephenson Form Add [P53  Namrata Barajas Fall, initial encounter     2021  4:58 PM Stephenson Form Add [R10 2] Pelvic pain     2021  4:58 PM Stephenson Form Add [M54 9] Back pain       ED Disposition     ED Disposition Condition Date/Time Comment    Send to L&D After Provider Kashif Dela Cruz 2021  4:58 PM       Follow-up Information     Follow up With Specialties Details Why Contact Info Additional  [facility] Emergency Medicine Go to  If symptoms worsen [place] [id number]  [address] [facility] Emergency Department, [address]  , [place], [place], [address]          Current Discharge Medication List      CONTINUE these medications which have NOT CHANGED    Details   aspirin (ECOTRIN LOW STRENGTH) 81 mg EC tablet Take 2 tablets (162 mg total) by mouth daily  Qty: 180 tablet, Refills: 3    Associated Diagnoses: Obesity during pregnancy in first trimester      calcium carbonate (TUMS) 500 mg chewable tablet Chew 2 tablets (1,000 mg total) 3 (three) times a day  Qty: 60 tablet, Refills: 3    Associated Diagnoses: Gastroesophageal reflux disease without esophagitis      doxylamine (UNISON) 25 MG tablet Take 0 5 tablets (12 5 mg total) by mouth daily at bedtime as needed for nausea  Qty: 30 tablet, Refills: 0    Associated Diagnoses: 8 weeks gestation of pregnancy      ferrous sulfate 324 (65 Fe) mg Take 1 tablet (324 mg total) by mouth 2 (two) times a day before meals  Qty: 60 tablet, Refills: 3    Associated Diagnoses: Anemia during pregnancy in third trimester      Prenatal Vit-Fe Fumarate-FA (Prenatal/Folic Acid) TABS Take 1 tablet by mouth daily  Qty: 30 each, Refills: 8    Associated Diagnoses: 8 weeks gestation of pregnancy      Pyridoxine HCl (vitamin B-6) 25 MG tablet Take 1 tablet (25 mg total) by mouth 3 (three) times a day  Qty: 90 tablet, Refills: 3    Associated Diagnoses: 8 weeks gestation of pregnancy           No discharge procedures on file      PDMP Review     None          ED Provider  Electronically Signed by           Rachael Harrison PA-C  02/22/21 4920

## 2022-12-11 ENCOUNTER — HOSPITAL ENCOUNTER (EMERGENCY)
Facility: HOSPITAL | Age: 22
Discharge: HOME | End: 2022-12-11
Attending: EMERGENCY MEDICINE

## 2022-12-11 VITALS
SYSTOLIC BLOOD PRESSURE: 141 MMHG | RESPIRATION RATE: 18 BRPM | TEMPERATURE: 98.1 F | BODY MASS INDEX: 43.47 KG/M2 | DIASTOLIC BLOOD PRESSURE: 80 MMHG | OXYGEN SATURATION: 98 % | HEART RATE: 98 BPM | WEIGHT: 261.25 LBS

## 2022-12-11 DIAGNOSIS — J02.0 STREP PHARYNGITIS: Primary | ICD-10-CM

## 2022-12-11 RX ORDER — AMOXICILLIN 250 MG/1
500 CAPSULE ORAL ONCE
Status: COMPLETED | OUTPATIENT
Start: 2022-12-11 | End: 2022-12-11

## 2022-12-11 RX ORDER — AMOXICILLIN 500 MG/1
1000 CAPSULE ORAL DAILY
Qty: 20 CAPSULE | Refills: 0 | Status: SHIPPED | OUTPATIENT
Start: 2022-12-11 | End: 2022-12-21

## 2022-12-11 RX ADMIN — AMOXICILLIN 500 MG: 250 CAPSULE ORAL at 23:19

## 2022-12-11 NOTE — Clinical Note
Gus Jurado was seen and treated in our emergency department on 12/11/2022  No restrictions            Diagnosis:     Gus  may return to work on return date  She may return on this date: 12/14/2022         If you have any questions or concerns, please don't hesitate to call        Jose Angel Bagley MD    ______________________________           _______________          _______________  Hospital Representative                              Date                                Time

## 2022-12-12 NOTE — ED PROVIDER NOTES
History  Chief Complaint   Patient presents with   • Nose Bleed     Patient reports she started 10 minutes ago with a nosebleed in left nostril that is running down back of throat  Negative thinners  History provided by:  Patient  Nose Bleed  Location:  L nare  Severity:  Mild  Duration:  10 minutes  Timing:  Constant  Progression:  Resolved  Chronicity:  New  Relieved by:  Nothing  Worsened by:  Nothing  Associated symptoms: fever and sore throat    Associated symptoms: no blood in oropharynx, no congestion, no cough, no dizziness, no headaches and no sinus pain    Fever:     Duration:  2 days  Sore throat:     Severity:  Moderate    Onset quality:  Gradual    Duration:  2 days    Timing:  Constant    Progression:  Worsening      Prior to Admission Medications   Prescriptions Last Dose Informant Patient Reported? Taking?    D-Vi-Sol 10 MCG/ML LIQD   Yes No   Patient not taking: No sig reported   Levonorgestrel (MIRENA) 20 MCG/DAY IUD   No No   Si Intra Uterine Device by Intrauterine route once for 1 dose   Prenatal Vit-Fe Fumarate-FA (Prenatal/Folic Acid) TABS   No No   Sig: Take 1 tablet by mouth daily   acetaminophen (TYLENOL) 325 mg tablet   No No   Sig: Take 2 tablets (650 mg total) by mouth every 6 (six) hours as needed for moderate pain, headaches or fever   Patient not taking: No sig reported   calcium carbonate (TUMS) 500 mg chewable tablet   No No   Sig: Chew 2 tablets (1,000 mg total) 3 (three) times a day   Patient not taking: No sig reported   docusate sodium (COLACE) 100 mg capsule   No No   Sig: Take 1 capsule (100 mg total) by mouth 2 (two) times a day   ferrous sulfate 324 (65 Fe) mg   No No   Sig: Take 1 tablet (324 mg total) by mouth 2 (two) times a day before meals   ibuprofen (MOTRIN) 600 mg tablet   No No   Sig: Take 1 tablet (600 mg total) by mouth every 6 (six) hours as needed for moderate pain   Patient not taking: No sig reported      Facility-Administered Medications: None Past Medical History:   Diagnosis Date   • Chlamydia        Past Surgical History:   Procedure Laterality Date   •  SECTION      3   • SD  DELIVERY ONLY N/A 3/29/2021    Procedure:  SECTION () REPEAT;  Surgeon: Fernando Sue MD;  [facility];  Service: Obstetrics       Family History   Problem Relation Age of Onset   • No Known Problems Mother    • No Known Problems Father    • No Known Problems Sister    • No Known Problems Brother    • No Known Problems Daughter    • No Known Problems Maternal Grandmother    • No Known Problems Maternal Grandfather    • Ovarian cancer Paternal Grandmother    • Cancer Neg Hx    • Diabetes Neg Hx      I have reviewed and agree with the history as documented  E-Cigarette/Vaping   • E-Cigarette Use Never User      E-Cigarette/Vaping Substances   • Nicotine No    • THC No    • CBD No    • Flavoring No    • Other No    • Unknown No      Social History     Tobacco Use   • Smoking status: Never   • Smokeless tobacco: Never   Vaping Use   • Vaping Use: Never used   Substance Use Topics   • Alcohol use: No   • Drug use: No       Review of Systems   Constitutional: Positive for chills and fever  Negative for activity change, appetite change and fatigue  HENT: Positive for nosebleeds and sore throat  Negative for congestion, dental problem, ear pain, rhinorrhea, sinus pain, trouble swallowing and voice change  Eyes: Negative for pain and redness  Respiratory: Negative for cough, chest tightness, shortness of breath and wheezing  Cardiovascular: Negative for chest pain and palpitations  Gastrointestinal: Negative for abdominal pain, blood in stool, constipation, diarrhea, nausea and vomiting  Endocrine: Negative for cold intolerance and heat intolerance  Genitourinary: Negative for dysuria, frequency and hematuria  Musculoskeletal: Negative for arthralgias, myalgias, neck pain and neck stiffness     Skin: Negative for color change, pallor and rash  Neurological: Negative for dizziness, weakness, numbness and headaches  Hematological: Does not bruise/bleed easily  Psychiatric/Behavioral: Negative for agitation, hallucinations and suicidal ideas  Physical Exam  Physical Exam  Vitals and nursing note reviewed  Constitutional:       Appearance: Normal appearance  HENT:      Right Ear: Tympanic membrane normal       Left Ear: Tympanic membrane normal       Nose: No congestion or rhinorrhea  Comments: No active bleeding  No evidence of posterior epistaxis  No visible blood vessels  Inflamed irritated nasal mucosa in the left     Mouth/Throat:      Pharynx: Oropharyngeal exudate (midline uvula, nml voice, tolerating secretions) and posterior oropharyngeal erythema present  Eyes:      General: No scleral icterus  Right eye: No discharge  Left eye: No discharge  Cardiovascular:      Rate and Rhythm: Normal rate and regular rhythm  Pulmonary:      Effort: Pulmonary effort is normal       Breath sounds: Normal breath sounds  Abdominal:      General: There is no distension  Palpations: There is no mass  Tenderness: There is no abdominal tenderness  There is no right CVA tenderness or left CVA tenderness  Hernia: No hernia is present  Musculoskeletal:      Cervical back: Normal range of motion and neck supple  No rigidity  Lymphadenopathy:      Cervical: Cervical adenopathy (tender anterior) present  Neurological:      Mental Status: She is alert           Vital Signs  ED Triage Vitals [12/11/22 2141]   Temperature Pulse Respirations Blood Pressure SpO2   98 1 °F (36 7 °C) 98 18 141/80 98 %      Temp Source Heart Rate Source Patient Position - Orthostatic VS BP Location FiO2 (%)   Oral Monitor Lying Left arm --      Pain Score       --           Vitals:    12/11/22 2141   BP: 141/80   Pulse: 98   Patient Position - Orthostatic VS: Lying         Visual Acuity      ED Medications  Medications   amoxicillin (AMOXIL) capsule 500 mg (has no administration in time range)       Diagnostic Studies  Results Reviewed     None                 No orders to display              Procedures  Procedures         ED Course                                             MDM  Number of Diagnoses or Management Options  Strep pharyngitis  Diagnosis management comments: Epistaxis resolved we will treat with Ocean nasal spray, minified air  Reassurance  Strep throat  Will treat with antibiotics  Disposition  Final diagnoses:   Strep pharyngitis     Time reflects when diagnosis was documented in both MDM as applicable and the Disposition within this note     Time User Action Codes Description Comment    12/11/2022 10:59 PM Judy Aj Add [J02 0] Strep pharyngitis       ED Disposition     ED Disposition   Discharge    Condition   Stable    Date/Time   Sun Dec 11, 2022 10:59 PM    Comment   Crismaylin Rhiannon discharge to home/self care  Follow-up Information    None         Patient's Medications   Discharge Prescriptions    AMOXICILLIN (AMOXIL) 500 MG CAPSULE    Take 2 capsules (1,000 mg total) by mouth daily for 10 days       Start Date: 12/11/2022End Date: 12/21/2022       Order Dose: 1,000 mg       Quantity: 20 capsule    Refills: 0    SODIUM CHLORIDE (OCEAN) 0 65 % NASAL SPRAY    2 sprays into each nostril as needed for congestion for up to 5 days       Start Date: 12/11/2022End Date: 12/16/2022       Order Dose: 2 sprays       Quantity: 15 mL    Refills: 0       No discharge procedures on file      PDMP Review       Value Time User    PDMP Reviewed  Yes 2/23/2021  7:13 AM Milan Crews MD          ED Provider  Electronically Signed by           Chi Hyatt MD  12/11/22 1211

## 2023-06-05 ENCOUNTER — OFFICE VISIT (OUTPATIENT)
Dept: FAMILY MEDICINE CLINIC | Facility: CLINIC | Age: 23
End: 2023-06-05

## 2023-06-05 VITALS
TEMPERATURE: 97.8 F | HEIGHT: 65 IN | SYSTOLIC BLOOD PRESSURE: 118 MMHG | WEIGHT: 270 LBS | BODY MASS INDEX: 44.98 KG/M2 | DIASTOLIC BLOOD PRESSURE: 74 MMHG | OXYGEN SATURATION: 99 % | HEART RATE: 85 BPM | RESPIRATION RATE: 18 BRPM

## 2023-06-05 DIAGNOSIS — Z76.89 ENCOUNTER TO ESTABLISH CARE: Primary | ICD-10-CM

## 2023-06-05 DIAGNOSIS — R19.09 UMBILICAL SWELLING: ICD-10-CM

## 2023-06-05 PROBLEM — L63.9 ALOPECIA AREATA: Status: ACTIVE | Noted: 2023-06-05

## 2023-06-05 PROCEDURE — 99204 OFFICE O/P NEW MOD 45 MIN: CPT

## 2023-06-05 NOTE — ASSESSMENT & PLAN NOTE
Patient reports intermittent swelling of area above the umbilicus after pregnancy; patient concerned for hernia  No mass or swelling present at the time of visit     Limited US -future

## 2023-06-05 NOTE — PROGRESS NOTES
Name: Carol Rodriguez      : 2000      MRN: [id number]  Encounter Provider: SILVIA Hay  Encounter Date: 2023   Encounter department: [address] [place] [address]     1  Encounter to establish care  Assessment & Plan:  Patient is well appearing, no areas of concern   Follow up Prn       2  BMI 40 0-44 9, adult [facility])  -     Ambulatory Referral to Weight Management; Future  -     CBC and differential; Future  -     Comprehensive metabolic panel; Future  -     Hemoglobin A1C; Future  -     Lipid panel; Future  -     TSH, 3rd generation with Free T4 reflex; Future    3  Umbilical swelling  Assessment & Plan:  Patient reports intermittent swelling of area above the umbilicus after pregnancy; patient concerned for hernia  No mass or swelling present at the time of visit  Limited US -future    Orders:  -     US MSK limited; Future; Expected date: 2023    BMI Counseling: Body mass index is 44 93 kg/m²  The BMI is above normal  Nutrition recommendations include decreasing portion sizes, encouraging healthy choices of fruits and vegetables, decreasing fast food intake, consuming healthier snacks, limiting drinks that contain sugar, moderation in carbohydrate intake, increasing intake of lean protein, reducing intake of saturated and trans fat and reducing intake of cholesterol  Pharmacotherapy was ordered to help aid in weight loss  Patient referred to weight management  Rationale for BMI follow-up plan is due to patient being overweight or obese  Depression Screening and Follow-up Plan: Patient was screened for depression during today's encounter  They screened negative with a PHQ-2 score of 0  Subjective      Crismaylin Rhiannon 23 y o  female has a past medical history of Chlamydia (2018)  Presenting today to re-establish care  Patient last seen in the office in    Current main concern is weight loss, requesting to see weight management  Reports dieting and exercising with no significant weight change  Denies fatigue, headaches, dizziness, blurred vision, nausea, palpitation, chest pain, SOB, urinary changes, weakness, bowel changes, sleep problems,  sick contacts, red flag signs,  or recent travel  Overall patient reports feeling well   Patient has no further complaints other than what is mentioned in the ROS  Review of Systems   Constitutional: Negative for chills and fever  HENT: Negative for ear pain and sore throat  Eyes: Negative for pain and visual disturbance  Respiratory: Negative for cough and shortness of breath  Cardiovascular: Negative for chest pain and palpitations  Gastrointestinal: Negative for abdominal pain and vomiting  Genitourinary: Negative for dysuria and hematuria  Musculoskeletal: Negative for arthralgias and back pain  Skin: Negative for color change and rash  Neurological: Negative for seizures and syncope  All other systems reviewed and are negative        Current Outpatient Medications on File Prior to Visit   Medication Sig   • docusate sodium (COLACE) 100 mg capsule Take 1 capsule (100 mg total) by mouth 2 (two) times a day   • ferrous sulfate 324 (65 Fe) mg Take 1 tablet (324 mg total) by mouth 2 (two) times a day before meals   • Prenatal Vit-Fe Fumarate-FA (Prenatal/Folic Acid) TABS Take 1 tablet by mouth daily   • [DISCONTINUED] acetaminophen (TYLENOL) 325 mg tablet Take 2 tablets (650 mg total) by mouth every 6 (six) hours as needed for moderate pain, headaches or fever (Patient not taking: No sig reported)   • [DISCONTINUED] calcium carbonate (TUMS) 500 mg chewable tablet Chew 2 tablets (1,000 mg total) 3 (three) times a day (Patient not taking: No sig reported)   • [DISCONTINUED] D-Vi-Sol 10 MCG/ML LIQD  (Patient not taking: No sig reported)   • [DISCONTINUED] ibuprofen (MOTRIN) 600 mg tablet Take 1 tablet (600 mg total) by mouth every 6 (six) hours as needed for "moderate pain (Patient not taking: No sig reported)   • [DISCONTINUED] Levonorgestrel (MIRENA) 20 MCG/DAY IUD 1 Intra Uterine Device by Intrauterine route once for 1 dose   • [DISCONTINUED] sodium chloride (OCEAN) 0 65 % nasal spray 2 sprays into each nostril as needed for congestion for up to 5 days       Objective     /74 (BP Location: Left arm, Patient Position: Sitting, Cuff Size: Large)   Pulse 85   Temp 97 8 °F (36 6 °C) (Temporal)   Resp 18   Ht 5' 5\" (1 651 m)   Wt 122 kg (270 lb)   LMP 05/10/2023 (Exact Date)   SpO2 99%   Breastfeeding No   BMI 44 93 kg/m²     Physical Exam  Vitals and nursing note reviewed  Constitutional:       General: She is not in acute distress  Appearance: Normal appearance  She is not ill-appearing  HENT:      Head: Normocephalic and atraumatic  Right Ear: Tympanic membrane, ear canal and external ear normal       Left Ear: Tympanic membrane, ear canal and external ear normal       Nose: Nose normal       Mouth/Throat:      Mouth: Mucous membranes are moist    Eyes:      General:         Right eye: No discharge  Left eye: No discharge  Pupils: Pupils are equal, round, and reactive to light  Cardiovascular:      Rate and Rhythm: Normal rate and regular rhythm  Pulses: Normal pulses  Heart sounds: Normal heart sounds  Pulmonary:      Effort: Pulmonary effort is normal  No respiratory distress  Breath sounds: Normal breath sounds  No wheezing  Abdominal:      General: Bowel sounds are normal       Palpations: Abdomen is soft  Tenderness: There is no abdominal tenderness  There is no right CVA tenderness or left CVA tenderness  Musculoskeletal:         General: Normal range of motion  Cervical back: Normal range of motion  Skin:     General: Skin is warm and dry  Neurological:      General: No focal deficit present  Mental Status: She is alert and oriented to person, place, and time         Quita Junes " Ezekiel Moralez

## 2023-06-09 ENCOUNTER — LAB (OUTPATIENT)
Dept: LAB | Facility: CLINIC | Age: 23
End: 2023-06-09

## 2023-06-09 LAB
ALBUMIN SERPL BCP-MCNC: 3.6 G/DL (ref 3.5–5)
ALP SERPL-CCNC: 76 U/L (ref 46–116)
ALT SERPL W P-5'-P-CCNC: 24 U/L (ref 12–78)
ANION GAP SERPL CALCULATED.3IONS-SCNC: 2 MMOL/L (ref 4–13)
AST SERPL W P-5'-P-CCNC: 14 U/L (ref 5–45)
BASOPHILS # BLD AUTO: 0.05 THOUSANDS/ÂΜL (ref 0–0.1)
BASOPHILS NFR BLD AUTO: 1 % (ref 0–1)
BILIRUB SERPL-MCNC: 0.38 MG/DL (ref 0.2–1)
BUN SERPL-MCNC: 10 MG/DL (ref 5–25)
CALCIUM SERPL-MCNC: 9.1 MG/DL (ref 8.3–10.1)
CHLORIDE SERPL-SCNC: 111 MMOL/L (ref 96–108)
CHOLEST SERPL-MCNC: 130 MG/DL
CO2 SERPL-SCNC: 27 MMOL/L (ref 21–32)
CREAT SERPL-MCNC: 0.7 MG/DL (ref 0.6–1.3)
EOSINOPHIL # BLD AUTO: 0.06 THOUSAND/ÂΜL (ref 0–0.61)
EOSINOPHIL NFR BLD AUTO: 1 % (ref 0–6)
ERYTHROCYTE [DISTWIDTH] IN BLOOD BY AUTOMATED COUNT: 14.8 % (ref 11.6–15.1)
EST. AVERAGE GLUCOSE BLD GHB EST-MCNC: 117 MG/DL
GFR SERPL CREATININE-BSD FRML MDRD: 121 ML/MIN/1.73SQ M
GLUCOSE P FAST SERPL-MCNC: 54 MG/DL (ref 65–99)
HBA1C MFR BLD: 5.7 %
HCT VFR BLD AUTO: 39.9 % (ref 34.8–46.1)
HDLC SERPL-MCNC: 39 MG/DL
HGB BLD-MCNC: 12.1 G/DL (ref 11.5–15.4)
IMM GRANULOCYTES # BLD AUTO: 0.06 THOUSAND/UL (ref 0–0.2)
IMM GRANULOCYTES NFR BLD AUTO: 1 % (ref 0–2)
LDLC SERPL CALC-MCNC: 79 MG/DL (ref 0–100)
LYMPHOCYTES # BLD AUTO: 2.52 THOUSANDS/ÂΜL (ref 0.6–4.47)
LYMPHOCYTES NFR BLD AUTO: 23 % (ref 14–44)
MCH RBC QN AUTO: 23.8 PG (ref 26.8–34.3)
MCHC RBC AUTO-ENTMCNC: 30.3 G/DL (ref 31.4–37.4)
MCV RBC AUTO: 79 FL (ref 82–98)
MONOCYTES # BLD AUTO: 0.72 THOUSAND/ÂΜL (ref 0.17–1.22)
MONOCYTES NFR BLD AUTO: 7 % (ref 4–12)
NEUTROPHILS # BLD AUTO: 7.5 THOUSANDS/ÂΜL (ref 1.85–7.62)
NEUTS SEG NFR BLD AUTO: 67 % (ref 43–75)
NONHDLC SERPL-MCNC: 91 MG/DL
NRBC BLD AUTO-RTO: 0 /100 WBCS
PLATELET # BLD AUTO: 431 THOUSANDS/UL (ref 149–390)
PMV BLD AUTO: 9.1 FL (ref 8.9–12.7)
POTASSIUM SERPL-SCNC: 3.5 MMOL/L (ref 3.5–5.3)
PROT SERPL-MCNC: 7.8 G/DL (ref 6.4–8.4)
RBC # BLD AUTO: 5.08 MILLION/UL (ref 3.81–5.12)
SODIUM SERPL-SCNC: 140 MMOL/L (ref 135–147)
TRIGL SERPL-MCNC: 62 MG/DL
TSH SERPL DL<=0.05 MIU/L-ACNC: 1.32 UIU/ML (ref 0.45–4.5)
WBC # BLD AUTO: 10.91 THOUSAND/UL (ref 4.31–10.16)

## 2023-06-09 PROCEDURE — 83036 HEMOGLOBIN GLYCOSYLATED A1C: CPT

## 2023-06-09 PROCEDURE — 84443 ASSAY THYROID STIM HORMONE: CPT

## 2023-06-09 PROCEDURE — 80053 COMPREHEN METABOLIC PANEL: CPT

## 2023-06-09 PROCEDURE — 36415 COLL VENOUS BLD VENIPUNCTURE: CPT

## 2023-06-09 PROCEDURE — 80061 LIPID PANEL: CPT

## 2023-06-09 PROCEDURE — 85025 COMPLETE CBC W/AUTO DIFF WBC: CPT

## 2023-06-10 DIAGNOSIS — D50.8 IRON DEFICIENCY ANEMIA SECONDARY TO INADEQUATE DIETARY IRON INTAKE: Primary | ICD-10-CM

## 2023-06-10 NOTE — RESULT ENCOUNTER NOTE
CBC no signs of anemia, will place order for iron studies can obtain at earliest convenience  CMP-Liver/kidney function normal   Cholesterol -normal   Thyroid -normal   A1C(diabetes) - 5 7 pre diabetes  Blood sugar shows an elevated hemoglobin A1C  Level was 5 7  This is the average blood sugar over the last 3 months  A normal number is under 5 6  Anything between 5 7 and 6 4 is the pre-diabetes area  I recommend a low carbohydrate diet and low sugar diet  Try to stop drinking sugary drinks such as soda, juice, sports drinks  Decrease carbohydrates such as bread, pasta, rice, potatoes  It is also important to get regular exercise

## 2023-10-06 ENCOUNTER — ANNUAL EXAM (OUTPATIENT)
Dept: OBGYN CLINIC | Facility: CLINIC | Age: 23
End: 2023-10-06

## 2023-10-06 ENCOUNTER — APPOINTMENT (OUTPATIENT)
Dept: LAB | Facility: CLINIC | Age: 23
End: 2023-10-06
Payer: COMMERCIAL

## 2023-10-06 VITALS
HEIGHT: 65 IN | BODY MASS INDEX: 43.32 KG/M2 | DIASTOLIC BLOOD PRESSURE: 72 MMHG | WEIGHT: 260 LBS | HEART RATE: 83 BPM | SYSTOLIC BLOOD PRESSURE: 104 MMHG

## 2023-10-06 DIAGNOSIS — D50.8 IRON DEFICIENCY ANEMIA SECONDARY TO INADEQUATE DIETARY IRON INTAKE: ICD-10-CM

## 2023-10-06 DIAGNOSIS — Z01.419 ENCOUNTER FOR ANNUAL ROUTINE GYNECOLOGICAL EXAMINATION: Primary | ICD-10-CM

## 2023-10-06 LAB
FERRITIN SERPL-MCNC: 41 NG/ML (ref 11–307)
IRON SATN MFR SERPL: 9 % (ref 15–50)
IRON SERPL-MCNC: 30 UG/DL (ref 50–212)
TIBC SERPL-MCNC: 347 UG/DL (ref 250–450)
UIBC SERPL-MCNC: 317 UG/DL (ref 155–355)

## 2023-10-06 PROCEDURE — 82728 ASSAY OF FERRITIN: CPT

## 2023-10-06 PROCEDURE — G0476 HPV COMBO ASSAY CA SCREEN: HCPCS | Performed by: NURSE PRACTITIONER

## 2023-10-06 PROCEDURE — 83550 IRON BINDING TEST: CPT

## 2023-10-06 PROCEDURE — 83540 ASSAY OF IRON: CPT

## 2023-10-06 PROCEDURE — 36415 COLL VENOUS BLD VENIPUNCTURE: CPT

## 2023-10-06 PROCEDURE — G0145 SCR C/V CYTO,THINLAYER,RESCR: HCPCS | Performed by: NURSE PRACTITIONER

## 2023-10-06 PROCEDURE — 99395 PREV VISIT EST AGE 18-39: CPT | Performed by: NURSE PRACTITIONER

## 2023-10-06 NOTE — PROGRESS NOTES
Annual Exam    Assessment   1. Encounter for annual routine gynecological examination  Liquid-based pap, screening        well woman       Plan       All questions answered. Breast self exam technique reviewed and patient encouraged to perform self-exam monthly. Contraception: IUD. Discussed healthy lifestyle modifications. Follow up in 1 year. Thin prep Pap smear. Patient Instructions   [place] [phone]  PAP results can take up to 2 weeks  Call with needs or concerns  Return in 1 year      Subjective      Shellie Lee is a 20 y.o.  female who presents for annual well woman exam. Periods are regular every 28-30 days, lasting 3 days. No intermenstrual bleeding, spotting, or discharge. Last WNL PAP was 2020  1 partner x 4 years. Denies domestic violence    Depression Screening Follow-up Plan: Patient's depression screening was negative with a PHQ-2 score of 0. Their PHQ-9 score was 0. Clinically patient does not have depression. No treatment is required. BMI Counseling: Body mass index is 43.27 kg/m². The BMI is above normal. Nutrition recommendations include reducing portion sizes, decreasing overall calorie intake, 3-5 servings of fruits/vegetables daily, moderation in carbohydrate intake and increasing intake of lean protein. Exercise recommendations include moderate aerobic physical activity for 150 minutes/week. Current contraception: IUD  History of abnormal Pap smear: no  Family history of uterine or ovarian cancer: Vandana Zuniga provided  Regular self breast exam: yes  History of abnormal mammogram: N/A  Family history of breast cancer: no  History of abnormal lipids: unknown  Menstrual History:  OB History        3    Para   3    Term   2       1    AB        Living   2       SAB        IAB        Ectopic        Multiple   0    Live Births   1                Menarche age: 6  Patient's last menstrual period was 2023.        The following portions of the patient's history were reviewed and updated as appropriate: allergies, current medications, past family history, past medical history, past social history, past surgical history and problem list.    Review of Systems  Pertinent items are noted in HPI.       Objective      /72   Pulse 83   Ht 5' 5" (1.651 m)   Wt 118 kg (260 lb)   LMP 09/22/2023   BMI 43.27 kg/m²     General: alert and oriented, in no acute distress, alert, appears stated age and cooperative   Heart: NSR   Lungs: clear to auscultation bilaterally, WNL respiratory effort, negative cough or SOB   Thyroid: Negative masses   Abdomen: soft, non-tender, without masses or organomegaly   Vulva: normal   Vagina: normal mucosa   Cervix: no cervical motion tenderness, no lesions and difficult to visualize due to redundant vaginal tissue   Uterus: normal size, non-tender, normal shape and consistency   Adnexa: normal adnexa   Urethra: normal   Breasts: NT,negative masses, discharge, or dimpling

## 2023-10-06 NOTE — PATIENT INSTRUCTIONS
[facility] [phone]  PAP results can take up to 2 weeks  Call with needs or concerns  Return in 1 year

## 2023-10-10 LAB
HPV HR 12 DNA CVX QL NAA+PROBE: NEGATIVE
HPV16 DNA CVX QL NAA+PROBE: NEGATIVE
HPV18 DNA CVX QL NAA+PROBE: NEGATIVE

## 2023-10-14 LAB
LAB AP GYN PRIMARY INTERPRETATION: NORMAL
Lab: NORMAL

## 2024-01-11 ENCOUNTER — TELEPHONE (OUTPATIENT)
Dept: BARIATRICS | Facility: CLINIC | Age: 24
End: 2024-01-11

## 2024-01-11 NOTE — TELEPHONE ENCOUNTER
Left voice message for patient to call back [phone] to schedule an evaluation phone call appointment with the  to start our bariatric program.

## 2024-03-05 ENCOUNTER — OFFICE VISIT (OUTPATIENT)
Dept: FAMILY MEDICINE CLINIC | Facility: CLINIC | Age: 24
End: 2024-03-05

## 2024-03-05 VITALS
RESPIRATION RATE: 18 BRPM | TEMPERATURE: 97 F | BODY MASS INDEX: 39.02 KG/M2 | OXYGEN SATURATION: 99 % | SYSTOLIC BLOOD PRESSURE: 124 MMHG | DIASTOLIC BLOOD PRESSURE: 76 MMHG | HEART RATE: 87 BPM | HEIGHT: 65 IN | WEIGHT: 234.19 LBS

## 2024-03-05 DIAGNOSIS — D50.8 OTHER IRON DEFICIENCY ANEMIA: ICD-10-CM

## 2024-03-05 DIAGNOSIS — G43.109 MIGRAINE WITH AURA AND WITHOUT STATUS MIGRAINOSUS, NOT INTRACTABLE: Primary | ICD-10-CM

## 2024-03-05 PROBLEM — Z76.89 ENCOUNTER TO ESTABLISH CARE: Status: RESOLVED | Noted: 2023-06-05 | Resolved: 2024-03-05

## 2024-03-05 PROBLEM — D50.9 IRON DEFICIENCY ANEMIA, UNSPECIFIED: Status: ACTIVE | Noted: 2024-03-05

## 2024-03-05 PROCEDURE — 99214 OFFICE O/P EST MOD 30 MIN: CPT

## 2024-03-05 RX ORDER — PROPRANOLOL HYDROCHLORIDE 80 MG/1
80 CAPSULE, EXTENDED RELEASE ORAL DAILY
Qty: 90 CAPSULE | Refills: 1 | Status: SHIPPED | OUTPATIENT
Start: 2024-03-05

## 2024-03-05 RX ORDER — METOCLOPRAMIDE 5 MG/1
5 TABLET ORAL 4 TIMES DAILY PRN
Qty: 30 TABLET | Refills: 0 | Status: SHIPPED | OUTPATIENT
Start: 2024-03-05

## 2024-03-05 RX ORDER — NAPROXEN SODIUM 220 MG
220 TABLET ORAL EVERY 12 HOURS PRN
Qty: 30 TABLET | Refills: 0 | Status: SHIPPED | OUTPATIENT
Start: 2024-03-05

## 2024-03-05 RX ORDER — SUMATRIPTAN 25 MG/1
25 TABLET, FILM COATED ORAL ONCE AS NEEDED
Qty: 30 TABLET | Refills: 0 | Status: SHIPPED | OUTPATIENT
Start: 2024-03-05

## 2024-03-05 NOTE — PROGRESS NOTES
Name: Gus Jurado      : 2000      MRN: [id number]  Encounter Provider: SILVIA Duran  Encounter Date: 3/5/2024   Encounter department: [facility]    Assessment & Plan     1. Migraine with aura and without status migrainosus, not intractable  Assessment & Plan:  Recommend seeing ophthalmology.    Medications as prescribed. D/c Excedrin. Reviewed rebound headaches associated with using > 10 times per month.  F/u 3 months.       Orders:  -     SUMAtriptan (Imitrex) 25 mg tablet; Take 1 tablet (25 mg total) by mouth once as needed for migraine for up to 90 doses  -     propranolol (INDERAL LA) 80 mg 24 hr capsule; Take 1 capsule (80 mg total) by mouth daily  -     metoclopramide (REGLAN) 5 mg tablet; Take 1 tablet (5 mg total) by mouth 4 (four) times a day as needed (nausea)  -     naproxen sodium (ALEVE) 220 MG tablet; Take 1 tablet (220 mg total) by mouth every 12 (twelve) hours as needed for headaches    2. Other iron deficiency anemia  Assessment & Plan:  Lab Results   Component Value Date    WBC 10.91 (H) 2023    HGB 12.1 2023    HCT 39.9 2023    MCV 79 (L) 2023     (H) 2023     Lab Results   Component Value Date    IRON 30 (L) 10/06/2023    TIBC 347 10/06/2023    FERRITIN 41 10/06/2023     Reports that she is taking iron daily. She does not really get her menstrual cycle because she has an IUD in place. She was taking iron during pregnancy and then it was recommended again in October after an iron panel was done in 2023. Last CBC done in 2023. Will check labs.     Orders:  -     CBC and differential; Future  -     Iron Panel (Includes Ferritin, Iron Sat%, Iron, and TIBC); Future        Depression Screening and Follow-up Plan: Patient was screened for depression during today's encounter. They screened negative with a PHQ-2 score of 0.      Subjective      Gus Jurado is a 23 y.o.  female  has a past medical history of Chlamydia.  has a past surgical history that includes pr  delivery only (N/A, 3/29/2021) and  section.    She presents today endorsing headaches. She reports that she has always suffered from headaches. She had a head MRI done in the past and this was unremarkable.   She reports that pain feels like pressure mostly on left side of head. Associated symptoms: photophobia, phonophobia, N/V. She took sumagram from [employer] in the past which helped. She is also taking excederin about  three times a week for about a month. Pain is about the same. Does not see ophthalmalogist. Has trouble driving at night sometimes d/t blurry vision. Drinks > 64 oz of water per day. Exercises about 3-4 times a week, does pilates. Sleeps about 8 hours per night.     Review of Systems   Constitutional:  Negative for chills and fever.   HENT:  Negative for ear pain and sore throat.    Eyes:  Negative for pain and visual disturbance.   Respiratory:  Negative for cough and shortness of breath.    Cardiovascular:  Negative for chest pain and palpitations.   Gastrointestinal:  Positive for nausea. Negative for abdominal pain and vomiting.   Genitourinary:  Negative for dysuria and hematuria.   Musculoskeletal:  Negative for arthralgias and back pain.   Skin:  Negative for color change and rash.   Neurological:  Positive for headaches. Negative for seizures and syncope.   All other systems reviewed and are negative.      Current Outpatient Medications on File Prior to Visit   Medication Sig    Levonorgestrel (MIRENA) 20 MCG/DAY IUD 1 each by Intrauterine route Once every 8 years In since 2022    ferrous sulfate 324 (65 Fe) mg Take 1 tablet (324 mg total) by mouth 2 (two) times a day before meals (Patient not taking: Reported on 10/6/2023)    [DISCONTINUED] docusate sodium (COLACE) 100 mg capsule Take 1 capsule (100 mg total) by mouth 2 (two) times a day (Patient not taking: Reported on  "10/6/2023)    [DISCONTINUED] Prenatal Vit-Fe Fumarate-FA (Prenatal/Folic Acid) TABS Take 1 tablet by mouth daily (Patient not taking: Reported on 10/6/2023)       Objective     /76 (BP Location: Left arm, Patient Position: Sitting, Cuff Size: Standard)   Pulse 87   Temp (!) 97 °F (36.1 °C) (Temporal)   Resp 18   Ht 5' 5\" (1.651 m)   Wt 106 kg (234 lb 3 oz)   LMP 02/17/2024 Comment: on Mirena  SpO2 99%   BMI 38.97 kg/m²     Physical Exam  Vitals and nursing note reviewed.   HENT:      Head: Normocephalic and atraumatic.      Right Ear: External ear normal.      Left Ear: External ear normal.      Nose: Nose normal.   Eyes:      Extraocular Movements: Extraocular movements intact.      Conjunctiva/sclera: Conjunctivae normal.      Pupils: Pupils are equal, round, and reactive to light.   Cardiovascular:      Rate and Rhythm: Normal rate and regular rhythm.      Pulses: Normal pulses.      Heart sounds: Normal heart sounds.   Pulmonary:      Effort: Pulmonary effort is normal.      Breath sounds: Normal breath sounds.   Abdominal:      General: Bowel sounds are normal.      Palpations: Abdomen is soft.      Tenderness: There is no abdominal tenderness.   Musculoskeletal:         General: Normal range of motion.      Cervical back: Normal range of motion.   Skin:     General: Skin is warm and dry.   Neurological:      General: No focal deficit present.      Mental Status: She is alert and oriented to person, place, and time. Mental status is at baseline.      Cranial Nerves: No cranial nerve deficit.      Sensory: Sensation is intact.      Motor: Motor function is intact.      Coordination: Coordination is intact.      Gait: Gait is intact.   Psychiatric:         Mood and Affect: Mood normal.         Behavior: Behavior normal.         Thought Content: Thought content normal.         Judgment: Judgment normal.     SILVIA Duran    "

## 2024-03-05 NOTE — ASSESSMENT & PLAN NOTE
Recommend seeing ophthalmology.    Medications as prescribed. D/c Excedrin. Reviewed rebound headaches associated with using > 10 times per month.  F/u 3 months.

## 2024-03-05 NOTE — ASSESSMENT & PLAN NOTE
Lab Results   Component Value Date    WBC 10.91 (H) 06/09/2023    HGB 12.1 06/09/2023    HCT 39.9 06/09/2023    MCV 79 (L) 06/09/2023     (H) 06/09/2023     Lab Results   Component Value Date    IRON 30 (L) 10/06/2023    TIBC 347 10/06/2023    FERRITIN 41 10/06/2023     Reports that she is taking iron daily. She does not really get her menstrual cycle because she has an IUD in place. She was taking iron during pregnancy and then it was recommended again in October after an iron panel was done in October of 2023. Last CBC done in June of 2023. Will check labs.

## 2024-03-18 ENCOUNTER — TELEPHONE (OUTPATIENT)
Dept: FAMILY MEDICINE CLINIC | Facility: CLINIC | Age: 24
End: 2024-03-18

## 2024-03-18 DIAGNOSIS — G43.109 MIGRAINE WITH AURA AND WITHOUT STATUS MIGRAINOSUS, NOT INTRACTABLE: ICD-10-CM

## 2024-03-18 RX ORDER — NAPROXEN SODIUM 220 MG
220 TABLET ORAL EVERY 12 HOURS PRN
Qty: 30 TABLET | Refills: 0 | Status: SHIPPED | OUTPATIENT
Start: 2024-03-18

## 2024-03-18 NOTE — TELEPHONE ENCOUNTER
Pt call to let you know that SUMAtriptan (Imitrex) 25 mg tablet is not helping migraine. Pt states she has a migraine since Sunday and pills are not helping her pain. Asking for something else.

## 2024-03-27 DIAGNOSIS — G43.109 MIGRAINE WITH AURA AND WITHOUT STATUS MIGRAINOSUS, NOT INTRACTABLE: ICD-10-CM

## 2024-03-27 RX ORDER — SUMATRIPTAN 25 MG/1
25 TABLET, FILM COATED ORAL ONCE AS NEEDED
Qty: 18 TABLET | Refills: 0 | Status: SHIPPED | OUTPATIENT
Start: 2024-03-27

## 2024-03-29 ENCOUNTER — APPOINTMENT (OUTPATIENT)
Dept: LAB | Facility: CLINIC | Age: 24
End: 2024-03-29
Payer: MEDICARE

## 2024-03-29 DIAGNOSIS — D50.8 OTHER IRON DEFICIENCY ANEMIA: ICD-10-CM

## 2024-03-29 LAB
BASOPHILS # BLD AUTO: 0.07 THOUSANDS/ÂΜL (ref 0–0.1)
BASOPHILS NFR BLD AUTO: 1 % (ref 0–1)
EOSINOPHIL # BLD AUTO: 0.12 THOUSAND/ÂΜL (ref 0–0.61)
EOSINOPHIL NFR BLD AUTO: 1 % (ref 0–6)
ERYTHROCYTE [DISTWIDTH] IN BLOOD BY AUTOMATED COUNT: 14.9 % (ref 11.6–15.1)
FERRITIN SERPL-MCNC: 42 NG/ML (ref 11–307)
HCT VFR BLD AUTO: 40.8 % (ref 34.8–46.1)
HGB BLD-MCNC: 12.1 G/DL (ref 11.5–15.4)
IMM GRANULOCYTES # BLD AUTO: 0.06 THOUSAND/UL (ref 0–0.2)
IMM GRANULOCYTES NFR BLD AUTO: 1 % (ref 0–2)
IRON SATN MFR SERPL: 11 % (ref 15–50)
IRON SERPL-MCNC: 39 UG/DL (ref 50–212)
LYMPHOCYTES # BLD AUTO: 2.7 THOUSANDS/ÂΜL (ref 0.6–4.47)
LYMPHOCYTES NFR BLD AUTO: 26 % (ref 14–44)
MCH RBC QN AUTO: 23.8 PG (ref 26.8–34.3)
MCHC RBC AUTO-ENTMCNC: 29.7 G/DL (ref 31.4–37.4)
MCV RBC AUTO: 80 FL (ref 82–98)
MONOCYTES # BLD AUTO: 0.71 THOUSAND/ÂΜL (ref 0.17–1.22)
MONOCYTES NFR BLD AUTO: 7 % (ref 4–12)
NEUTROPHILS # BLD AUTO: 6.7 THOUSANDS/ÂΜL (ref 1.85–7.62)
NEUTS SEG NFR BLD AUTO: 64 % (ref 43–75)
NRBC BLD AUTO-RTO: 0 /100 WBCS
PLATELET # BLD AUTO: 433 THOUSANDS/UL (ref 149–390)
PMV BLD AUTO: 9.3 FL (ref 8.9–12.7)
RBC # BLD AUTO: 5.09 MILLION/UL (ref 3.81–5.12)
TIBC SERPL-MCNC: 361 UG/DL (ref 250–450)
UIBC SERPL-MCNC: 322 UG/DL (ref 155–355)
WBC # BLD AUTO: 10.36 THOUSAND/UL (ref 4.31–10.16)

## 2024-03-29 PROCEDURE — 85025 COMPLETE CBC W/AUTO DIFF WBC: CPT

## 2024-03-29 PROCEDURE — 36415 COLL VENOUS BLD VENIPUNCTURE: CPT

## 2024-03-29 PROCEDURE — 82728 ASSAY OF FERRITIN: CPT

## 2024-03-29 PROCEDURE — 83550 IRON BINDING TEST: CPT

## 2024-03-29 PROCEDURE — 83540 ASSAY OF IRON: CPT

## 2024-05-17 ENCOUNTER — OFFICE VISIT (OUTPATIENT)
Dept: BARIATRICS | Facility: CLINIC | Age: 24
End: 2024-05-17
Payer: MEDICARE

## 2024-05-17 VITALS
SYSTOLIC BLOOD PRESSURE: 116 MMHG | HEIGHT: 64 IN | RESPIRATION RATE: 16 BRPM | WEIGHT: 280.4 LBS | DIASTOLIC BLOOD PRESSURE: 70 MMHG | BODY MASS INDEX: 47.87 KG/M2 | HEART RATE: 70 BPM | TEMPERATURE: 97.1 F

## 2024-05-17 DIAGNOSIS — E66.01 OBESITY, CLASS III, BMI 40-49.9 (MORBID OBESITY) (HCC): Primary | ICD-10-CM

## 2024-05-17 PROCEDURE — 99244 OFF/OP CNSLTJ NEW/EST MOD 40: CPT | Performed by: NURSE PRACTITIONER

## 2024-05-17 NOTE — PROGRESS NOTES
Assessment/Plan:    Obesity, Class III, BMI 40-49.9 (morbid obesity) (HCC)  - Discussed options of HealthyCORE-Intensive Lifestyle Intervention Program, Very Low Calorie Diet-VLCD, Conservative Program, Adam-En-Y Gastric Bypass, and Vertical Sleeve Gastrectomy and the role of weight loss medications.  - Not a candidate for VLCD due to iron def anemia.   - Not interested in weight loss surgery.   - Explained the importance of making lifestyle changes with anti-obesity medications.  - Patient is interested in pursuing Conservative Program  - Initial weight loss goal of 5-10% weight loss for improved health  - Weight loss can improve patient's co-morbid conditions and/or prevent weight-related complications.  - Stop Bang 2/8  - She is interested in weight loss medications, but she is breastfeeding her 3 year old child at night. Reviewed with her that weight loss medications cannot be prescribed while breastfeeding. In the future when she is no longer breastfeeding, she would then be a candidate for medication. Message sent to prior care provider to update them that she is still breastfeeding.   - Labs reviewed: TSH, lipid, A1C, and CMP 6/9/2023. Glucose somewhat low at 54, but A1C in prediabetes range at 5.7. HDL low, which will likely improve with weight loss. Remainder of the blood work within acceptable range.        Goals:  Do not skip meals.  Food log (ie.) www.Jigsaw.com,sparkpeople.com,loseit.com,calorieking.com,etc. baritastic (use skinnytaste.com, MakeSpace or smartphone angelito [employer] for recipes)  No sugary beverages. At least 64oz of water daily.  Increase physical activity by 10 minutes daily. Gradually increase physical activity to a goal of 5 days per week for 30 minutes of MODERATE intensity PLUS 2 days per week of FULL BODY resistance training (use smartphone apps MYOMO, Home Workout, etc.)  Start food logging.   1800 calories per day.   Start measuring water. Get at least 64 oz daily.    Keep up the great work with exercise.          Gus was seen today for consult.    Diagnoses and all orders for this visit:    Obesity, Class III, BMI 40-49.9 (morbid obesity) (HCC)        Total time spent: 45 min, with >50% face-to-face time spent counseling patient on nonsurgical interventions for the treatment of excess weight. Discussed in detail nonsurgical options including intensive lifestyle intervention program, very low-calorie diet program and conservative program.  Discussed the role of weight loss medications.  Counseled patient on diet behavior and exercise modification for weight loss.          Follow up in approximately 3 months with Non-Surgical Physician/Advanced Practitioner.    Subjective:   Chief Complaint   Patient presents with    Consult     MWM- Consult, GW 190lb- Stop Bang        Patient ID: Gus Jurado  is a 23 y.o. female with excess weight/obesity here to pursue weight management.  Previous notes and records have been reviewed. [employer]  #[id number] translated the office visit.     Past Medical History:   Diagnosis Date    Chlamydia 2018     Past Surgical History:   Procedure Laterality Date     SECTION      3    SD  DELIVERY ONLY N/A 3/29/2021    Procedure:  SECTION () REPEAT;  Surgeon: Patel Moreira MD;  [facility];  Service: Obstetrics       HPI:  Wt Readings from Last 20 Encounters:   24 127 kg (280 lb 6.4 oz)   24 106 kg (234 lb 3 oz)   10/06/23 118 kg (260 lb)   23 122 kg (270 lb)   22 118 kg (261 lb 3.9 oz)   22 112 kg (248 lb)   07/15/22 110 kg (242 lb 12.8 oz)   22 108 kg (237 lb 12.8 oz)   22 110 kg (242 lb)   21 114 kg (251 lb)   21 110 kg (243 lb)   21 108 kg (238 lb)   21 110 kg (242 lb 12.8 oz)   21 115 kg (253 lb)   21 116 kg (255 lb)   21 114 kg (251 lb 12.8 oz)   21 114 kg (252 lb 3.2 oz)   03/15/21 115 kg  (253 lb)   03/15/21 115 kg (253 lb)   21 114 kg (250 lb 9.6 oz)     Obesity/Excess Weight:  Severity: Severe  Onset:  since last pregnancy 3 years ago    Modifiers: Diet and Exercise  Contributing factors: Pregnancy  Associated symptoms: comorbid conditions    Currently breast feeding 3 year old child at night. .     Sometimes feels hungry. Tends to eat out of boredom or when stressed. No cravings.     Hydration: drinks a lot of water but unsure how much, occ juice, rare soda  Alcohol: none  Smoking: denies  Exercise: abdominal and chest exercises 5 days per week   Occupation: stay at home mom  Sleep: 6-8 hours  STOP ban/8    Highest weight: current  Current weight: 280.4 lbs BMI 48.89  Goal weight: 190-195 lbs    Colonoscopy: N/A  Mammogram: N/A    The following portions of the patient's history were reviewed and updated as appropriate: allergies, current medications, past family history, past medical history, past social history, past surgical history, and problem list.    Family History   Problem Relation Age of Onset    No Known Problems Mother     No Known Problems Father     No Known Problems Sister     No Known Problems Brother     No Known Problems Daughter     No Known Problems Maternal Grandmother     No Known Problems Maternal Grandfather     Ovarian cancer Paternal Grandmother     Cancer Neg Hx     Diabetes Neg Hx         Review of Systems   HENT:  Negative for sore throat.    Respiratory:  Negative for cough and shortness of breath.    Cardiovascular:  Negative for chest pain and palpitations.   Gastrointestinal:  Negative for abdominal pain, constipation, diarrhea, nausea and vomiting.        Denies GERD   Endocrine: Negative for cold intolerance and heat intolerance.   Genitourinary:  Negative for dysuria.   Musculoskeletal:  Positive for back pain (intermittent). Negative for arthralgias.   Skin:  Negative for rash.   Neurological:  Positive for headaches (has migraines).  "  Psychiatric/Behavioral:  Negative for suicidal ideas (or HI).         Denies depression   + anxiety situational       Objective:  /70   Pulse 70   Temp (!) 97.1 °F (36.2 °C)   Resp 16   Ht 5' 3.5\" (1.613 m)   Wt 127 kg (280 lb 6.4 oz)   BMI 48.89 kg/m²     Physical Exam  Vitals and nursing note reviewed.        Constitutional   General appearance: Abnormal.  well developed and morbidly obese.   Eyes No conjunctival injection.   Ears, Nose, Mouth, and Throat Oral mucosa moist.   Pulmonary   Respiratory effort: No increased work of breathing or signs of respiratory distress.    Cardiovascular    Examination of extremities for edema and/or varicosities: Normal.  no edema.   Abdomen   Abdomen: Abnormal.  The abdomen was obese.    Musculoskeletal   Normal range of motion  Neurological   Gait and station: Normal.   Psychiatric   Orientation to person, place and time: Normal.    Affect: appropriate     "

## 2024-05-19 PROBLEM — E66.813 OBESITY, CLASS III, BMI 40-49.9 (MORBID OBESITY): Status: ACTIVE | Noted: 2024-05-19

## 2024-05-19 PROBLEM — E66.01 OBESITY, CLASS III, BMI 40-49.9 (MORBID OBESITY) (HCC): Status: ACTIVE | Noted: 2024-05-19

## 2024-05-19 NOTE — ASSESSMENT & PLAN NOTE
- Discussed options of HealthyCORE-Intensive Lifestyle Intervention Program, Very Low Calorie Diet-VLCD, Conservative Program, Adam-En-Y Gastric Bypass, and Vertical Sleeve Gastrectomy and the role of weight loss medications.  - Not a candidate for VLCD due to iron def anemia.   - Not interested in weight loss surgery.   - Explained the importance of making lifestyle changes with anti-obesity medications.  - Patient is interested in pursuing Conservative Program  - Initial weight loss goal of 5-10% weight loss for improved health  - Weight loss can improve patient's co-morbid conditions and/or prevent weight-related complications.  - Stop Bang 2/8  - She is interested in weight loss medications, but she is breastfeeding her 3 year old child at night. Reviewed with her that weight loss medications cannot be prescribed while breastfeeding. In the future when she is no longer breastfeeding, she would then be a candidate for medication. Message sent to prior care provider to update them that she is still breastfeeding.   - Labs reviewed: TSH, lipid, A1C, and CMP 6/9/2023. Glucose somewhat low at 54, but A1C in prediabetes range at 5.7. HDL low, which will likely improve with weight loss. Remainder of the blood work within acceptable range.        Goals:  Do not skip meals.  Food log (ie.) www.[employer].com,sparkpeople.com,loseit.com,calorieking.com,etc. baritastic (use skinnytaste.com, Piehole or smartphone angelito [employer] for recipes)  No sugary beverages. At least 64oz of water daily.  Increase physical activity by 10 minutes daily. Gradually increase physical activity to a goal of 5 days per week for 30 minutes of MODERATE intensity PLUS 2 days per week of FULL BODY resistance training (use smartphone apps [employer], Home Workout, etc.)  Start food logging.   1800 calories per day.   Start measuring water. Get at least 64 oz daily.   Keep up the great work with exercise.

## 2024-06-25 DIAGNOSIS — G43.109 MIGRAINE WITH AURA AND WITHOUT STATUS MIGRAINOSUS, NOT INTRACTABLE: ICD-10-CM

## 2024-06-26 RX ORDER — NAPROXEN SODIUM 220 MG
220 TABLET ORAL EVERY 12 HOURS PRN
Qty: 30 TABLET | Refills: 0 | Status: SHIPPED | OUTPATIENT
Start: 2024-06-26

## 2024-06-26 RX ORDER — METOCLOPRAMIDE 5 MG/1
5 TABLET ORAL 4 TIMES DAILY PRN
Qty: 30 TABLET | Refills: 0 | Status: SHIPPED | OUTPATIENT
Start: 2024-06-26

## 2024-07-15 ENCOUNTER — OFFICE VISIT (OUTPATIENT)
Dept: FAMILY MEDICINE CLINIC | Facility: CLINIC | Age: 24
End: 2024-07-15

## 2024-07-15 VITALS
OXYGEN SATURATION: 99 % | TEMPERATURE: 97.8 F | SYSTOLIC BLOOD PRESSURE: 106 MMHG | WEIGHT: 269 LBS | DIASTOLIC BLOOD PRESSURE: 71 MMHG | HEART RATE: 68 BPM | RESPIRATION RATE: 18 BRPM | HEIGHT: 64 IN | BODY MASS INDEX: 45.93 KG/M2

## 2024-07-15 DIAGNOSIS — G43.109 MIGRAINE WITH AURA AND WITHOUT STATUS MIGRAINOSUS, NOT INTRACTABLE: Primary | ICD-10-CM

## 2024-07-15 PROCEDURE — 99214 OFFICE O/P EST MOD 30 MIN: CPT

## 2024-07-15 NOTE — PROGRESS NOTES
"Ambulatory Visit  Name: Gus Jurado      : 2000      MRN: [id number]  Encounter Provider: SILVIA Duran  Encounter Date: 7/15/2024   Encounter department: [facility]    Assessment & Plan   1. Migraine with aura and without status migrainosus, not intractable  Assessment & Plan:  Continue prorpanolol 80 mg daily, reglan pen, naproxen & sumatriptan prn.       History of Present Illness           She presents today for a migraine follow-up. Las OV, she was started on propranolol 80 mg daily, reglan prn for nausea, sumatriptan & naproxen as needed for headaches. Previously migraines were occurring up to three times a week. Since she was been on medication, migraines are now occurring every two weeks. Reglan, sumatriptan, and naproxen are all effective as abortive therapy.  She is content with progress.        Review of Systems   Constitutional:  Negative for chills and fever.   HENT:  Negative for ear pain and sore throat.    Eyes:  Negative for pain and visual disturbance.   Respiratory:  Negative for cough and shortness of breath.    Cardiovascular:  Negative for chest pain and palpitations.   Gastrointestinal:  Negative for abdominal pain and vomiting.   Genitourinary:  Negative for dysuria and hematuria.   Musculoskeletal:  Negative for arthralgias and back pain.   Skin:  Negative for color change and rash.   Neurological:  Positive for headaches. Negative for seizures and syncope.   All other systems reviewed and are negative.      Objective     /71 (BP Location: Left arm, Patient Position: Sitting, Cuff Size: Large)   Pulse 68   Temp 97.8 °F (36.6 °C) (Temporal)   Resp 18   Ht 5' 3.5\" (1.613 m)   Wt 122 kg (269 lb)   LMP  (LMP Unknown)   SpO2 99%   BMI 46.90 kg/m²     Physical Exam  Vitals and nursing note reviewed.   Constitutional:       General: She is not in acute distress.     Appearance: Normal appearance. She is " well-developed.   HENT:      Head: Normocephalic and atraumatic.      Right Ear: External ear normal.      Left Ear: External ear normal.      Nose: Nose normal.   Eyes:      Conjunctiva/sclera: Conjunctivae normal.   Cardiovascular:      Rate and Rhythm: Normal rate and regular rhythm.      Pulses: Normal pulses.      Heart sounds: Normal heart sounds. No murmur heard.  Pulmonary:      Effort: Pulmonary effort is normal. No respiratory distress.      Breath sounds: Normal breath sounds.   Abdominal:      Palpations: Abdomen is soft.      Tenderness: There is no abdominal tenderness.   Musculoskeletal:         General: No swelling. Normal range of motion.      Cervical back: Normal range of motion and neck supple.   Skin:     General: Skin is warm and dry.      Capillary Refill: Capillary refill takes less than 2 seconds.   Neurological:      General: No focal deficit present.      Mental Status: She is alert and oriented to person, place, and time. Mental status is at baseline.   Psychiatric:         Mood and Affect: Mood normal.         Behavior: Behavior normal.         Thought Content: Thought content normal.         Judgment: Judgment normal.       Administrative Statements

## 2024-08-21 ENCOUNTER — OFFICE VISIT (OUTPATIENT)
Age: 24
End: 2024-08-21

## 2024-08-21 VITALS
WEIGHT: 270 LBS | TEMPERATURE: 97.6 F | SYSTOLIC BLOOD PRESSURE: 108 MMHG | HEART RATE: 88 BPM | BODY MASS INDEX: 46.1 KG/M2 | HEIGHT: 64 IN | DIASTOLIC BLOOD PRESSURE: 70 MMHG

## 2024-08-21 DIAGNOSIS — E66.01 OBESITY, CLASS III, BMI 40-49.9 (MORBID OBESITY) (HCC): Primary | ICD-10-CM

## 2024-08-21 PROCEDURE — RECHECK: Performed by: PHYSICIAN ASSISTANT

## 2024-08-21 NOTE — PROGRESS NOTES
Assessment/Plan:    1. Obesity, Class III, BMI 40-49.9 (morbid obesity) ([facility])  ECG 12 lead        - Weight not at goal  - Patient is interested in Conservative Program  - Labs reviewed: As below.    General Recommendations:  Nutrition:  Eat breakfast daily.  Do not skip meals.     Food log (ie.) www.[employer].com, sparkpeople.com, loseit.com, Mogl[employer].com, etc.    Practice mindful eating.  Be sure to set aside time to eat, eat slowly, and savor your food.    Hydration:    At least 64oz of water daily.  No sugar sweetened beverages.  No juice (eat the fruit instead).    Exercise:  Studies have shown that the ideal exercise goal is somewhere between 150 to 300 minutes of moderate intensity exercise a week.  Start with exercising 10 minutes every other day and gradually increase physical activity with a goal of at least 150 minutes of moderate intensity exercise a week, divided over at least 3 days a week.  An example of this would be exercising 30 minutes a day, 5 days a week.  Resistance training can increase muscle mass and increase our resting metabolic rate.   FULL BODY resistance training is recommended 2-3 times a week.  Do not do this on consecutive days to allow for muscle recovery.    Aim for a bare minimum 5000 steps, even on days you do not exercise.    Monitoring:   Weigh yourself daily.  If this causes undue stress, then just weigh yourself once a week.  Weigh yourself the same time of the day with the same amount of clothing on.  Preferably this should be done after waking up, before you eat, and with no clothing or minimal clothing on.    Specific Goals:  No sugary beverages. At least 64oz of water daily.  Gradually increase physical activity to a goal of 5 days per week for 30 minutes of MODERATE intensity PLUS 2 days per week of FULL BODY resistance training  Goal protein intake of 60-80 grams per day    Calorie goal:  6302-0873 nba/day    Return visit:    Calorie tracking/deficit  Physical  activity goals  AOM  Trial of phentermine. Will update EKG  RTC 3-4 months    Subjective:   Chief Complaint   Patient presents with    Follow-up     MWM 3MTH F/U       Patient ID: Gus Jurado  is a 24 y.o. female with excess weight/obesity here to pursue weight management.  Previous notes and records have been reviewed. [employer]  #[id number] translated the office visit.     Past Medical History:   Diagnosis Date    Chlamydia 2018     Past Surgical History:   Procedure Laterality Date     SECTION      3    [place]  DELIVERY ONLY N/A 3/29/2021    Procedure:  SECTION () REPEAT;  Surgeon: Patel Moreira MD;  Location: [facility];  Service: Obstetrics       HPI:  Wt Readings from Last 20 Encounters:   24 125 kg (275 lb 12.8 oz)   10/30/24 124 kg (272 lb 9.6 oz)   24 122 kg (270 lb)   07/15/24 122 kg (269 lb)   24 127 kg (280 lb 6.4 oz)   24 106 kg (234 lb 3 oz)   10/06/23 118 kg (260 lb)   23 122 kg (270 lb)   22 118 kg (261 lb 3.9 oz)   22 112 kg (248 lb)   07/15/22 110 kg (242 lb 12.8 oz)   22 108 kg (237 lb 12.8 oz)   22 110 kg (242 lb)   21 114 kg (251 lb)   21 110 kg (243 lb)   21 108 kg (238 lb)   21 110 kg (242 lb 12.8 oz)   21 115 kg (253 lb)   21 116 kg (255 lb)   21 114 kg (251 lb 12.8 oz)     Obesity/Excess Weight:  Severity: Severe  Onset:  since last pregnancy 3 years ago    Modifiers: Diet and Exercise  Contributing factors: Pregnancy  Associated symptoms: comorbid conditions      Sometimes feels hungry. Tends to eat out of boredom or when stressed. No cravings.     Hydration: drinks a lot of water but unsure how much, occ juice, rare soda  Alcohol: none  Smoking: denies  Exercise: abdominal and chest exercises 5 days per week   Occupation: stay at home mom  Sleep: 6-8 hours  STOP ban/8    Highest weight: current  Current weight: 280.4 lbs BMI 48.89  Goal weight:  "190-195 lbs    Colonoscopy: N/A  Mammogram: N/A    The following portions of the patient's history were reviewed and updated as appropriate: allergies, current medications, past family history, past medical history, past social history, past surgical history, and problem list.    Family History   Problem Relation Age of Onset    No Known Problems Mother     No Known Problems Father     No Known Problems Sister     No Known Problems Brother     No Known Problems Daughter     No Known Problems Maternal Grandmother     No Known Problems Maternal Grandfather     Ovarian cancer Paternal Grandmother     Cancer Neg Hx     Diabetes Neg Hx         Review of Systems   HENT:  Negative for sore throat.    Respiratory:  Negative for cough and shortness of breath.    Cardiovascular:  Negative for chest pain and palpitations.   Gastrointestinal:  Negative for abdominal pain, constipation, diarrhea, nausea and vomiting.        Denies GERD   Endocrine: Negative for cold intolerance and heat intolerance.   Genitourinary:  Negative for dysuria.   Musculoskeletal:  Positive for back pain (intermittent). Negative for arthralgias.   Skin:  Negative for rash.   Neurological:  Positive for headaches (has migraines).   Psychiatric/Behavioral:  Negative for suicidal ideas (or HI).         Denies depression   + anxiety situational       Objective:  /70   Pulse 88   Temp 97.6 °F (36.4 °C) (Tympanic)   Ht 5' 3.5\" (1.613 m)   Wt 122 kg (270 lb)   BMI 47.08 kg/m²     Physical Exam  Vitals and nursing note reviewed.        Constitutional   General appearance: Abnormal.  well developed and morbidly obese.   Eyes No conjunctival injection.   Ears, Nose, Mouth, and Throat Oral mucosa moist.   Pulmonary   Respiratory effort: No increased work of breathing or signs of respiratory distress.    Cardiovascular    Examination of extremities for edema and/or varicosities: Normal.  no edema.   Abdomen   Abdomen: Abnormal.  The abdomen was obese.  "   Musculoskeletal   Normal range of motion  Neurological   Gait and station: Normal.   Psychiatric   Orientation to person, place and time: Normal.    Affect: appropriate

## 2024-08-26 ENCOUNTER — OFFICE VISIT (OUTPATIENT)
Dept: LAB | Facility: HOSPITAL | Age: 24
End: 2024-08-26
Payer: MEDICARE

## 2024-08-26 DIAGNOSIS — E66.01 OBESITY, CLASS III, BMI 40-49.9 (MORBID OBESITY) (HCC): ICD-10-CM

## 2024-08-26 PROCEDURE — 93005 ELECTROCARDIOGRAM TRACING: CPT

## 2024-08-28 LAB
ATRIAL RATE: 86 BPM
P AXIS: 62 DEGREES
PR INTERVAL: 178 MS
QRS AXIS: 20 DEGREES
QRSD INTERVAL: 80 MS
QT INTERVAL: 378 MS
QTC INTERVAL: 452 MS
T WAVE AXIS: -4 DEGREES
VENTRICULAR RATE: 86 BPM

## 2024-08-28 PROCEDURE — 93010 ELECTROCARDIOGRAM REPORT: CPT | Performed by: INTERNAL MEDICINE

## 2024-09-06 DIAGNOSIS — G43.109 MIGRAINE WITH AURA AND WITHOUT STATUS MIGRAINOSUS, NOT INTRACTABLE: ICD-10-CM

## 2024-09-09 RX ORDER — SUMATRIPTAN 25 MG/1
25 TABLET, FILM COATED ORAL ONCE AS NEEDED
Qty: 18 TABLET | Refills: 0 | Status: SHIPPED | OUTPATIENT
Start: 2024-09-09

## 2024-10-06 DIAGNOSIS — G43.109 MIGRAINE WITH AURA AND WITHOUT STATUS MIGRAINOSUS, NOT INTRACTABLE: ICD-10-CM

## 2024-10-07 RX ORDER — PROPRANOLOL HYDROCHLORIDE 80 MG/1
80 CAPSULE, EXTENDED RELEASE ORAL DAILY
Qty: 30 CAPSULE | Refills: 5 | Status: SHIPPED | OUTPATIENT
Start: 2024-10-07

## 2024-10-08 DIAGNOSIS — G43.109 MIGRAINE WITH AURA AND WITHOUT STATUS MIGRAINOSUS, NOT INTRACTABLE: ICD-10-CM

## 2024-10-08 RX ORDER — SUMATRIPTAN 25 MG/1
25 TABLET, FILM COATED ORAL ONCE AS NEEDED
Qty: 18 TABLET | Refills: 0 | Status: SHIPPED | OUTPATIENT
Start: 2024-10-08

## 2024-10-30 ENCOUNTER — ANNUAL EXAM (OUTPATIENT)
Dept: OBGYN CLINIC | Facility: CLINIC | Age: 24
End: 2024-10-30

## 2024-10-30 VITALS
BODY MASS INDEX: 47.53 KG/M2 | SYSTOLIC BLOOD PRESSURE: 108 MMHG | DIASTOLIC BLOOD PRESSURE: 73 MMHG | HEART RATE: 76 BPM | WEIGHT: 272.6 LBS

## 2024-10-30 DIAGNOSIS — Z01.419 ENCOUNTER FOR ANNUAL ROUTINE GYNECOLOGICAL EXAMINATION: Primary | ICD-10-CM

## 2024-10-30 PROCEDURE — 99395 PREV VISIT EST AGE 18-39: CPT | Performed by: NURSE PRACTITIONER

## 2024-10-30 NOTE — PROGRESS NOTES
Annual Exam    Assessment   1. Encounter for annual routine gynecological examination          well woman       Plan       All questions answered.  Breast self exam technique reviewed and patient encouraged to perform self-exam monthly.  Contraception: IUD.  Discussed healthy lifestyle modifications.  Follow up in 1 year.     Patient Instructions   HOPE Line [phone]  Call with needs or concerns   Return in 11 year  Pt verbalized understanding of all discussed.      Alannah Jurado is a 24 y.o.  female who presents for annual well woman exam. Periods are rare with Mirena IUD, lasting a few days. No vaginal discharge.  2023 WNL PAP and negative HPV  1 partner x 5 years, denies domestic violence  Never had HPV vaccines, discussed the benefit, declined vaccines    Depression Screening Follow-up Plan: Patient's depression screening was negative with a PHQ-2 score of 0. Their PHQ-9 score was 0. Patient with underlying depression and was advised to continue current medications as prescribed.    BMI Counseling: Body mass index is 47.53 kg/m². The BMI is above normal. Nutrition recommendations include reducing portion sizes, decreasing overall calorie intake, 3-5 servings of fruits/vegetables daily, consuming healthier snacks, moderation in carbohydrate intake, increasing intake of lean protein, and reducing intake of saturated fat and trans fat. Exercise recommendations include moderate aerobic physical activity for 150 minutes/week.        Current contraception: IUD  History of abnormal Pap smear: no  Family history of uterine or ovarian cancer: yes - Grandmother, HOPE Line provided  Regular self breast exam: yes  History of abnormal mammogram: N/A  Family history of breast cancer: no  History of abnormal lipids: unknown  Menstrual History:  OB History          3    Para   3    Term   2       1    AB        Living   2         SAB        IAB        Ectopic        Multiple    0    Live Births   3                Menarche age: 11  No LMP recorded (lmp unknown). Patient has had an implant.       The following portions of the patient's history were reviewed and updated as appropriate: allergies, current medications, past family history, past medical history, past social history, past surgical history and problem list.    Review of Systems  Pertinent items are noted in HPI.      Objective      /73 (BP Location: Left arm, Patient Position: Sitting, Cuff Size: Standard)   Pulse 76   Wt 124 kg (272 lb 9.6 oz)   LMP  (LMP Unknown)   BMI 47.53 kg/m²     General: alert and oriented, in no acute distress, alert, appears stated age, and cooperative   Heart: NSR   Lungs: clear to auscultation bilaterally, WNL respiratory effort, negative cough or SOB   Thyroid: Negative masses palpable   Abdomen: soft, non-tender, without masses or organomegaly palpable   Vulva: normal   Vagina: normal mucosa   Cervix: no cervical motion tenderness and no lesions, IUD string noted at cervical os   Uterus: normal size, non-tender, normal shape and consistency   Adnexa: normal adnexa   Urethra: normal   Breasts: NT,negative masses palpable, negative discharge, or dimpling

## 2024-11-14 DIAGNOSIS — G43.109 MIGRAINE WITH AURA AND WITHOUT STATUS MIGRAINOSUS, NOT INTRACTABLE: ICD-10-CM

## 2024-11-14 RX ORDER — SUMATRIPTAN SUCCINATE 25 MG/1
25 TABLET ORAL ONCE AS NEEDED
Qty: 18 TABLET | Refills: 0 | Status: SHIPPED | OUTPATIENT
Start: 2024-11-14

## 2024-11-18 ENCOUNTER — OFFICE VISIT (OUTPATIENT)
Age: 24
End: 2024-11-18
Payer: MEDICARE

## 2024-11-18 VITALS
RESPIRATION RATE: 14 BRPM | WEIGHT: 275.8 LBS | SYSTOLIC BLOOD PRESSURE: 110 MMHG | BODY MASS INDEX: 47.08 KG/M2 | HEART RATE: 78 BPM | TEMPERATURE: 97.3 F | DIASTOLIC BLOOD PRESSURE: 70 MMHG | HEIGHT: 64 IN

## 2024-11-18 DIAGNOSIS — R73.03 PREDIABETES: Primary | ICD-10-CM

## 2024-11-18 DIAGNOSIS — E66.01 OBESITY, CLASS III, BMI 40-49.9 (MORBID OBESITY) (HCC): ICD-10-CM

## 2024-11-18 PROCEDURE — 99214 OFFICE O/P EST MOD 30 MIN: CPT | Performed by: PHYSICIAN ASSISTANT

## 2024-11-18 RX ORDER — TIRZEPATIDE 2.5 MG/.5ML
2.5 INJECTION, SOLUTION SUBCUTANEOUS WEEKLY
Qty: 2 ML | Refills: 0 | Status: SHIPPED | OUTPATIENT
Start: 2024-11-18 | End: 2024-12-16

## 2024-11-18 NOTE — PROGRESS NOTES
Assessment/Plan:    1. Prediabetes  tirzepatide (Zepbound) 2.5 mg/0.5 mL auto-injector      2. Obesity, Class III, BMI 40-49.9 (morbid obesity) ([facility])  tirzepatide (Zepbound) 2.5 mg/0.5 mL auto-injector            Initial:  275lbs  Current: 275 lbs  Change:  lbs  Goal: 220 lbs; goal of 20% TBW short term goal      - Weight not at goal  - Patient is interested in Conservative Program  - Labs reviewed: As below.    - Discussed options of HealthyCORE-Intensive Lifestyle Intervention Program, Very Low Calorie Diet-VLCD, and Conservative Program and the role of weight loss medications.  - Explained the importance of continuing lifestyle changes while on an anti-obesity medication.  - Patient should demonstrate lifestyle changes first before anti-obesity medication initiated.   - Patient is interested in pursuing Conservative Program  -I have sent Zebound to your pharmacy. The prior authorization process will been done through our prior authorization team and can take up to 3-4 weeks to process through the insurance.     - Start Zepbound 2.5 mg subcutaneously weekly. After you have taken the second pen, please give me an update, as we will likely increase the dose the next month if you are tolerating it well.    - Side effects of Zepbound include nausea, vomiting, diarrhea, or constipation. Keep an eye on your heart rate while on Zepbound. If you resting heart rate is greater than 100 beats per minutes, please notify me. If you develop severe abdominal pain, stop Zepbound and go to the emergency room, as that could be a sign of pancreatitis.     - Please notify me if you have surgery, upper endoscopy, or colonoscopy scheduled, as we typically hold Zepbound for one week prior to the procedure.     - Zepbound can reduce the effectiveness of oral hormonal birth control (birth control pills). Recommend a barrier backup method such as condoms to prevent pregnancy.     - Weight loss can improve patient's co-morbid conditions  and/or prevent weight-related complications.  -- Labs reviewed: As below.      General Recommendations:  Nutrition:  Eat breakfast daily.  Do not skip meals.     Food log (ie.) www.[employer].com, sparkpeople.com, loseit.com, calorieking.com, etc.    Practice mindful eating.  Be sure to set aside time to eat, eat slowly, and savor your food.    Hydration:    At least 64oz of water daily.  No sugar sweetened beverages.  No juice (eat the fruit instead).    Exercise:  Studies have shown that the ideal exercise goal is somewhere between 150 to 300 minutes of moderate intensity exercise a week.  Start with exercising 10 minutes every other day and gradually increase physical activity with a goal of at least 150 minutes of moderate intensity exercise a week, divided over at least 3 days a week.  An example of this would be exercising 30 minutes a day, 5 days a week.  Resistance training can increase muscle mass and increase our resting metabolic rate.   FULL BODY resistance training is recommended 2-3 times a week.  Do not do this on consecutive days to allow for muscle recovery.    Aim for a bare minimum 5000 steps, even on days you do not exercise.    Monitoring:   Weigh yourself daily.  If this causes undue stress, then just weigh yourself once a week.  Weigh yourself the same time of the day with the same amount of clothing on.  Preferably this should be done after waking up, before you eat, and with no clothing or minimal clothing on.    Specific Goals:  7028-0008 calories per day  Gradually increase physical activity to a goal of 5 days per week for 30 minutes of MODERATE intensity PLUS 2 days per week of FULL BODY resistance training  Goal protein intake of 60-80 grams per day    Calorie goal:  4226-2831 (Provided with meal plan to follow).    Return visit:  2 months          Return visit:    Calorie tracking/deficit  Physical activity goals  AOM  Phentermine- no contraindications; EKG normal 2024  Contrave- no  contraindications   Topiramate- denies hx of renal stones  GLP-1- has IUD; sending in RX for Zepbound today.  RTC 2 months     Subjective:   Chief Complaint   Patient presents with    Follow-up     MWM- 3 mo F/u; Waist-43in       Patient ID: Gus Jurado  is a 24 y.o. female with excess weight/obesity here to pursue weight management.  Previous notes and records have been reviewed. [email or web address]   translated the office visit.     Past Medical History:   Diagnosis Date    Chlamydia 2018     Past Surgical History:   Procedure Laterality Date     SECTION      3    [place]  DELIVERY ONLY N/A 3/29/2021    Procedure:  SECTION () REPEAT;  Surgeon: Patel Moreira MD;  Location: [facility];  Service: Obstetrics       HPI:  Wt Readings from Last 20 Encounters:   24 125 kg (275 lb 12.8 oz)   10/30/24 124 kg (272 lb 9.6 oz)   24 122 kg (270 lb)   07/15/24 122 kg (269 lb)   24 127 kg (280 lb 6.4 oz)   24 106 kg (234 lb 3 oz)   10/06/23 118 kg (260 lb)   23 122 kg (270 lb)   22 118 kg (261 lb 3.9 oz)   22 112 kg (248 lb)   07/15/22 110 kg (242 lb 12.8 oz)   22 108 kg (237 lb 12.8 oz)   22 110 kg (242 lb)   21 114 kg (251 lb)   21 110 kg (243 lb)   21 108 kg (238 lb)   21 110 kg (242 lb 12.8 oz)   21 115 kg (253 lb)   21 116 kg (255 lb)   21 114 kg (251 lb 12.8 oz)     Obesity/Excess Weight:  Severity: Severe  Onset:  since last pregnancy 3 years ago    Modifiers: Diet and Exercise  Contributing factors: Pregnancy  Associated symptoms: comorbid conditions      In the interim, patient had an EKG it was within normal limits.   My diet and exercise has not changed.  Patient is working now and is moving .  No longer breastfeeding baby.          Sometimes feels hungry. Tends to eat out of boredom or when stressed. No cravings.       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    Family History   Problem Relation Age of Onset    No Known Problems Mother     No Known Problems Father     No Known Problems Sister     No Known Problems Brother     No Known Problems Daughter     No Known Problems Maternal Grandmother     No Known Problems Maternal Grandfather     Ovarian cancer Paternal Grandmother     Cancer Neg Hx     Diabetes Neg Hx         Review of Systems   HENT:  Negative for sore throat.    Respiratory:  Negative for cough and shortness of breath.    Cardiovascular:  Negative for chest pain and palpitations.   Gastrointestinal:  Negative for abdominal pain, constipation, diarrhea, nausea and vomiting.        Denies GERD   Endocrine: Negative for cold intolerance and heat intolerance.   Genitourinary:  Negative for dysuria.   Musculoskeletal:  Positive for back pain (intermittent). Negative for arthralgias.   Skin:  Negative for rash.   Neurological:  Positive for headaches (has migraines).   Psychiatric/Behavioral:  Negative for suicidal ideas (or HI).         Denies depression   + anxiety situational       Objective:  /70 (BP Location: Left arm, Patient Position: Sitting)   Pulse 78   Temp (!) 97.3 °F (36.3 °C) (Tympanic)   Resp 14   Ht 5' 3.5\" (1.613 m)   Wt 125 kg (275 lb 12.8 oz)   LMP  (LMP " Unknown)   BMI 48.09 kg/m²     Physical Exam  Vitals and nursing note reviewed.      Constitutional   General appearance: Abnormal.  well developed and morbidly obese.   Eyes No conjunctival injection.   Ears, Nose, Mouth, and Throat Oral mucosa moist.   Pulmonary   Respiratory effort: No increased work of breathing or signs of respiratory distress.    Cardiovascular    Examination of extremities for edema and/or varicosities: Normal.  no edema.   Abdomen   Abdomen: Abnormal.  The abdomen was obese.    Musculoskeletal   Normal range of motion  Neurological   Gait and station: Normal.   Psychiatric   Orientation to person, place and time: Normal.    Affect: appropriate

## 2024-11-18 NOTE — PATIENT INSTRUCTIONS
I have sent Zebound to your pharmacy. The prior authorization process will been done through our prior authorization team and can take up to 3-4 weeks to process through the insurance.     - Start Zepbound 2.5 mg subcutaneously weekly. After you have taken the second pen, please give me an update, as we will likely increase the dose the next month if you are tolerating it well.    - Side effects of Zepbound include nausea, vomiting, diarrhea, or constipation. Keep an eye on your heart rate while on Zepbound. If you resting heart rate is greater than 100 beats per minutes, please notify me. If you develop severe abdominal pain, stop Zepbound and go to the emergency room, as that could be a sign of pancreatitis.     - Please notify me if you have surgery, upper endoscopy, or colonoscopy scheduled, as we typically hold Zepbound for one week prior to the procedure.     - Zepbound can reduce the effectiveness of oral hormonal birth control (birth control pills). Recommend a barrier backup method such as condoms to prevent pregnancy.       GLP-1 Managing Side Effects Tips:  - focus on small frequent meals  - moderate sugar and fat intake  - change the injection site (abdomen --> thigh--> back of arm)  - eat protein, crackers, mints and jadyn-based drinks  - nighttime injections  - drink plenty of water  - consider fiber supplements like miralax    Tips for Nausea:  - Don't drink and eat simultaneously: separate fluids 30-60 minutes before and after meals when experiencing nausea or if you notice you become full quickly  - Choose mild smelling foods- strong smells can exacerbate nausea  - Jadyn and peppermint- consider drinking jadyn or peppermint tea, which can help alleviate symptoms  - Eat- don't skip meals, if you have nausea, it is understandable that you may not feel like eating. However, skipping meals is generally not recommended as this can lead to lower blood sugar and fatigue. Furthermore, it is essential to  consume adequate protein during weight loss. You can opt for a protein shake, a meal replacement supplement, bone broth or soup.     Tips for Constipation:   - Drink plenty of water  - Eat food with a high fiber content: increase your fiber intake by consuming these types of foods:   Eat more whole grain products like barley, oats, farro, brown or wild rice and quinoa.    Look for choices with 100% whole wheat, rye, oats or bran as the first ingredient listed    Check nutrition fact labels and choose products with 4gm of dietary fiber or more per serving   Add more beans to your diet   Eat more fresh fruits and vegetables with skins on them    Exercise- increase physical activity as it helps stimulate gastric mobility, which moves stool through the digestive tract     If the medication is not approved, we will try one of the oral medications we discussed like phentermine.     Physical Activity RX:  Please try to get in 150 minutes of activity weekly.  Walking is great and we can discuss strength training at our next visit.     Nutrition RX:  Please try to focus on getting 30 grams of protein per meal and 28-30 grams of fiber per meal.    Calorie Goal 5806-4609 calories/day  Try to decrease juice to only 1x per day and eventually we will try to space out to 1-2 times per week to decrease sugar.

## 2024-11-20 ENCOUNTER — TELEPHONE (OUTPATIENT)
Age: 24
End: 2024-11-20

## 2024-11-20 NOTE — TELEPHONE ENCOUNTER
PA for Zepbound SUBMITTED to [employer]    via    [x]CMM-KEY: [id number]  []Surescripts-Case ID #    []Availity-Auth ID #  NDC #    []Faxed to plan   []Other website    []Phone call Case ID #      []PA sent as URGENT    All office notes, labs and other pertaining documents and studies sent. Clinical questions answered. Awaiting determination from insurance company.     Turnaround time for your insurance to make a decision on your Prior Authorization can take 7-21 business days.

## 2024-11-21 NOTE — TELEPHONE ENCOUNTER
PA for Zepbound  APPROVED     Date(s) approved 11/20/2024-5/21/2025    Case #    Patient advised by          []MyChart Message  [x]Phone call   []LMOM  []L/M to call office as no active Communication consent on file  []Unable to leave detailed message as VM not approved on Communication consent       Pharmacy advised by    []Fax  [x]Phone call    Approval letter scanned into [employer] Yes

## 2024-12-11 DIAGNOSIS — E66.01 OBESITY, CLASS III, BMI 40-49.9 (MORBID OBESITY) (HCC): ICD-10-CM

## 2024-12-11 DIAGNOSIS — R73.03 PREDIABETES: ICD-10-CM

## 2024-12-12 DIAGNOSIS — E66.01 OBESITY, CLASS III, BMI 40-49.9 (MORBID OBESITY) (HCC): ICD-10-CM

## 2024-12-12 DIAGNOSIS — R73.03 PREDIABETES: Primary | ICD-10-CM

## 2024-12-12 RX ORDER — TIRZEPATIDE 5 MG/.5ML
5 INJECTION, SOLUTION SUBCUTANEOUS WEEKLY
Qty: 2 ML | Refills: 2 | Status: SHIPPED | OUTPATIENT
Start: 2024-12-12 | End: 2025-03-06

## 2024-12-12 RX ORDER — TIRZEPATIDE 2.5 MG/.5ML
2.5 INJECTION, SOLUTION SUBCUTANEOUS WEEKLY
Qty: 2 ML | Refills: 0 | Status: SHIPPED | OUTPATIENT
Start: 2024-12-12 | End: 2025-01-09

## 2024-12-22 DIAGNOSIS — G43.109 MIGRAINE WITH AURA AND WITHOUT STATUS MIGRAINOSUS, NOT INTRACTABLE: ICD-10-CM

## 2024-12-23 RX ORDER — SUMATRIPTAN SUCCINATE 25 MG/1
25 TABLET ORAL ONCE AS NEEDED
Qty: 18 TABLET | Refills: 0 | Status: SHIPPED | OUTPATIENT
Start: 2024-12-23

## 2025-01-13 ENCOUNTER — OFFICE VISIT (OUTPATIENT)
Dept: FAMILY MEDICINE CLINIC | Facility: CLINIC | Age: 25
End: 2025-01-13

## 2025-01-13 VITALS
TEMPERATURE: 97.9 F | BODY MASS INDEX: 46.26 KG/M2 | HEART RATE: 79 BPM | DIASTOLIC BLOOD PRESSURE: 68 MMHG | OXYGEN SATURATION: 99 % | RESPIRATION RATE: 18 BRPM | WEIGHT: 271 LBS | HEIGHT: 64 IN | SYSTOLIC BLOOD PRESSURE: 118 MMHG

## 2025-01-13 DIAGNOSIS — G43.109 MIGRAINE WITH AURA AND WITHOUT STATUS MIGRAINOSUS, NOT INTRACTABLE: ICD-10-CM

## 2025-01-13 DIAGNOSIS — Z00.00 ANNUAL PHYSICAL EXAM: Primary | ICD-10-CM

## 2025-01-13 PROCEDURE — 99395 PREV VISIT EST AGE 18-39: CPT

## 2025-01-13 PROCEDURE — 99214 OFFICE O/P EST MOD 30 MIN: CPT

## 2025-01-13 NOTE — PROGRESS NOTES
Adult Annual Physical  Name: Gus Jurado      : 2000      MRN: [id number]  Encounter Provider: SILVIA Duran  Encounter Date: 2025   Encounter department: [facility]    Assessment & Plan  Annual physical exam         Migraine with aura and without status migrainosus, not intractable  Continue propranolol, sumatriptan, naproxen, reglan prn.        Immunizations and preventive care screenings were discussed with patient today. Appropriate education was printed on patient's after visit summary.    Counseling:  Dental Health: discussed importance of regular tooth brushing, flossing, and dental visits.  Exercise: the importance of regular exercise/physical activity was discussed. Recommend exercise 3-5 times per week for at least 30 minutes.     BMI Counseling: Body mass index is 47.25 kg/m². The BMI is above normal. Nutrition recommendations include decreasing portion sizes, encouraging healthy choices of fruits and vegetables, decreasing fast food intake, consuming healthier snacks, limiting drinks that contain sugar, moderation in carbohydrate intake, increasing intake of lean protein, reducing intake of saturated and trans fat and reducing intake of cholesterol. Exercise recommendations include moderate physical activity 150 minutes/week. No pharmacotherapy was ordered. Rationale for BMI follow-up plan is due to patient being overweight or obese.         History of Present Illness     Adult Annual Physical:  Patient presents for annual physical. Here today for a migraines follow-up. She reports that since she was started on propranolol she has only been having migraines 3-4 times a month. Prior to this, they were  occurring about  6 times a week. She takes naproxen and sumatriptan when she does get migraines and they are effective. .     Diet and Physical Activity:  - Diet/Nutrition: well balanced diet.  - Exercise: 3-4 times a week on average  "and walking.    General Health:  - Sleep: 7-8 hours of sleep on average and sleeps well.  - Hearing: normal hearing bilateral ears.  - Vision: wears glasses, vision problems and most recent eye exam < 1 year ago.  - Dental: regular dental visits and brushes teeth three times daily. flosses    /GYN Health:  - Follows with GYN: yes.   - Menopause: premenopausal.   - History of STDs: no  - Contraception: IUD placement.      Review of Systems   Constitutional:  Negative for chills and fever.   HENT:  Negative for ear pain and sore throat.    Eyes:  Negative for pain and visual disturbance.   Respiratory:  Negative for cough and shortness of breath.    Cardiovascular:  Negative for chest pain and palpitations.   Gastrointestinal:  Negative for abdominal pain and vomiting.   Genitourinary:  Negative for dysuria and hematuria.   Musculoskeletal:  Negative for arthralgias and back pain.   Skin:  Negative for color change and rash.   Neurological:  Negative for seizures and syncope.   All other systems reviewed and are negative.        Objective   /68 (BP Location: Left arm, Patient Position: Sitting, Cuff Size: Large)   Pulse 79   Temp 97.9 °F (36.6 °C) (Temporal)   Resp 18   Ht 5' 3.5\" (1.613 m)   Wt 123 kg (271 lb)   LMP  (LMP Unknown)   SpO2 99%   BMI 47.25 kg/m²     Physical Exam  Vitals and nursing note reviewed.   Constitutional:       General: She is not in acute distress.     Appearance: Normal appearance. She is well-developed. She is obese.   HENT:      Head: Normocephalic and atraumatic.      Right Ear: External ear normal.      Left Ear: External ear normal.      Nose: Nose normal.   Eyes:      Conjunctiva/sclera: Conjunctivae normal.   Cardiovascular:      Rate and Rhythm: Normal rate and regular rhythm.      Pulses: Normal pulses.      Heart sounds: Normal heart sounds. No murmur heard.  Pulmonary:      Effort: Pulmonary effort is normal. No respiratory distress.      Breath sounds: Normal " breath sounds.   Abdominal:      Palpations: Abdomen is soft.      Tenderness: There is no abdominal tenderness.   Musculoskeletal:         General: Normal range of motion.      Cervical back: Normal range of motion and neck supple.   Skin:     General: Skin is warm and dry.   Neurological:      Mental Status: She is alert and oriented to person, place, and time. Mental status is at baseline.   Psychiatric:         Mood and Affect: Mood normal.         Behavior: Behavior normal.         Thought Content: Thought content normal.         Judgment: Judgment normal.

## 2025-01-13 NOTE — PATIENT INSTRUCTIONS
"Patient Education     Routine physical for adults   The Basics   Written by the doctors and editors at [facility]   What is a physical? -- A physical is a routine visit, or \"check-up,\" with your doctor. You might also hear it called a \"wellness visit\" or \"preventive visit.\"  During each visit, the doctor will:   Ask about your physical and mental health   Ask about your habits, behaviors, and lifestyle   Do an exam   Give you vaccines if needed   Talk to you about any medicines you take   Give advice about your health   Answer your questions  Getting regular check-ups is an important part of taking care of your health. It can help your doctor find and treat any problems you have. But it's also important for preventing health problems.  A routine physical is different from a \"sick visit.\" A sick visit is when you see a doctor because of a health concern or problem. Since physicals are scheduled ahead of time, you can think about what you want to ask the doctor.  How often should I get a physical? -- It depends on your age and health. In general, for people age 21 years and older:   If you are younger than 50 years, you might be able to get a physical every 3 years.   If you are 50 years or older, your doctor might recommend a physical every year.  If you have an ongoing health condition, like diabetes or high blood pressure, your doctor will probably want to see you more often.  What happens during a physical? -- In general, each visit will include:   Physical exam - The doctor or nurse will check your height, weight, heart rate, and blood pressure. They will also look at your eyes and ears. They will ask about how you are feeling and whether you have any symptoms that bother you.   Medicines - It's a good idea to bring a list of all the medicines you take to each doctor visit. Your doctor will talk to you about your medicines and answer any questions. Tell them if you are having any side effects that bother you. You " "should also tell them if you are having trouble paying for any of your medicines.   Habits and behaviors - This includes:   Your diet   Your exercise habits   Whether you smoke, drink alcohol, or use drugs   Whether you are sexually active   Whether you feel safe at home  Your doctor will talk to you about things you can do to improve your health and lower your risk of health problems. They will also offer help and support. For example, if you want to quit smoking, they can give you advice and might prescribe medicines. If you want to improve your diet or get more physical activity, they can help you with this, too.   Lab tests, if needed - The tests you get will depend on your age and situation. For example, your doctor might want to check your:   Cholesterol   Blood sugar   Iron level   Vaccines - The recommended vaccines will depend on your age, health, and what vaccines you already had. Vaccines are very important because they can prevent certain serious or deadly infections.   Discussion of screening - \"Screening\" means checking for diseases or other health problems before they cause symptoms. Your doctor can recommend screening based on your age, risk, and preferences. This might include tests to check for:   Cancer, such as breast, prostate, cervical, ovarian, colorectal, prostate, lung, or skin cancer   Sexually transmitted infections, such as chlamydia and gonorrhea   Mental health conditions like depression and anxiety  Your doctor will talk to you about the different types of screening tests. They can help you decide which screenings to have. They can also explain what the results might mean.   Answering questions - The physical is a good time to ask the doctor or nurse questions about your health. If needed, they can refer you to other doctors or specialists, too.  Adults older than 65 years often need other care, too. As you get older, your doctor will talk to you about:   How to prevent falling at " home   Hearing or vision tests   Memory testing   How to take your medicines safely   Making sure that you have the help and support you need at home  All topics are updated as new evidence becomes available and our peer review process is complete.  This topic retrieved from [employer] on: May 02, 2024.  Topic 331107 Version 1.0  Release: 32.4.3 - C32.122  © 2024 UpToDate, Inc. and/or its affiliates. All rights reserved.  Consumer Information Use and Disclaimer   Disclaimer: This generalized information is a limited summary of diagnosis, treatment, and/or medication information. It is not meant to be comprehensive and should be used as a tool to help the user understand and/or assess potential diagnostic and treatment options. It does NOT include all information about conditions, treatments, medications, side effects, or risks that may apply to a specific patient. It is not intended to be medical advice or a substitute for the medical advice, diagnosis, or treatment of a health care provider based on the health care provider's examination and assessment of a patient's specific and unique circumstances. Patients must speak with a health care provider for complete information about their health, medical questions, and treatment options, including any risks or benefits regarding use of medications. This information does not endorse any treatments or medications as safe, effective, or approved for treating a specific patient. UpToDate, Inc. and its affiliates disclaim any warranty or liability relating to this information or the use thereof.The use of this information is governed by the Terms of Use, available at https://www.wolterseflowuwer.com/en/know/clinical-effectiveness-terms. 2024© UpToDate, Inc. and its affiliates and/or licensors. All rights reserved.  Copyright   © 2024 Up[employer]Date, [employer]. and/or its affiliates. All rights reserved.

## 2025-01-20 ENCOUNTER — OFFICE VISIT (OUTPATIENT)
Age: 25
End: 2025-01-20
Payer: MEDICARE

## 2025-01-20 VITALS
HEIGHT: 64 IN | BODY MASS INDEX: 45.55 KG/M2 | RESPIRATION RATE: 16 BRPM | WEIGHT: 266.8 LBS | DIASTOLIC BLOOD PRESSURE: 70 MMHG | SYSTOLIC BLOOD PRESSURE: 100 MMHG | HEART RATE: 80 BPM | TEMPERATURE: 97 F

## 2025-01-20 DIAGNOSIS — R73.03 PREDIABETES: ICD-10-CM

## 2025-01-20 DIAGNOSIS — E66.01 OBESITY, CLASS III, BMI 40-49.9 (MORBID OBESITY) (HCC): Primary | ICD-10-CM

## 2025-01-20 PROCEDURE — 99213 OFFICE O/P EST LOW 20 MIN: CPT | Performed by: PHYSICIAN ASSISTANT

## 2025-01-20 RX ORDER — TIRZEPATIDE 5 MG/.5ML
5 INJECTION, SOLUTION SUBCUTANEOUS WEEKLY
Qty: 2 ML | Refills: 2 | Status: SHIPPED | OUTPATIENT
Start: 2025-01-20 | End: 2025-04-14

## 2025-01-20 NOTE — PATIENT INSTRUCTIONS
- Side effects of Zepbound include nausea, vomiting, diarrhea, or constipation. Keep an eye on your heart rate while on Zepbound. If you resting heart rate is greater than 100 beats per minutes, please notify me. If you develop severe abdominal pain, stop Zepbound and go to the emergency room, as that could be a sign of pancreatitis.     - Please notify me if you have surgery, upper endoscopy, or colonoscopy scheduled, as we typically hold Zepbound for one week prior to the procedure.     - Zepbound can reduce the effectiveness of oral hormonal birth control (birth control pills). Recommend a barrier backup method such as condoms to prevent pregnancy.

## 2025-01-20 NOTE — PROGRESS NOTES
Assessment/Plan:    1. Obesity, Class III, BMI 40-49.9 (morbid obesity) (HCC)  tirzepatide (Zepbound) 5 mg/0.5 mL auto-injector      2. Prediabetes  tirzepatide (Zepbound) 5 mg/0.5 mL auto-injector              Initial:  275lbs  Current: 266 lbs  Change: -9 lbs  Goal: 220 lbs; goal of 20% TBW short term goal      - Weight not at goal  - Patient is interested in Conservative Program  - Labs reviewed: As below.    - Discussed options of HealthyCORE-Intensive Lifestyle Intervention Program, Very Low Calorie Diet-VLCD, and Conservative Program and the role of weight loss medications.  - Explained the importance of continuing lifestyle changes while on an anti-obesity medication.  - Patient should demonstrate lifestyle changes first before anti-obesity medication initiated.   - Patient is interested in pursuing Conservative Program  -I have sent Zebound to your pharmacy. The prior authorization process will been done through our prior authorization team and can take up to 3-4 weeks to process through the insurance.     - Side effects of Zepbound include nausea, vomiting, diarrhea, or constipation. Keep an eye on your heart rate while on Zepbound. If you resting heart rate is greater than 100 beats per minutes, please notify me. If you develop severe abdominal pain, stop Zepbound and go to the emergency room, as that could be a sign of pancreatitis.     - Please notify me if you have surgery, upper endoscopy, or colonoscopy scheduled, as we typically hold Zepbound for one week prior to the procedure.     - Zepbound can reduce the effectiveness of oral hormonal birth control (birth control pills). Recommend a barrier backup method such as condoms to prevent pregnancy.     - Weight loss can improve patient's co-morbid conditions and/or prevent weight-related complications.  -- Labs reviewed: As below.      General Recommendations:  Nutrition:  Eat breakfast daily.  Do not skip meals.     Food log (ie.) www.myfitnesspal.com,  sparkpeople.com, loseit.com, calorieking.com, etc.    Practice mindful eating.  Be sure to set aside time to eat, eat slowly, and savor your food.    Hydration:    At least 64oz of water daily.  No sugar sweetened beverages.  No juice (eat the fruit instead).    Exercise:  Studies have shown that the ideal exercise goal is somewhere between 150 to 300 minutes of moderate intensity exercise a week.  Start with exercising 10 minutes every other day and gradually increase physical activity with a goal of at least 150 minutes of moderate intensity exercise a week, divided over at least 3 days a week.  An example of this would be exercising 30 minutes a day, 5 days a week.  Resistance training can increase muscle mass and increase our resting metabolic rate.   FULL BODY resistance training is recommended 2-3 times a week.  Do not do this on consecutive days to allow for muscle recovery.    Aim for a bare minimum 5000 steps, even on days you do not exercise.    Monitoring:   Weigh yourself daily.  If this causes undue stress, then just weigh yourself once a week.  Weigh yourself the same time of the day with the same amount of clothing on.  Preferably this should be done after waking up, before you eat, and with no clothing or minimal clothing on.    Specific Goals:  6105-3648 calories per day  Gradually increase physical activity to a goal of 5 days per week for 30 minutes of MODERATE intensity PLUS 2 days per week of FULL BODY resistance training  Goal protein intake of 60-80 grams per day    Calorie goal:  7687-1692 (Provided with meal plan to follow).        Return visit:  2 months   Continue Zepbound 5mg weekly     - Side effects of Zepbound include nausea, vomiting, diarrhea, or constipation. Keep an eye on your heart rate while on Zepbound. If you resting heart rate is greater than 100 beats per minutes, please notify me. If you develop severe abdominal pain, stop Zepbound and go to the emergency room, as that  could be a sign of pancreatitis.     - Please notify me if you have surgery, upper endoscopy, or colonoscopy scheduled, as we typically hold Zepbound for one week prior to the procedure.     - Zepbound can reduce the effectiveness of oral hormonal birth control (birth control pills). Recommend a barrier backup method such as condoms to prevent pregnancy.     Calorie tracking/deficit- encouraged tracking on [employer]. Discussed with patient the importance of not skipping breakfast and eating protein/fiber with each meal.  We will not go up on dose today as the patient has been skipping breakfast and sometimes dinner as well.   Physical activity goals- discussed increasing to 3-4 days per week; 30 mins per session. Patient is starting at a gym this week.   AOM  Phentermine- no contraindications; EKG normal   Contrave- no contraindications   Topiramate- denies hx of renal stones  GLP-1- has IUD;      Subjective:   Chief Complaint   Patient presents with    Follow-up     MWM- 6 mo F/u; Waist-43.5in       Patient ID: Gus Jurado  is a 24 y.o. female with excess weight/obesity here to pursue weight management.  Previous notes and records have been reviewed. [employer]   translated the office visit.     Patient has been taking Zepbound 5mg and has been doing well on this dose.  She has some gas after the injection but denies constipation and nausea. Patient reports her appetite is suppressed.      Diet Recall 25  B- typically not eating too much  L- chicken/beef and rice   D- coconut water, not very hungry. Tends to eat larger lunch and skip   *trying to eat more vegetables but not eating too many right now*      Physical Activity:   Shun exercise in her home 3 days per week   *plans to start going to the gym this week.     Past Medical History:   Diagnosis Date    Chlamydia 2018     Past Surgical History:   Procedure Laterality Date     SECTION      3    ME  DELIVERY ONLY N/A  3/29/2021    Procedure:  SECTION () REPEAT;  Surgeon: Patel Moreira MD;  Location: [facility];  Service: Obstetrics       HPI:  Wt Readings from Last 20 Encounters:   25 121 kg (266 lb 12.8 oz)   25 123 kg (271 lb)   24 125 kg (275 lb 12.8 oz)   10/30/24 124 kg (272 lb 9.6 oz)   24 122 kg (270 lb)   07/15/24 122 kg (269 lb)   24 127 kg (280 lb 6.4 oz)   24 106 kg (234 lb 3 oz)   10/06/23 118 kg (260 lb)   23 122 kg (270 lb)   22 118 kg (261 lb 3.9 oz)   22 112 kg (248 lb)   07/15/22 110 kg (242 lb 12.8 oz)   22 108 kg (237 lb 12.8 oz)   22 110 kg (242 lb)   21 114 kg (251 lb)   21 110 kg (243 lb)   21 108 kg (238 lb)   21 110 kg (242 lb 12.8 oz)   21 115 kg (253 lb)     Obesity/Excess Weight:  Severity: Severe  Onset:  since last pregnancy 3 years ago    Modifiers: Diet and Exercise  Contributing factors: Pregnancy  Associated symptoms: comorbid conditions      In the interim, patient had an EKG it was within normal limits.   My diet and exercise has not changed.  Patient is working now and is moving .  No longer breastfeeding baby.          Sometimes feels hungry. Tends to eat out of boredom or when stressed. No cravings.       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    Family History   Problem Relation Age of Onset    No Known Problems Mother     No Known Problems Father     No Known Problems Sister     No Known Problems Brother     No Known Problems Daughter     No Known Problems Maternal Grandmother     No Known Problems Maternal Grandfather     Ovarian cancer Paternal Grandmother     Cancer Neg Hx     Diabetes Neg Hx         Review of Systems   HENT:  Negative for sore throat.    Respiratory:  Negative for cough and shortness of breath.    Cardiovascular:  Negative for chest pain and palpitations.   Gastrointestinal:  Negative for abdominal pain, constipation, diarrhea, nausea and vomiting.        Denies GERD   Endocrine: Negative for cold intolerance and heat intolerance.   Genitourinary:  Negative for dysuria.   Musculoskeletal:  Positive for back pain (intermittent). Negative for arthralgias.   Skin:  Negative for rash.   Neurological:  Positive for headaches (has migraines).   Psychiatric/Behavioral:  Negative for suicidal ideas (or HI).         Denies depression   + anxiety situational       Objective:  /70 (BP Location: Left arm, Patient Position: Sitting)   Pulse 80   Temp (!) 97 °F (36.1 °C) (Tympanic)   Resp 16   Ht 5' 3.5\" (1.613 m)   Wt 121 kg (266 lb 12.8 oz)   LMP  (LMP Unknown)   BMI 46.52 kg/m²     Physical Exam  Vitals and nursing note reviewed.        Constitutional   General appearance: Abnormal.  well developed and morbidly obese.   Eyes No conjunctival injection.   Ears, Nose, Mouth, and Throat Oral mucosa moist.   Pulmonary   Respiratory effort: No increased work of breathing or signs of respiratory distress.    Cardiovascular    Examination of extremities for edema and/or varicosities: Normal.  no edema.   Abdomen   Abdomen: Abnormal.  The abdomen was obese.    Musculoskeletal   Normal range of motion  Neurological   Gait and station: Normal.   Psychiatric   Orientation to person, place and " time: Normal.    Affect: appropriate

## 2025-03-01 DIAGNOSIS — G43.109 MIGRAINE WITH AURA AND WITHOUT STATUS MIGRAINOSUS, NOT INTRACTABLE: ICD-10-CM

## 2025-03-03 RX ORDER — SUMATRIPTAN SUCCINATE 25 MG/1
25 TABLET ORAL ONCE AS NEEDED
Qty: 18 TABLET | Refills: 0 | Status: SHIPPED | OUTPATIENT
Start: 2025-03-03

## 2025-03-03 RX ORDER — NAPROXEN SODIUM 220 MG/1
220 TABLET, FILM COATED ORAL EVERY 12 HOURS PRN
Qty: 30 TABLET | Refills: 0 | Status: SHIPPED | OUTPATIENT
Start: 2025-03-03

## 2025-03-24 ENCOUNTER — OFFICE VISIT (OUTPATIENT)
Age: 25
End: 2025-03-24
Payer: MEDICARE

## 2025-03-24 VITALS
SYSTOLIC BLOOD PRESSURE: 102 MMHG | HEART RATE: 82 BPM | TEMPERATURE: 97.9 F | HEIGHT: 64 IN | DIASTOLIC BLOOD PRESSURE: 70 MMHG | WEIGHT: 261.2 LBS | BODY MASS INDEX: 44.59 KG/M2

## 2025-03-24 DIAGNOSIS — E66.01 OBESITY, CLASS III, BMI 40-49.9 (MORBID OBESITY) (HCC): Primary | ICD-10-CM

## 2025-03-24 DIAGNOSIS — R73.03 PREDIABETES: ICD-10-CM

## 2025-03-24 PROCEDURE — 99214 OFFICE O/P EST MOD 30 MIN: CPT | Performed by: PHYSICIAN ASSISTANT

## 2025-03-24 RX ORDER — TIRZEPATIDE 7.5 MG/.5ML
7.5 INJECTION, SOLUTION SUBCUTANEOUS WEEKLY
Qty: 2 ML | Refills: 1 | Status: SHIPPED | OUTPATIENT
Start: 2025-03-24 | End: 2025-06-16

## 2025-03-24 NOTE — PROGRESS NOTES
Assessment/Plan:    1. Obesity, Class III, BMI 40-49.9 (morbid obesity) (HCC)        2. Prediabetes                  Initial:  275lbs  Current: 261 lbs  Change: -14 lbs  Goal: 220 lbs; goal of 20% TBW short term goal      - Weight not at goal  - Patient is interested in Conservative Program  - Labs reviewed: As below.    - Discussed options of HealthyCORE-Intensive Lifestyle Intervention Program, Very Low Calorie Diet-VLCD, and Conservative Program and the role of weight loss medications.  - Explained the importance of continuing lifestyle changes while on an anti-obesity medication.  - Patient should demonstrate lifestyle changes first before anti-obesity medication initiated.   - Patient is interested in pursuing Conservative Program  -I have sent Zebound to your pharmacy. The prior authorization process will been done through our prior authorization team and can take up to 3-4 weeks to process through the insurance.     - Side effects of Zepbound include nausea, vomiting, diarrhea, or constipation. Keep an eye on your heart rate while on Zepbound. If you resting heart rate is greater than 100 beats per minutes, please notify me. If you develop severe abdominal pain, stop Zepbound and go to the emergency room, as that could be a sign of pancreatitis.     - Please notify me if you have surgery, upper endoscopy, or colonoscopy scheduled, as we typically hold Zepbound for one week prior to the procedure.     - Zepbound can reduce the effectiveness of oral hormonal birth control (birth control pills). Recommend a barrier backup method such as condoms to prevent pregnancy.     - Weight loss can improve patient's co-morbid conditions and/or prevent weight-related complications.  -- Labs reviewed: As below.      General Recommendations:  Nutrition:  Eat breakfast daily.  Do not skip meals.     Food log (ie.) www.[employer].com, sparkpeople.com, loseit.com, [email or web address].[email or web address], etc.    Practice mindful eating.  Be sure to  set aside time to eat, eat slowly, and savor your food.    Hydration:    At least 64oz of water daily.  No sugar sweetened beverages.  No juice (eat the fruit instead).    Exercise:  Studies have shown that the ideal exercise goal is somewhere between 150 to 300 minutes of moderate intensity exercise a week.  Start with exercising 10 minutes every other day and gradually increase physical activity with a goal of at least 150 minutes of moderate intensity exercise a week, divided over at least 3 days a week.  An example of this would be exercising 30 minutes a day, 5 days a week.  Resistance training can increase muscle mass and increase our resting metabolic rate.   FULL BODY resistance training is recommended 2-3 times a week.  Do not do this on consecutive days to allow for muscle recovery.    Aim for a bare minimum 5000 steps, even on days you do not exercise.    Monitoring:   Weigh yourself daily.  If this causes undue stress, then just weigh yourself once a week.  Weigh yourself the same time of the day with the same amount of clothing on.  Preferably this should be done after waking up, before you eat, and with no clothing or minimal clothing on.    Specific Goals:  6161-6699 calories per day  Gradually increase physical activity to a goal of 5 days per week for 30 minutes of MODERATE intensity PLUS 2 days per week of FULL BODY resistance training  Goal protein intake of 60-80 grams per day    Calorie goal:  6104-8503 (Provided with meal plan to follow).        Return visit:  2 months   Continue Zepbound- increase today to 7.5 weekly     - Side effects of Zepbound include nausea, vomiting, diarrhea, or constipation. Keep an eye on your heart rate while on Zepbound. If you resting heart rate is greater than 100 beats per minutes, please notify me. If you develop severe abdominal pain, stop Zepbound and go to the emergency room, as that could be a sign of pancreatitis.     - Please notify me if you have surgery,  upper endoscopy, or colonoscopy scheduled, as we typically hold Zepbound for one week prior to the procedure.     - Zepbound can reduce the effectiveness of oral hormonal birth control (birth control pills). Recommend a barrier backup method such as condoms to prevent pregnancy.     Calorie tracking/deficit- encouraged tracking on MyFitnessPal. Discussed with patient the importance of not skipping breakfast and eating protein/fiber with each meal.  We will not go up on dose today as the patient has been skipping breakfast and sometimes dinner as well.   Physical activity goals- discussed increasing to 3-4 days per week; 30 mins per session. Patient is starting at a gym this week.   AOM  Phentermine- no contraindications; EKG normal 2024  Contrave- no contraindications   Topiramate- denies hx of renal stones  GLP-1- has IUD;      Subjective:   Chief Complaint   Patient presents with   • Follow-up     MWM- 2 mo F/u; Waist-39.5in       Patient ID: Gus Jurado  is a 24 y.o. female with excess weight/obesity here to pursue weight management.  Previous notes and records have been reviewed. [employer]   translated the office visit.     Patient has been taking Zepbound 5mg and has been doing well on this dose. No reported side effects. She feels like she is not losing weight as well as she did before. She still reports good appetite suppression, gets full on smaller portions.     Diet Recall:  B- eggs, avocado toast   Reducing artificial juice, makes her own carrot, beet juice  L- chicken, beans, rice  Decreasing plantain  D- similar to lunch but smaller amount as she is not as hungry     Physical Activity:  Weights at her home now. She was going to the gym but did not feel comfortable there so started exercising at home.   Treadmill- 30 mins per day        Previous Visit 1/2025:   Diet Recall 1/20/25  B- typically not eating too much  L- chicken/beef and rice   D- coconut water, not very hungry. Tends to  eat larger lunch and skip   *trying to eat more vegetables but not eating too many right now*      Physical Activity:   Shun exercise in her home 3 days per week   *plans to start going to the gym this week.     Past Medical History:   Diagnosis Date   • Chlamydia      Past Surgical History:   Procedure Laterality Date   •  SECTION      3   • [place]  DELIVERY ONLY N/A 3/29/2021    Procedure:  SECTION () REPEAT;  Surgeon: Patel Moreira MD;  Location: [facility];  Service: Obstetrics       HPI:  Wt Readings from Last 20 Encounters:   25 118 kg (261 lb 3.2 oz)   25 121 kg (266 lb 12.8 oz)   25 123 kg (271 lb)   24 125 kg (275 lb 12.8 oz)   10/30/24 124 kg (272 lb 9.6 oz)   24 122 kg (270 lb)   07/15/24 122 kg (269 lb)   24 127 kg (280 lb 6.4 oz)   24 106 kg (234 lb 3 oz)   10/06/23 118 kg (260 lb)   23 122 kg (270 lb)   22 118 kg (261 lb 3.9 oz)   22 112 kg (248 lb)   07/15/22 110 kg (242 lb 12.8 oz)   22 108 kg (237 lb 12.8 oz)   22 110 kg (242 lb)   21 114 kg (251 lb)   21 110 kg (243 lb)   21 108 kg (238 lb)   21 110 kg (242 lb 12.8 oz)     Obesity/Excess Weight:  Severity: Severe  Onset:  since last pregnancy 3 years ago    Modifiers: Diet and Exercise  Contributing factors: Pregnancy  Associated symptoms: comorbid conditions      In the interim, patient had an EKG it was within normal limits.   My diet and exercise has not changed.  Patient is working now and is moving .  No longer breastfeeding baby.          Sometimes feels hungry. Tends to eat out of boredom or when stressed. No cravings.       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    Family History   Problem Relation Age of Onset   • No Known Problems Mother    • No Known Problems Father    • No Known Problems Sister    • No Known Problems Brother    • No Known Problems Daughter    • No Known Problems Maternal Grandmother    • No Known Problems Maternal Grandfather    • Ovarian cancer Paternal Grandmother    • Cancer Neg Hx    • Diabetes Neg Hx         Review of Systems   HENT:  Negative for sore throat.    Respiratory:  Negative for cough and shortness of breath.    Cardiovascular:  Negative for chest pain and palpitations.   Gastrointestinal:  Negative for abdominal pain, constipation, diarrhea, nausea and vomiting.        Denies GERD   Endocrine: Negative for cold intolerance and heat intolerance.   Genitourinary:  Negative for dysuria.   Musculoskeletal:  Positive for back pain (intermittent). Negative for arthralgias.   Skin:  Negative for rash.   Neurological:  Positive for headaches (has migraines).   Psychiatric/Behavioral:  Negative for suicidal ideas (or HI).         Denies depression   + anxiety situational       Objective:  /70 (BP Location: Left arm, Patient Position: Sitting)   Pulse 82   Temp 97.9 °F (36.6 °C) (Tympanic)   Ht 5' 3.5\" (1.613 m)   Wt 118 kg (261 lb 3.2 oz)   BMI 45.54 kg/m²     Physical Exam  Vitals and nursing note reviewed.      Constitutional   General appearance: Abnormal.  well developed and morbidly obese.   Eyes No conjunctival injection.   Ears, Nose, Mouth, and Throat Oral mucosa moist.   Pulmonary "   Respiratory effort: No increased work of breathing or signs of respiratory distress.    Cardiovascular    Examination of extremities for edema and/or varicosities: Normal.  no edema.   Abdomen   Abdomen: Abnormal.  The abdomen was obese.    Musculoskeletal   Normal range of motion  Neurological   Gait and station: Normal.   Psychiatric   Orientation to person, place and time: Normal.    Affect: appropriate

## 2025-04-28 DIAGNOSIS — E66.813 OBESITY, CLASS III, BMI 40-49.9 (MORBID OBESITY): ICD-10-CM

## 2025-04-28 DIAGNOSIS — R73.03 PREDIABETES: ICD-10-CM

## 2025-04-29 RX ORDER — TIRZEPATIDE 7.5 MG/.5ML
7.5 INJECTION, SOLUTION SUBCUTANEOUS WEEKLY
Qty: 2 ML | Refills: 0 | Status: SHIPPED | OUTPATIENT
Start: 2025-04-29 | End: 2025-07-22

## 2025-05-27 ENCOUNTER — TELEPHONE (OUTPATIENT)
Age: 25
End: 2025-05-27

## 2025-05-27 ENCOUNTER — OFFICE VISIT (OUTPATIENT)
Age: 25
End: 2025-05-27
Payer: MEDICARE

## 2025-05-27 VITALS
TEMPERATURE: 97.2 F | HEIGHT: 64 IN | SYSTOLIC BLOOD PRESSURE: 120 MMHG | DIASTOLIC BLOOD PRESSURE: 70 MMHG | HEART RATE: 76 BPM | RESPIRATION RATE: 16 BRPM | BODY MASS INDEX: 42.82 KG/M2 | WEIGHT: 250.8 LBS

## 2025-05-27 DIAGNOSIS — R73.03 PREDIABETES: ICD-10-CM

## 2025-05-27 DIAGNOSIS — E66.813 OBESITY, CLASS III, BMI 40-49.9 (MORBID OBESITY): Primary | ICD-10-CM

## 2025-05-27 PROCEDURE — 99214 OFFICE O/P EST MOD 30 MIN: CPT | Performed by: PHYSICIAN ASSISTANT

## 2025-05-27 RX ORDER — TIRZEPATIDE 10 MG/.5ML
10 INJECTION, SOLUTION SUBCUTANEOUS WEEKLY
Qty: 2 ML | Refills: 2 | Status: SHIPPED | OUTPATIENT
Start: 2025-05-27 | End: 2025-08-19

## 2025-05-27 NOTE — TELEPHONE ENCOUNTER
PA for Zepbound SUBMITTED to [employer]     via    [x]CMM-KEY: [id number]  []Surescripts-Case ID #    []Availity-Auth ID #  NDC #    []Faxed to plan   []Other website    []Phone call Case ID #      []PA sent as URGENT    All office notes, labs and other pertaining documents and studies sent. Clinical questions answered. Awaiting determination from insurance company.     Turnaround time for your insurance to make a decision on your Prior Authorization can take 7-21 business days.

## 2025-05-27 NOTE — PROGRESS NOTES
Assessment/Plan:    1. Obesity, Class III, BMI 40-49.9 (morbid obesity)        2. Prediabetes                    Initial:  275lbs  Current: 250 lbs  Change: -11 lbs since last visit   Goal: 220 lbs; goal of 20% TBW short term goal      - Weight not at goal  - Patient is interested in Conservative Program  - Labs reviewed: As below.    - Discussed options of HealthyCORE-Intensive Lifestyle Intervention Program, Very Low Calorie Diet-VLCD, and Conservative Program and the role of weight loss medications.  - Explained the importance of continuing lifestyle changes while on an anti-obesity medication.  - Patient should demonstrate lifestyle changes first before anti-obesity medication initiated.   - Patient is interested in pursuing Conservative Program  -I have sent Zebound to your pharmacy. The prior authorization process will been done through our prior authorization team and can take up to 3-4 weeks to process through the insurance.     - Side effects of Zepbound include nausea, vomiting, diarrhea, or constipation. Keep an eye on your heart rate while on Zepbound. If you resting heart rate is greater than 100 beats per minutes, please notify me. If you develop severe abdominal pain, stop Zepbound and go to the emergency room, as that could be a sign of pancreatitis.     - Please notify me if you have surgery, upper endoscopy, or colonoscopy scheduled, as we typically hold Zepbound for one week prior to the procedure.     - Zepbound can reduce the effectiveness of oral hormonal birth control (birth control pills). Recommend a barrier backup method such as condoms to prevent pregnancy.     - Weight loss can improve patient's co-morbid conditions and/or prevent weight-related complications.  -- Labs reviewed: As below.      General Recommendations:  Nutrition:  Eat breakfast daily.  Do not skip meals.     Food log (ie.) www.bettercodes.org.com, sparkpeople.com, loseit.com, [employer]com, etc.    Practice mindful eating.   Be sure to set aside time to eat, eat slowly, and savor your food.    Hydration:    At least 64oz of water daily.  No sugar sweetened beverages.  No juice (eat the fruit instead).    Exercise:  Studies have shown that the ideal exercise goal is somewhere between 150 to 300 minutes of moderate intensity exercise a week.  Start with exercising 10 minutes every other day and gradually increase physical activity with a goal of at least 150 minutes of moderate intensity exercise a week, divided over at least 3 days a week.  An example of this would be exercising 30 minutes a day, 5 days a week.  Resistance training can increase muscle mass and increase our resting metabolic rate.   FULL BODY resistance training is recommended 2-3 times a week.  Do not do this on consecutive days to allow for muscle recovery.    Aim for a bare minimum 5000 steps, even on days you do not exercise.    Monitoring:   Weigh yourself daily.  If this causes undue stress, then just weigh yourself once a week.  Weigh yourself the same time of the day with the same amount of clothing on.  Preferably this should be done after waking up, before you eat, and with no clothing or minimal clothing on.    Specific Goals:  0579-1000 calories per day  Gradually increase physical activity to a goal of 5 days per week for 30 minutes of MODERATE intensity PLUS 2 days per week of FULL BODY resistance training  Goal protein intake of 60-80 grams per day    Calorie goal:  3490-0155 (Provided with meal plan to follow).        Return visit:  4 months   Continue Zepbound- increase today to 10mg  weekly     - Side effects of Zepbound include nausea, vomiting, diarrhea, or constipation. Keep an eye on your heart rate while on Zepbound. If you resting heart rate is greater than 100 beats per minutes, please notify me. If you develop severe abdominal pain, stop Zepbound and go to the emergency room, as that could be a sign of pancreatitis.     - Please notify me if you  have surgery, upper endoscopy, or colonoscopy scheduled, as we typically hold Zepbound for one week prior to the procedure.     - Zepbound can reduce the effectiveness of oral hormonal birth control (birth control pills). Recommend a barrier backup method such as condoms to prevent pregnancy.     Calorie tracking/deficit- encouraged tracking on [employer]itness[employer]. Discussed with patient the importance of not skipping breakfast and eating protein/fiber with each meal.  We will not go up on dose today as the patient has been skipping breakfast and sometimes dinner as well.   Physical activity goals- discussed increasing to 4-5days per week; 30 mins per session. Patient is starting at a gym this week.   AOM  Phentermine- no contraindications; EKG normal   Contrave- no contraindications   Topiramate- denies hx of renal stones  GLP-1- has IUD;      Subjective:   Chief Complaint   Patient presents with    Follow-up     MWM-2 mo F/u; Waist-40in       Patient ID: Gus Jurado  is a 24 y.o. female with excess weight/obesity here to pursue weight management.  Previous notes and records have been reviewed. [employer]   translated the office visit.     Patient has been taking Zepbound 7.5mg and has been doing well on this dose. No reported side effects. She still reports good appetite suppression, gets full on smaller portions.     Diet Recall:  Focusing on protein intake,   B- skipping *discussed protein ideas for breakfast, handouts given for nutrition recommendations   L- chicken, salad   Decreasing plantain  D- protein, salad, eggs     Physical Activity:  4x per week exercising   Dumbbells and treadmill 30 mins          Past Medical History:   Diagnosis Date    Chlamydia 2018     Past Surgical History:   Procedure Laterality Date     SECTION      3    [facility] N/A 3/29/2021    Procedure:  SECTION () REPEAT;  Surgeon: Patel Moreira MD;  Location: [facility];   Service: Obstetrics       HPI:  Wt Readings from Last 20 Encounters:   05/27/25 114 kg (250 lb 12.8 oz)   03/24/25 118 kg (261 lb 3.2 oz)   01/20/25 121 kg (266 lb 12.8 oz)   01/13/25 123 kg (271 lb)   11/18/24 125 kg (275 lb 12.8 oz)   10/30/24 124 kg (272 lb 9.6 oz)   08/21/24 122 kg (270 lb)   07/15/24 122 kg (269 lb)   05/17/24 127 kg (280 lb 6.4 oz)   03/05/24 106 kg (234 lb 3 oz)   10/06/23 118 kg (260 lb)   06/05/23 122 kg (270 lb)   12/11/22 118 kg (261 lb 3.9 oz)   09/26/22 112 kg (248 lb)   07/15/22 110 kg (242 lb 12.8 oz)   06/17/22 108 kg (237 lb 12.8 oz)   04/22/22 110 kg (242 lb)   09/21/21 114 kg (251 lb)   06/25/21 110 kg (243 lb)   04/21/21 108 kg (238 lb)     Obesity/Excess Weight:  Severity: Severe  Onset:  since last pregnancy 3 years ago    Modifiers: Diet and Exercise  Contributing factors: Pregnancy  Associated symptoms: comorbid conditions      In the interim, patient had an EKG it was within normal limits.   My diet and exercise has not changed.  Patient is working now and is moving .  No longer breastfeeding baby.          Sometimes feels hungry. Tends to eat out of boredom or when stressed. No cravings.       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    Family History   Problem Relation Name Age of Onset    No  "Known Problems Mother      No Known Problems Father      No Known Problems Sister      No Known Problems Brother      No Known Problems Daughter      No Known Problems Maternal Grandmother      No Known Problems Maternal Grandfather      Ovarian cancer Paternal Grandmother      Cancer Neg Hx      Diabetes Neg Hx          Review of Systems   HENT:  Negative for sore throat.    Respiratory:  Negative for cough and shortness of breath.    Cardiovascular:  Negative for chest pain and palpitations.   Gastrointestinal:  Negative for abdominal pain, constipation, diarrhea, nausea and vomiting.        Denies GERD   Endocrine: Negative for cold intolerance and heat intolerance.   Genitourinary:  Negative for dysuria.   Musculoskeletal:  Positive for back pain (intermittent). Negative for arthralgias.   Skin:  Negative for rash.   Neurological:  Positive for headaches (has migraines).   Psychiatric/Behavioral:  Negative for suicidal ideas (or HI).         Denies depression   + anxiety situational       Objective:  /70 (BP Location: Left arm, Patient Position: Sitting)   Pulse 76   Temp (!) 97.2 °F (36.2 °C) (Tympanic)   Resp 16   Ht 5' 3.5\" (1.613 m)   Wt 114 kg (250 lb 12.8 oz)   BMI 43.73 kg/m²     Physical Exam  Vitals and nursing note reviewed.      Constitutional   General appearance: Abnormal.  well developed and morbidly obese.   Eyes No conjunctival injection.   Ears, Nose, Mouth, and Throat Oral mucosa moist.   Pulmonary   Respiratory effort: No increased work of breathing or signs of respiratory distress.    Cardiovascular    Examination of extremities for edema and/or varicosities: Normal.  no edema.   Abdomen   Abdomen: Abnormal.  The abdomen was obese.    Musculoskeletal   Normal range of motion  Neurological   Gait and station: Normal.   Psychiatric   Orientation to person, place and time: Normal.    Affect: appropriate     "

## 2025-05-29 NOTE — TELEPHONE ENCOUNTER
PA for Zepbound  APPROVED     Date(s) approved 5/28/2025-11/28/2025    Case #    Patient advised by          []SeeMore Interactivehart Message  [x]Phone call   []LMOM  []L/M to call office as no active Communication consent on file  []Unable to leave detailed message as VM not approved on Communication consent       Pharmacy advised by    [x]Fax  []Phone call  []Secure Chat    Specialty Pharmacy    []     Approval letter scanned into Media Yes

## 2025-07-10 NOTE — PROGRESS NOTES
Name: Gus Jurado      : 2000      MRN: [id number]  Encounter Provider: SILVIA Duran  Encounter Date: 2025   Encounter department: [facility]  :  Assessment & Plan  Migraine with aura and without status migrainosus, not intractable    Continue Propranolol 80 mg daily. Imitrex, aleve, reglan prn.   Orders:    propranolol (INDERAL LA) 80 mg 24 hr capsule; Take 1 capsule (80 mg total) by mouth daily    Cervical radiculopathy    Orders:    XR spine cervical complete 4 or 5 vw non injury; Future    methocarbamol (ROBAXIN) 500 mg tablet; Take 1 tablet (500 mg total) by mouth 4 (four) times a day    Ambulatory Referral to Physical Therapy; Future    Vision problems  Requesting ophthalmology referral for a routine check.  Orders:    Ambulatory Referral to Ophthalmology; Future           History of Present Illness   Gus Jurado is a 24 y.o. female  has a past medical history of Chlamydia.  has a past surgical history that includes pr  delivery only (N/A, 3/29/2021) and  section.    Here today for follow-up for migraines. She reports that her migraines are well controlled. He is     She reports cervical pain for about a month. She works as a  at a warehouse. Pain feels like pressure & burning. She has tried aleve & tylenol, and topical otc topicals without relief. She does not recall any known injury/trauma. She also reports that feels tingling at times. This is the first time that she has felt this pain. Pillow is less than one year old.        Review of Systems   Constitutional:  Negative for chills and fever.   HENT:  Negative for ear pain and sore throat.    Eyes:  Negative for pain and visual disturbance.   Respiratory:  Negative for cough and shortness of breath.    Cardiovascular:  Negative for chest pain and palpitations.   Gastrointestinal:  Negative for abdominal pain and vomiting.   Genitourinary:  Negative for  "dysuria and hematuria.   Musculoskeletal:  Positive for back pain and neck pain. Negative for arthralgias.   Skin:  Negative for color change and rash.   Neurological:  Negative for seizures and syncope.   All other systems reviewed and are negative.      Objective   /62 (BP Location: Left arm, Patient Position: Sitting, Cuff Size: Standard)   Pulse 80   Temp (!) 97.1 °F (36.2 °C) (Temporal)   Resp 16   Ht 5' 3.5\" (1.613 m)   Wt 115 kg (254 lb 6.4 oz)   SpO2 97%   BMI 44.36 kg/m²      Physical Exam  Vitals and nursing note reviewed.   Constitutional:       General: She is not in acute distress.     Appearance: Normal appearance. She is well-developed.   HENT:      Head: Normocephalic and atraumatic.      Right Ear: External ear normal.      Left Ear: External ear normal.      Nose: Nose normal.     Eyes:      Conjunctiva/sclera: Conjunctivae normal.       Cardiovascular:      Rate and Rhythm: Normal rate.   Pulmonary:      Effort: Pulmonary effort is normal.     Musculoskeletal:         General: No swelling. Normal range of motion.      Cervical back: Normal range of motion and neck supple. Tenderness present. No swelling, edema, deformity, erythema, signs of trauma, lacerations, rigidity, spasms, torticollis, bony tenderness or crepitus. Pain with movement present. Normal range of motion.     Skin:     General: Skin is warm.     Neurological:      General: No focal deficit present.      Mental Status: She is alert and oriented to person, place, and time. Mental status is at baseline.     Psychiatric:         Mood and Affect: Mood normal.         Behavior: Behavior normal.         Thought Content: Thought content normal.         Judgment: Judgment normal.         "

## 2025-07-10 NOTE — ASSESSMENT & PLAN NOTE
{If prescribing CGRP gepants (Nurtec, Ubrelvy, Qulipta) or monoclonal antibodies (Emagality, Ajovy, Aimovig) that currently do not have a prior auth on file, click here to fill out prior auth smartform and then hit F2 with this smartlist to insert prior auth documentation (Optional):[id number]}  Continue Propranolol 80 mg daily. Imitrex, aleve, reglan prn.   Orders:    propranolol (INDERAL LA) 80 mg 24 hr capsule; Take 1 capsule (80 mg total) by mouth daily

## 2025-07-14 ENCOUNTER — OFFICE VISIT (OUTPATIENT)
Dept: FAMILY MEDICINE CLINIC | Facility: CLINIC | Age: 25
End: 2025-07-14

## 2025-07-14 VITALS
SYSTOLIC BLOOD PRESSURE: 130 MMHG | OXYGEN SATURATION: 97 % | DIASTOLIC BLOOD PRESSURE: 62 MMHG | HEART RATE: 80 BPM | HEIGHT: 64 IN | WEIGHT: 254.4 LBS | RESPIRATION RATE: 16 BRPM | TEMPERATURE: 97.1 F | BODY MASS INDEX: 43.43 KG/M2

## 2025-07-14 DIAGNOSIS — H54.7 VISION PROBLEMS: ICD-10-CM

## 2025-07-14 DIAGNOSIS — G43.109 MIGRAINE WITH AURA AND WITHOUT STATUS MIGRAINOSUS, NOT INTRACTABLE: Primary | ICD-10-CM

## 2025-07-14 DIAGNOSIS — M54.12 CERVICAL RADICULOPATHY: ICD-10-CM

## 2025-07-14 PROCEDURE — 99214 OFFICE O/P EST MOD 30 MIN: CPT

## 2025-07-14 RX ORDER — METHOCARBAMOL 500 MG/1
500 TABLET, FILM COATED ORAL 4 TIMES DAILY
Qty: 90 TABLET | Refills: 0 | Status: SHIPPED | OUTPATIENT
Start: 2025-07-14

## 2025-07-14 RX ORDER — PROPRANOLOL HYDROCHLORIDE 80 MG/1
80 CAPSULE, EXTENDED RELEASE ORAL DAILY
Qty: 90 CAPSULE | Refills: 2 | Status: SHIPPED | OUTPATIENT
Start: 2025-07-14

## 2025-07-14 NOTE — ASSESSMENT & PLAN NOTE
Orders:    XR spine cervical complete 4 or 5 vw non injury; Future    methocarbamol (ROBAXIN) 500 mg tablet; Take 1 tablet (500 mg total) by mouth 4 (four) times a day    Ambulatory Referral to Physical Therapy; Future

## 2025-08-03 DIAGNOSIS — M54.12 CERVICAL RADICULOPATHY: ICD-10-CM

## 2025-08-04 RX ORDER — METHOCARBAMOL 500 MG/1
500 TABLET, FILM COATED ORAL 4 TIMES DAILY
Qty: 90 TABLET | Refills: 0 | Status: SHIPPED | OUTPATIENT
Start: 2025-08-04

## 2025-08-18 ENCOUNTER — HOSPITAL ENCOUNTER (OUTPATIENT)
Dept: RADIOLOGY | Facility: HOSPITAL | Age: 25
Discharge: HOME | End: 2025-08-18
Payer: MEDICARE

## 2025-08-18 DIAGNOSIS — M54.12 CERVICAL RADICULOPATHY: ICD-10-CM

## 2025-08-18 PROCEDURE — 72050 X-RAY EXAM NECK SPINE 4/5VWS: CPT

## (undated) DEVICE — PACK C-SECTION PBDS

## (undated) DEVICE — ABG MICROSTICKS SAFETY

## (undated) DEVICE — CHLORAPREP HI-LITE 26ML ORANGE

## (undated) DEVICE — SUT PLAIN 3-0 CT-1 27 IN 842H

## (undated) DEVICE — GLOVE INDICATOR PI UNDERGLOVE SZ 7.5 BLUE

## (undated) DEVICE — GLOVE SRG BIOGEL ECLIPSE 7

## (undated) DEVICE — ADHESIVE SKIN HIGH VISCOSITY EXOFIN 1ML

## (undated) DEVICE — SUT VICRYL 0 CT 36 IN J958H

## (undated) DEVICE — SUT MONOCRYL 4-0 PS-2 27 IN Y426H

## (undated) DEVICE — SCD SEQUENTIAL COMPRESSION COMFORT SLEEVE MEDIUM KNEE LENGTH: Brand: KENDALL SCD